# Patient Record
Sex: FEMALE | Race: WHITE | NOT HISPANIC OR LATINO | Employment: OTHER | URBAN - METROPOLITAN AREA
[De-identification: names, ages, dates, MRNs, and addresses within clinical notes are randomized per-mention and may not be internally consistent; named-entity substitution may affect disease eponyms.]

---

## 2017-05-15 DIAGNOSIS — Z78.0 MENOPAUSE: ICD-10-CM

## 2017-05-15 RX ORDER — PROGESTERONE 100 MG/1
CAPSULE ORAL
Qty: 30 CAPSULE | Refills: 0 | Status: SHIPPED | OUTPATIENT
Start: 2017-05-15 | End: 2017-07-20 | Stop reason: SDUPTHER

## 2017-05-17 ENCOUNTER — NURSE TRIAGE (OUTPATIENT)
Dept: ADMINISTRATIVE | Facility: CLINIC | Age: 57
End: 2017-05-17

## 2017-05-18 ENCOUNTER — TELEPHONE (OUTPATIENT)
Dept: OBSTETRICS AND GYNECOLOGY | Facility: CLINIC | Age: 57
End: 2017-05-18

## 2017-05-18 ENCOUNTER — OFFICE VISIT (OUTPATIENT)
Dept: OBSTETRICS AND GYNECOLOGY | Facility: CLINIC | Age: 57
End: 2017-05-18
Payer: COMMERCIAL

## 2017-05-18 VITALS
SYSTOLIC BLOOD PRESSURE: 150 MMHG | DIASTOLIC BLOOD PRESSURE: 80 MMHG | WEIGHT: 185.5 LBS | HEIGHT: 67 IN | BODY MASS INDEX: 29.11 KG/M2

## 2017-05-18 DIAGNOSIS — R30.0 DYSURIA: Primary | ICD-10-CM

## 2017-05-18 DIAGNOSIS — R03.0 ELEVATED BP WITHOUT DIAGNOSIS OF HYPERTENSION: ICD-10-CM

## 2017-05-18 DIAGNOSIS — I10 HTN (HYPERTENSION), BENIGN: ICD-10-CM

## 2017-05-18 DIAGNOSIS — N39.0 URINARY TRACT INFECTION WITHOUT HEMATURIA, SITE UNSPECIFIED: Primary | ICD-10-CM

## 2017-05-18 DIAGNOSIS — R82.90 ABNORMAL URINALYSIS: ICD-10-CM

## 2017-05-18 LAB
BILIRUB SERPL-MCNC: ABNORMAL MG/DL
BLOOD URINE, POC: 250
COLOR, POC UA: YELLOW
GLUCOSE UR QL STRIP: ABNORMAL
KETONES UR QL STRIP: ABNORMAL
LEUKOCYTE ESTERASE URINE, POC: ABNORMAL
NITRITE, POC UA: ABNORMAL
PH, POC UA: 5
PROTEIN, POC: ABNORMAL
SPECIFIC GRAVITY, POC UA: 1.02
UROBILINOGEN, POC UA: ABNORMAL

## 2017-05-18 PROCEDURE — 81002 URINALYSIS NONAUTO W/O SCOPE: CPT | Mod: S$GLB,,, | Performed by: NURSE PRACTITIONER

## 2017-05-18 PROCEDURE — 99214 OFFICE O/P EST MOD 30 MIN: CPT | Mod: 25,S$GLB,, | Performed by: NURSE PRACTITIONER

## 2017-05-18 PROCEDURE — 99999 PR PBB SHADOW E&M-EST. PATIENT-LVL III: CPT | Mod: PBBFAC,,, | Performed by: NURSE PRACTITIONER

## 2017-05-18 PROCEDURE — 3079F DIAST BP 80-89 MM HG: CPT | Mod: S$GLB,,, | Performed by: NURSE PRACTITIONER

## 2017-05-18 PROCEDURE — 1160F RVW MEDS BY RX/DR IN RCRD: CPT | Mod: S$GLB,,, | Performed by: NURSE PRACTITIONER

## 2017-05-18 PROCEDURE — 87086 URINE CULTURE/COLONY COUNT: CPT

## 2017-05-18 PROCEDURE — 3077F SYST BP >= 140 MM HG: CPT | Mod: S$GLB,,, | Performed by: NURSE PRACTITIONER

## 2017-05-18 RX ORDER — NITROFURANTOIN 25; 75 MG/1; MG/1
100 CAPSULE ORAL 2 TIMES DAILY
Qty: 14 CAPSULE | Refills: 0 | Status: SHIPPED | OUTPATIENT
Start: 2017-05-18 | End: 2017-05-25

## 2017-05-18 NOTE — TELEPHONE ENCOUNTER
Reason for Disposition   Urinating more frequently than usual (i.e., frequency)    Protocols used: ST URINARY SYMPTOMS-A-AH  pt has dysuria, hematuria, and urinary frequency. Pt was wanting an appt with GYN and possible prescription. Pt advised to call GYN in the morning. Offered to schedule appt with PCP but patient refused

## 2017-05-18 NOTE — TELEPHONE ENCOUNTER
Patient was seem by Dr Montesinos today - OB-GYN provider. Notified Mukesh hernandez to contact patient

## 2017-05-18 NOTE — PROGRESS NOTES
"Chief Complaint: Dysuria     HPI  Pt reports having burning with urination for the last 1-2  Days. The burning is more right after urination with bladder discomfort. Nothing makes it better/worse. She reports urinary frequency, urgency, w/o  flank pain at this time, + for hematuria yesterday. She does not believe it is from the vagina. She had PMB in fall of 2016 and had medication adjustments and hasn't had FU since that time. Reports the bleeding has resolved. She denies fever, chills, n/v/d. She denies vaginal lesions, soaps, detergents, or douching. No known h/o recurrent UTIs.     Elevated BP--she reports readings have been higher and has discussed with PCP. She reports having left sided discomfort on her arm when her BP is elevated, has been evaluated in ED and nothing acute at that time.    BP (!) 150/80  Ht 5' 7" (1.702 m)  Wt 84.1 kg (185 lb 8.3 oz)  LMP 07/26/2016  BMI 29.06 kg/m2    ROS   Systemic: No fever.  Gastrointestinal: No nausea and no suprapubic pain.  No diarrhea and no constipation.  Genitourinary: No urinary loss of control.  Positive for Dysuria.  No flank pain.  Skin: No rash.    Physical Exam   Vital Signs: ° Normal.  General Appearance: ° well developed.  ° Well nourished.  Neck: °Symmetrical °Trachea appearance mid-line  Eyes: °Extra-ocular movements normal   Respiratory: °No respiratory distress noted, °no use of accessory muscles  Back: ° No costovertebral angle tenderness.  Abdomen: Palpation: °  Positive direct suprapubic tenderness.  Genitalia:External: ° Vulva was normal.  ° Genitalia exhibited no lesion.  Vagina: ° Mucosa was normal.  ° No vaginal discharge was observed--Pelvic:Cervix: ° No cervical discharge.  ° Showed no lesion.  ° Not tender, no CMT  Uterus: ° Position was normal.   ° Not tender.  Psychiatric: Affect: ° Normal.  Neurological: ° No disorientation   Skin: °No skin rashes noted      Assessment/Plan   1. UTI--Macrobid as ordered. UA in office positive. Urine " Culture ordered at this visit. Prevention measures discussed. Drink plenty fluids. Discussed with pt what s/s to report back to the clinic.    2. Elevated Bp-continue home monitoring. Discussed with pt need for FU PCP apt to review symptoms and medication management. Precautions discussed and referred to Dr. Izaguirre    3. PMB--this is resolved after medication adjustments. However hasn't had fu. Recommend fu in 2 wks and also for repeat urine at that time since pt with Hematuria.

## 2017-05-18 NOTE — MR AVS SNAPSHOT
[facility]  [address], [address]  [facility] [address]  Phone: [phone]  Fax: [phone]                  Odilia Winslow   2017 1:40 PM   Office Visit    Description:  Female : 1960   Provider:  Ila Montesinos NP   Department:  [facility]           Reason for Visit     Urinary Frequency           Diagnoses this Visit        Comments    Dysuria    -  Primary     Abnormal urinalysis         Elevated BP without diagnosis of hypertension         HTN (hypertension), benign                To Do List           Future Appointments        Provider Department Dept Phone    2017 1:45 PM Odilia Perez MD [facility] [phone]      Goals (5 Years of Data)     None      Follow-Up and Disposition     Return in about 2 weeks (around 2017).    Follow-up and Disposition History      [facility]     [facility] On Call Nurse Care Line -  Assistance  Unless otherwise directed by your provider, please contact [facility], our nurse care line that is available for  assistance.     Registered nurses in the [facility] On Call Center provide: appointment scheduling, clinical advisement, health education, and other advisory services.  Call: [phone] (toll free)               Medications           Message regarding Medications     Verify the changes and/or additions to your medication regime listed below are the same as discussed with your clinician today.  If any of these changes or additions are incorrect, please notify your healthcare provider.             Verify that the below list of medications is an accurate representation of the medications you are currently taking.  If none reported, the list may be blank. If incorrect, please contact your healthcare provider. Carry this list with you in case of emergency.           Current Medications     amlodipine (NORVASC) 5 MG tablet Take 1 tablet (5 mg total) by mouth once daily.     "atorvastatin (LIPITOR) 20 MG tablet Take 1 tablet (20 mg total) by mouth once daily.    DEXILANT 60 mg capsule     escitalopram oxalate (LEXAPRO) 20 MG tablet Take 1 tablet (20 mg total) by mouth once daily.    progesterone (PROMETRIUM) 100 MG capsule TAKE ONE DAILY    triamcinolone acetonide 0.1% (KENALOG) 0.1 % cream APPLY TOPICALLY TWICE DAILY    valacyclovir (VALTREX) 1000 MG tablet TAKE (1/2) HALF BY MOUTH EVERY 12 HOURS    valsartan (DIOVAN) 160 MG tablet Take 1 tablet (160 mg total) by mouth every morning.           Clinical Reference Information           Your Vitals Were     BP Height Weight Last Period BMI    150/80 5' 7" (1.702 m) 84.1 kg (185 lb 8.3 oz) 07/26/2016 29.06 kg/m2      Blood Pressure          Most Recent Value    BP  (!)  150/80      Allergies as of 5/18/2017     No Known Allergies      Immunizations Administered on Date of Encounter - 5/18/2017     None      Orders Placed During Today's Visit      Normal Orders This Visit    POCT URINE DIPSTICK WITHOUT MICROSCOPE     Urine culture       Instructions    Care and prevention  These self-care steps can help prevent future infections:  · Drink plenty of fluids (at least 8-10 glasses of water) to prevent dehydration and flush out of the bladder. Do this unless you must restrict fluids for other health reasons, or your doctor told you not to.  · Drink cranberry juice every day. Because cranberry juice lowers the pH of the urinary tract, it discourages bacterial growth.   · Proper cleaning after going to the bathroom is important. Wipe from front to back after using the toilet to prevent the spread of bacteria.  · Urinate more often. Don't try to hold urine in for a long time.  · Wear loose-fitting clothes and cotton underwear. Avoid tight-fitting pans.  · Improve your diet and prevent constipation. Eat more fresh fruit and vegetables, and fiber, and less junk and fatty foods.  · Avoid sex until your symptoms are gone.  · Avoid caffeine, sodas, " alcohol, and spicy foods. These can irritate the bladder.  · Empty your bladder immediately before and after sexual intercourse to flush out the bladder.   · Take your entire prescription of antibiotics and follow the preventive measures, even if your symptoms stop.          Language Assistance Services     ATTENTION: Language assistance services are available, free of charge. Please call [phone].      ATENCIÓN: Si habla español, tiene a doyle disposición servicios gratuitos de asistencia lingüística. Llame al [phone].     KERWIN Ý: N?u b?n nói Ti?ng Vi?t, có các d?ch v? h? tr? ngôn ng? mi?n phí dành cho b?n. G?i s? [phone].         [facility] complies with applicable Federal civil rights laws and does not discriminate on the basis of race, color, national origin, age, disability, or sex.

## 2017-05-18 NOTE — TELEPHONE ENCOUNTER
Pt called, Ila was supposed to send an antibiotic to [facility] but it was never sent in. [facility] contacted Dr Colon by mistake requesting the medication. Please send script before pharmacy closes today.

## 2017-05-18 NOTE — TELEPHONE ENCOUNTER
----- Message from Mukesh Stevens sent at 5/18/2017  3:51 PM CDT -----  Contact: [facility]  x_ 1st Request   _ 2nd Request   _ 3rd Request     Who: JUDY RAMÍREZ [[id number]]    Why: Pharmacy called stated the patient was waiting for you to send in a prescription for antibiotic.  Patient is waiting.    What Number to Call Back: [phone]    When to Expect a call back: (Before the end of the day)   -- if call after 3:00 call back will be tomorrow.

## 2017-05-20 LAB — BACTERIA UR CULT: NO GROWTH

## 2017-05-23 ENCOUNTER — TELEPHONE (OUTPATIENT)
Dept: OBSTETRICS AND GYNECOLOGY | Facility: CLINIC | Age: 57
End: 2017-05-23

## 2017-05-23 NOTE — TELEPHONE ENCOUNTER
Spoke with pt and informed her that her urine culture came back negative. Pt states that the antibiotics are helping and she feels much better. Pt has not seen any blood in her urine and is not complaining  of discomfort. Pt is scheduled for a appointment with Dr. Perez on 6/13 and she will do her follow up U/A then. ELANA

## 2017-05-23 NOTE — TELEPHONE ENCOUNTER
Left message, urine culture is negative. How is patient feeling. Please let me know when patient calls so i can talk with her

## 2017-06-02 RX ORDER — VALSARTAN 160 MG/1
TABLET ORAL
Qty: 90 TABLET | Refills: 0 | Status: SHIPPED | OUTPATIENT
Start: 2017-06-02 | End: 2017-08-24 | Stop reason: SDUPTHER

## 2017-06-13 ENCOUNTER — OFFICE VISIT (OUTPATIENT)
Dept: OBSTETRICS AND GYNECOLOGY | Facility: CLINIC | Age: 57
End: 2017-06-13
Attending: OBSTETRICS & GYNECOLOGY
Payer: COMMERCIAL

## 2017-06-13 VITALS
BODY MASS INDEX: 29.02 KG/M2 | WEIGHT: 184.88 LBS | HEIGHT: 67 IN | SYSTOLIC BLOOD PRESSURE: 138 MMHG | DIASTOLIC BLOOD PRESSURE: 80 MMHG

## 2017-06-13 DIAGNOSIS — R93.89 THICKENED ENDOMETRIUM: ICD-10-CM

## 2017-06-13 DIAGNOSIS — R31.9 HEMATURIA: ICD-10-CM

## 2017-06-13 DIAGNOSIS — N95.0 POSTMENOPAUSAL BLEEDING: Primary | ICD-10-CM

## 2017-06-13 LAB
BILIRUB SERPL-MCNC: ABNORMAL MG/DL
BLOOD URINE, POC: ABNORMAL
COLOR, POC UA: YELLOW
GLUCOSE UR QL STRIP: NORMAL
KETONES UR QL STRIP: ABNORMAL
LEUKOCYTE ESTERASE URINE, POC: 1
NITRITE, POC UA: ABNORMAL
PH, POC UA: 5
PROTEIN, POC: ABNORMAL
SPECIFIC GRAVITY, POC UA: 1.01
UROBILINOGEN, POC UA: NORMAL

## 2017-06-13 PROCEDURE — 87186 SC STD MICRODIL/AGAR DIL: CPT

## 2017-06-13 PROCEDURE — 99999 PR PBB SHADOW E&M-EST. PATIENT-LVL III: CPT | Mod: PBBFAC,,, | Performed by: OBSTETRICS & GYNECOLOGY

## 2017-06-13 PROCEDURE — 87077 CULTURE AEROBIC IDENTIFY: CPT

## 2017-06-13 PROCEDURE — 87086 URINE CULTURE/COLONY COUNT: CPT

## 2017-06-13 PROCEDURE — 81002 URINALYSIS NONAUTO W/O SCOPE: CPT | Mod: S$GLB,,, | Performed by: OBSTETRICS & GYNECOLOGY

## 2017-06-13 PROCEDURE — 87088 URINE BACTERIA CULTURE: CPT

## 2017-06-13 PROCEDURE — 99213 OFFICE O/P EST LOW 20 MIN: CPT | Mod: 25,S$GLB,, | Performed by: OBSTETRICS & GYNECOLOGY

## 2017-06-13 RX ORDER — TRAZODONE HYDROCHLORIDE 50 MG/1
50 TABLET ORAL NIGHTLY
Qty: 30 TABLET | Refills: 2 | Status: ON HOLD | OUTPATIENT
Start: 2017-06-13 | End: 2017-06-20 | Stop reason: HOSPADM

## 2017-06-13 NOTE — PROGRESS NOTES
Subjective:       Patient ID: Odilia Winslow is a 57 y.o. female.    Chief Complaint:  Follow-up (2 wk follow up with pmb)      History of Present Illness  - patient presents to f/u postmenopausal bleeding. She reports that since stopping estrogen (and continuing progesterone) she has had no further bleeding. Is also here to give a urine sample for UTI diagnosed at last visit; denies dysuria. C/o trouble sleeping since stopping estrogen. Patient has tried otc remedies including melatonin and benadryl. Did not like how Ambien made her feel in the past. Patient had negative EMB and normal u/s in July 2016.    Past Medical History:   Diagnosis Date    Anxiety     GERD (gastroesophageal reflux disease)     Hyperlipidemia     Hypertension     Irritable bowel syndrome        Past Surgical History:   Procedure Laterality Date    DILATION AND CURETTAGE OF UTERUS  2007, 2013    ECTOPIC PREGNANCY SURGERY  1986    removal of portion of tube    KNEE SURGERY Right 2015    arthroscopy    TONSILLECTOMY           Current Outpatient Prescriptions:     amlodipine (NORVASC) 5 MG tablet, Take 1 tablet (5 mg total) by mouth once daily., Disp: 90 tablet, Rfl: 3    atorvastatin (LIPITOR) 20 MG tablet, Take 1 tablet (20 mg total) by mouth once daily., Disp: 90 tablet, Rfl: 3    DEXILANT 60 mg capsule, , Disp: , Rfl:     escitalopram oxalate (LEXAPRO) 20 MG tablet, Take 1 tablet (20 mg total) by mouth once daily., Disp: 90 tablet, Rfl: 3    progesterone (PROMETRIUM) 100 MG capsule, TAKE ONE DAILY, Disp: 30 capsule, Rfl: 0    trazodone (DESYREL) 50 MG tablet, Take 1 tablet (50 mg total) by mouth nightly., Disp: 30 tablet, Rfl: 2    valacyclovir (VALTREX) 1000 MG tablet, TAKE (1/2) HALF BY MOUTH EVERY 12 HOURS, Disp: 30 tablet, Rfl: 3    valsartan (DIOVAN) 160 MG tablet, TAKE ONE BY MOUTH EVERY MORNING, Disp: 90 tablet, Rfl: 0    Review of patient's allergies indicates:  No Known Allergies    GYN & OB History  Patient's  "last menstrual period was 2016.   Date of Last Pap: 2015    OB History    Para Term  AB Living   4 3 2  1 3   SAB TAB Ectopic Multiple Live Births     1  3      # Outcome Date GA Lbr Carlos/2nd Weight Sex Delivery Anes PTL Lv   4 Ectopic            3 Para      Vag-Spont  N RACHEL   2 Term      Vag-Spont  N RACHEL   1 Term      Vag-Spont  N RACHEL      Obstetric Comments   Menopause age 46       Social History     Social History    Marital status:      Spouse name: N/A    Number of children: N/A    Years of education: N/A     Occupational History    Not on file.     Social History Main Topics    Smoking status: Never Smoker    Smokeless tobacco: Never Used    Alcohol use Yes      Comment: occasional    Drug use: No    Sexual activity: Yes     Partners: Male     Birth control/ protection: Post-menopausal     Other Topics Concern    Not on file     Social History Narrative    No narrative on file       Family History   Problem Relation Age of Onset    Heart disease Mother     Hypertension Mother     Diabetes Father     Breast cancer Maternal Aunt     Colon cancer Neg Hx     Ovarian cancer Neg Hx        Review of Systems  Review of Systems   - see HPI    Objective:     Vitals:    17 1359   BP: 138/80   Weight: 83.8 kg (184 lb 13.7 oz)   Height: 5' 7" (1.702 m)       Physical Exam:   Constitutional: She appears well-developed and well-nourished. She is cooperative. No distress.       Cardiovascular: Normal rate, regular rhythm and normal heart sounds.     Pulmonary/Chest: Effort normal and breath sounds normal.        Abdominal: Soft. Normal appearance. There is no tenderness.     Genitourinary: Vagina normal and uterus normal. There is no rash, tenderness or lesion on the right labia. There is no rash, tenderness or lesion on the left labia. Cervix is normal. Right adnexum displays no mass, no tenderness and no fullness. Left adnexum displays no mass, no tenderness and no " fullness.               Neurological: She is alert.          Assessment/ Plan:     Orders Placed This Encounter    Urine culture    US Pelvis Comp with Transvag NON-OB (xpd    POCT URINE DIPSTICK WITHOUT MICROSCOPE    trazodone (DESYREL) 50 MG tablet       Odilia was seen today for follow-up.    Diagnoses and all orders for this visit:    Postmenopausal bleeding  -     US Pelvis Comp with Transvag NON-OB (xpd; Future  -     Urine culture    Hematuria  -     POCT URINE DIPSTICK WITHOUT MICROSCOPE  -     Urine culture    Thickened endometrium    Other orders  -     trazodone (DESYREL) 50 MG tablet; Take 1 tablet (50 mg total) by mouth nightly.    - urine shows trace blood; will send for culture.  - reviewed u/s: thickened EMS. D/w patient: since had EMB last year that was benign yet endometrium is now thickened, recommend hysteroscopy D&C for definitive diagnosis and likely treatment. Discussed risks/benefits. Patient agrees with scheduling procedure. Sent case request to office staff. Signed consents.    Return if symptoms worsen or fail to improve, for procedure when scheduled.

## 2017-06-15 ENCOUNTER — TELEPHONE (OUTPATIENT)
Dept: OBSTETRICS AND GYNECOLOGY | Facility: CLINIC | Age: 57
End: 2017-06-15

## 2017-06-16 ENCOUNTER — TELEPHONE (OUTPATIENT)
Dept: OBSTETRICS AND GYNECOLOGY | Facility: CLINIC | Age: 57
End: 2017-06-16

## 2017-06-16 DIAGNOSIS — R93.89 THICKENED ENDOMETRIUM: Primary | ICD-10-CM

## 2017-06-16 DIAGNOSIS — Z78.0 MENOPAUSE: ICD-10-CM

## 2017-06-16 LAB — BACTERIA UR CULT: NORMAL

## 2017-06-16 RX ORDER — NITROFURANTOIN 25; 75 MG/1; MG/1
100 CAPSULE ORAL 2 TIMES DAILY
Qty: 10 CAPSULE | Refills: 0 | Status: SHIPPED | OUTPATIENT
Start: 2017-06-16 | End: 2017-06-23

## 2017-06-16 NOTE — TELEPHONE ENCOUNTER
----- Message from Odilia Perez MD sent at 6/16/2017 11:34 AM CDT -----  Call patient. Has UTI. Needs to start treatment today since having procedure Tuesday. Send in Rx for macrobid 100 mg #10  i po bid no refills.

## 2017-06-17 DIAGNOSIS — R93.89 THICKENED ENDOMETRIUM: ICD-10-CM

## 2017-06-17 RX ORDER — IBUPROFEN 200 MG
800 TABLET ORAL EVERY 8 HOURS
Status: CANCELLED | OUTPATIENT
Start: 2017-06-18

## 2017-06-17 RX ORDER — ONDANSETRON 4 MG/1
8 TABLET, ORALLY DISINTEGRATING ORAL EVERY 8 HOURS PRN
Status: CANCELLED | OUTPATIENT
Start: 2017-06-17

## 2017-06-17 RX ORDER — KETOROLAC TROMETHAMINE 30 MG/ML
30 INJECTION, SOLUTION INTRAMUSCULAR; INTRAVENOUS EVERY 6 HOURS
Status: CANCELLED | OUTPATIENT
Start: 2017-06-17 | End: 2017-06-18

## 2017-06-17 NOTE — H&P
[facility] OB/[facility]N  Obstetrics & Gynecology  History & Physical    Patient Name: Odilia Winslow  MRN: [id number]  Admission Date: (Not on file)  Primary Care Provider: ALIRIO Clark MD    Subjective:     Chief Complaint/Reason for Admission: thickened endometrium    History of Present Illness: patient presented to the office to f/u postmenopausal bleeding. She reports that since stopping estrogen (and continuing progesterone) she has had no further bleeding. Is also being treated for a UTI with macrobid.  Patient had negative EMB and normal u/s in 2016. A decision for made for hysteroscopy D&C since endometrium is more thickened than it was in 2016; since had negative EMB in 2016, need definitive diagnosis with hysteroscopy D&C.     Current Outpatient Prescriptions on File Prior to Visit   Medication Sig    amlodipine (NORVASC) 5 MG tablet Take 1 tablet (5 mg total) by mouth once daily.    atorvastatin (LIPITOR) 20 MG tablet Take 1 tablet (20 mg total) by mouth once daily.    DEXILANT 60 mg capsule     escitalopram oxalate (LEXAPRO) 20 MG tablet Take 1 tablet (20 mg total) by mouth once daily.    nitrofurantoin, macrocrystal-monohydrate, (MACROBID) 100 MG capsule Take 1 capsule (100 mg total) by mouth 2 (two) times daily.    progesterone (PROMETRIUM) 100 MG capsule TAKE ONE DAILY    trazodone (DESYREL) 50 MG tablet Take 1 tablet (50 mg total) by mouth nightly.    valacyclovir (VALTREX) 1000 MG tablet TAKE (1/2) HALF BY MOUTH EVERY 12 HOURS    valsartan (DIOVAN) 160 MG tablet TAKE ONE BY MOUTH EVERY MORNING     No current facility-administered medications on file prior to visit.        Review of patient's allergies indicates:  No Known Allergies    Past Medical History:   Diagnosis Date    Anxiety     GERD (gastroesophageal reflux disease)     Hyperlipidemia     Hypertension     Irritable bowel syndrome      OB History    Para Term  AB Living   4 3 2  1 3   SAB TAB Ectopic  Multiple Live Births     1  3      # Outcome Date GA Lbr Carlos/2nd Weight Sex Delivery Anes PTL Lv   4 Ectopic            3 Para      Vag-Spont  N RACHEL   2 Term      Vag-Spont  N RACHEL   1 Term      Vag-Spont  N RACHEL      Obstetric Comments   Menopause age 46     Past Surgical History:   Procedure Laterality Date    DILATION AND CURETTAGE OF UTERUS  2007, 2013    ECTOPIC PREGNANCY SURGERY  1986    removal of portion of tube    KNEE SURGERY Right 2015    arthroscopy    TONSILLECTOMY       Family History     Problem Relation (Age of Onset)    Breast cancer Maternal Aunt    Diabetes Father    Heart disease Mother    Hypertension Mother        Social History Main Topics    Smoking status: Never Smoker    Smokeless tobacco: Never Used    Alcohol use Yes      Comment: occasional    Drug use: No    Sexual activity: Yes     Partners: Male     Birth control/ protection: Post-menopausal     Review of Systems    - General ROS: negative  Respiratory ROS: no cough, shortness of breath, or wheezing  Cardiovascular ROS: no chest pain or dyspnea on exertion  Gastrointestinal ROS: no abdominal pain, change in bowel habits, or black or bloody stools  Genito-Urinary ROS: no dysuria, trouble voiding, or hematuria  Neurological ROS: no TIA or stroke symptoms    Objective:      There is no height or weight on file to calculate BMI.  Patient's last menstrual period was 07/26/2016.    Physical Exam  Gen: AAO x 3, NAD  VS: see admit vitals  Heart: RRR  Lungs: CTA bilaterally  Abdomen: soft, NT/ND  Pelvic: deferred  Ext: no CCE    Laboratory:  None    Diagnostic Results:  US: Reviewed  Uterus:         Size: 7 x 3.7 x 4.2 cm         Appearance: Normal       Endometrial stripe: 10 mm    Right ovary: Not able to be visualized.    Left ovary: Not able to be visualized.    Assessment/Plan:     Thickened endometrium, UTI    - hysteroscopy D&C on 6/20/17  - risks/benefits discussed in detail, consents signed    Odilia Perez  MD  Obstetrics & Gynecology  [facility]

## 2017-06-18 RX ORDER — PROGESTERONE 100 MG/1
CAPSULE ORAL
Refills: 0 | OUTPATIENT
Start: 2017-06-18

## 2017-06-19 ENCOUNTER — HOSPITAL ENCOUNTER (OUTPATIENT)
Dept: PREADMISSION TESTING | Facility: OTHER | Age: 57
Discharge: HOME | End: 2017-06-19
Attending: OBSTETRICS & GYNECOLOGY
Payer: COMMERCIAL

## 2017-06-19 ENCOUNTER — ANESTHESIA EVENT (OUTPATIENT)
Dept: SURGERY | Facility: OTHER | Age: 57
End: 2017-06-19
Payer: COMMERCIAL

## 2017-06-19 VITALS
SYSTOLIC BLOOD PRESSURE: 141 MMHG | HEIGHT: 67 IN | DIASTOLIC BLOOD PRESSURE: 74 MMHG | WEIGHT: 180 LBS | TEMPERATURE: 99 F | BODY MASS INDEX: 28.25 KG/M2 | OXYGEN SATURATION: 98 % | HEART RATE: 84 BPM

## 2017-06-19 LAB
BASOPHILS # BLD AUTO: 0.03 K/UL
BASOPHILS NFR BLD: 0.5 %
DIFFERENTIAL METHOD: ABNORMAL
EOSINOPHIL # BLD AUTO: 0.2 K/UL
EOSINOPHIL NFR BLD: 3.7 %
ERYTHROCYTE [DISTWIDTH] IN BLOOD BY AUTOMATED COUNT: 13.2 %
HCT VFR BLD AUTO: 42.7 %
HGB BLD-MCNC: 14.1 G/DL
LYMPHOCYTES # BLD AUTO: 1.6 K/UL
LYMPHOCYTES NFR BLD: 24.5 %
MCH RBC QN AUTO: 31.1 PG
MCHC RBC AUTO-ENTMCNC: 33 %
MCV RBC AUTO: 94 FL
MONOCYTES # BLD AUTO: 0.6 K/UL
MONOCYTES NFR BLD: 8.9 %
NEUTROPHILS # BLD AUTO: 4.1 K/UL
NEUTROPHILS NFR BLD: 62.1 %
PLATELET # BLD AUTO: 288 K/UL
PMV BLD AUTO: 9.5 FL
RBC # BLD AUTO: 4.54 M/UL
WBC # BLD AUTO: 6.53 K/UL

## 2017-06-19 PROCEDURE — 36415 COLL VENOUS BLD VENIPUNCTURE: CPT

## 2017-06-19 PROCEDURE — 85025 COMPLETE CBC W/AUTO DIFF WBC: CPT

## 2017-06-19 RX ORDER — MIDAZOLAM HYDROCHLORIDE 5 MG/ML
5 INJECTION INTRAMUSCULAR; INTRAVENOUS
Status: DISCONTINUED | OUTPATIENT
Start: 2017-06-20 | End: 2017-06-20 | Stop reason: HOSPADM

## 2017-06-19 RX ORDER — SODIUM CHLORIDE, SODIUM LACTATE, POTASSIUM CHLORIDE, CALCIUM CHLORIDE 600; 310; 30; 20 MG/100ML; MG/100ML; MG/100ML; MG/100ML
INJECTION, SOLUTION INTRAVENOUS CONTINUOUS
Status: CANCELLED | OUTPATIENT
Start: 2017-06-19

## 2017-06-19 NOTE — ANESTHESIA PREPROCEDURE EVALUATION
06/19/2017  Odilia Winslow is a 57 y.o., female.    Anesthesia Evaluation    I have reviewed the Patient Summary Reports.    I have reviewed the Nursing Notes.   I have reviewed the Medications.     Review of Systems  Anesthesia Hx:  No problems with previous Anesthesia  Denies Family Hx of Anesthesia complications.   Denies Personal Hx of Anesthesia complications.   Social:  Non-Smoker    Hematology/Oncology:  Hematology Normal   Oncology Normal     Cardiovascular:   Hypertension, well controlled    Pulmonary:  Pulmonary Normal    Renal/:  Renal/ Normal     Hepatic/GI:   GERD, well controlled    Musculoskeletal:  Musculoskeletal Normal    Neurological:  Neurology Normal    Endocrine:  Endocrine Normal        Physical Exam  General:  Well nourished    Airway/Jaw/Neck:  Airway Findings: Mouth Opening: Normal Tongue: Normal  General Airway Assessment: Adult  Mallampati: I  TM Distance: Normal, at least 6 cm         Dental:  Dental Findings: In tact, molar caps             Anesthesia Plan  Type of Anesthesia, risks & benefits discussed:  Anesthesia Type:  general  Patient's Preference:   Intra-op Monitoring Plan: standard ASA monitors  Intra-op Monitoring Plan Comments:   Post Op Pain Control Plan:   Post Op Pain Control Plan Comments:   Induction:   IV  Beta Blocker:         Informed Consent: Patient understands risks and agrees with Anesthesia plan.  Questions answered. Anesthesia consent signed with patient.  ASA Score: 2     Day of Surgery Review of History & Physical:    H&P update referred to the surgeon.         Ready For Surgery From Anesthesia Perspective.

## 2017-06-19 NOTE — DISCHARGE INSTRUCTIONS
PRE-ADMIT TESTING -  [phone]    [address]  [facility]        OUTPATIENT SURGERY UNIT - [phone]    Your surgery has been scheduled at [facility]. We are pleased to have the opportunity to serve you. For Further Information please call [phone].    On the day of surgery please report to the Information Desk on the 1st floor.    · CONTACT YOUR PHYSICIAN'S OFFICE THE DAY PRIOR TO YOUR SURGERY TO OBTAIN YOUR ARRIVAL TIME.     · The evening before surgery do not eat anything after 9 p.m. ( this includes hard candy, chewing gum and mints).  You may only have GATORADE, POWERADE AND WATER  from 9 p.m. until you leave your home.   DO NOT DRINK ANY LIQUIDS ON THE WAY TO THE HOSPITAL.      SPECIAL MEDICATION INSTRUCTIONS: TAKE medications checked off by the Anesthesiologist on your Medication List.    Angiogram Patients: Take medications as instructed by your physician, including aspirin.     Surgery Patients:    If you take ASPIRIN - Your PHYSICIAN/SURGEON will need to inform you IF/OR when you need to stop taking aspirin prior to your surgery.     Do Not take any medications containing IBUPROFEN.  Do Not Wear any make-up or dark nail polish   (especially eye make-up) to surgery. If you come to surgery with makeup on you will be required to remove the makeup or nail polish.    Do not shave your surgical area at least 5 days prior to your surgery. The surgical prep will be performed at the hospital according to Infection Control regulations.    Leave all valuables at home.   Do Not wear any jewelry or watches, including any metal in body piercings.  Contact Lens must be removed before surgery. Either do not wear the contact lens or bring a case and solution for storage.  Please bring a container for eyeglasses or dentures as required.  Bring any paperwork your physician has provided, such as consent forms,  history and physicals, doctor's orders, etc.   Bring comfortable clothes  that are loose fitting to wear upon discharge. Take into consideration the type of surgery being performed.  Maintain your diet as advised per your physician the day prior to surgery.      Adequate rest the night before surgery is advised.   Park in the Parking lot behind the hospital or in the [facility] across the street from the parking lot. Parking is complimentary.  If you will be discharged the same day as your procedure, please arrange for a responsible adult to drive you home or to accompany you if traveling by taxi.   YOU WILL NOT BE PERMITTED TO DRIVE OR TO LEAVE THE HOSPITAL ALONE AFTER SURGERY.   It is strongly recommended that you arrange for someone to remain with you for the first 24 hrs following your surgery.       Thank you for your cooperation.  The Staff of [facility].        Bathing Instructions                                                                 Please shower the evening before and morning of your procedure with    ANTIBACTERIAL SOAP. ( DIAL, etc )  Concentrate on the surgical area   for at least 3 minutes and rinse completely. Dry off as usual.   Do not use any deodorant, powder, body lotions, perfume, after shave or    cologne.

## 2017-06-20 ENCOUNTER — ANESTHESIA (OUTPATIENT)
Dept: SURGERY | Facility: OTHER | Age: 57
End: 2017-06-20
Payer: COMMERCIAL

## 2017-06-20 ENCOUNTER — HOSPITAL ENCOUNTER (OUTPATIENT)
Facility: OTHER | Age: 57
Discharge: HOME | End: 2017-06-20
Attending: OBSTETRICS & GYNECOLOGY | Admitting: OBSTETRICS & GYNECOLOGY
Payer: COMMERCIAL

## 2017-06-20 ENCOUNTER — SURGERY (OUTPATIENT)
Age: 57
End: 2017-06-20

## 2017-06-20 VITALS
DIASTOLIC BLOOD PRESSURE: 76 MMHG | SYSTOLIC BLOOD PRESSURE: 147 MMHG | OXYGEN SATURATION: 98 % | TEMPERATURE: 98 F | WEIGHT: 180 LBS | HEART RATE: 74 BPM | HEIGHT: 67 IN | BODY MASS INDEX: 28.25 KG/M2 | RESPIRATION RATE: 16 BRPM

## 2017-06-20 DIAGNOSIS — R93.89 THICKENED ENDOMETRIUM: ICD-10-CM

## 2017-06-20 PROCEDURE — 71000039 HC RECOVERY, EACH ADD'L HOUR: Performed by: OBSTETRICS & GYNECOLOGY

## 2017-06-20 PROCEDURE — 36000706: Performed by: OBSTETRICS & GYNECOLOGY

## 2017-06-20 PROCEDURE — 37000008 HC ANESTHESIA 1ST 15 MINUTES: Performed by: OBSTETRICS & GYNECOLOGY

## 2017-06-20 PROCEDURE — 25000003 PHARM REV CODE 250: Performed by: NURSE ANESTHETIST, CERTIFIED REGISTERED

## 2017-06-20 PROCEDURE — 63600175 PHARM REV CODE 636 W HCPCS: Performed by: SPECIALIST

## 2017-06-20 PROCEDURE — 71000015 HC POSTOP RECOV 1ST HR: Performed by: OBSTETRICS & GYNECOLOGY

## 2017-06-20 PROCEDURE — 25000003 PHARM REV CODE 250: Performed by: ANESTHESIOLOGY

## 2017-06-20 PROCEDURE — 88305 TISSUE EXAM BY PATHOLOGIST: CPT | Mod: 26,,, | Performed by: PATHOLOGY

## 2017-06-20 PROCEDURE — 71000033 HC RECOVERY, INTIAL HOUR: Performed by: OBSTETRICS & GYNECOLOGY

## 2017-06-20 PROCEDURE — 37000009 HC ANESTHESIA EA ADD 15 MINS: Performed by: OBSTETRICS & GYNECOLOGY

## 2017-06-20 PROCEDURE — 36000707: Performed by: OBSTETRICS & GYNECOLOGY

## 2017-06-20 PROCEDURE — 25000003 PHARM REV CODE 250: Performed by: SPECIALIST

## 2017-06-20 PROCEDURE — 63600175 PHARM REV CODE 636 W HCPCS: Performed by: NURSE ANESTHETIST, CERTIFIED REGISTERED

## 2017-06-20 PROCEDURE — 88305 TISSUE EXAM BY PATHOLOGIST: CPT | Performed by: PATHOLOGY

## 2017-06-20 RX ORDER — SODIUM CHLORIDE, SODIUM LACTATE, POTASSIUM CHLORIDE, CALCIUM CHLORIDE 600; 310; 30; 20 MG/100ML; MG/100ML; MG/100ML; MG/100ML
INJECTION, SOLUTION INTRAVENOUS CONTINUOUS
Status: DISCONTINUED | OUTPATIENT
Start: 2017-06-20 | End: 2017-06-20 | Stop reason: HOSPADM

## 2017-06-20 RX ORDER — DIPHENHYDRAMINE HYDROCHLORIDE 50 MG/ML
25 INJECTION INTRAMUSCULAR; INTRAVENOUS EVERY 6 HOURS PRN
Status: DISCONTINUED | OUTPATIENT
Start: 2017-06-20 | End: 2017-06-20 | Stop reason: HOSPADM

## 2017-06-20 RX ORDER — ONDANSETRON 2 MG/ML
4 INJECTION INTRAMUSCULAR; INTRAVENOUS ONCE AS NEEDED
Status: DISCONTINUED | OUTPATIENT
Start: 2017-06-20 | End: 2017-06-20 | Stop reason: HOSPADM

## 2017-06-20 RX ORDER — FENTANYL CITRATE 50 UG/ML
INJECTION, SOLUTION INTRAMUSCULAR; INTRAVENOUS
Status: DISCONTINUED | OUTPATIENT
Start: 2017-06-20 | End: 2017-06-20

## 2017-06-20 RX ORDER — SODIUM CHLORIDE 0.9 % (FLUSH) 0.9 %
3 SYRINGE (ML) INJECTION EVERY 8 HOURS
Status: DISCONTINUED | OUTPATIENT
Start: 2017-06-20 | End: 2017-06-20 | Stop reason: HOSPADM

## 2017-06-20 RX ORDER — ACETAMINOPHEN 10 MG/ML
INJECTION, SOLUTION INTRAVENOUS
Status: DISCONTINUED | OUTPATIENT
Start: 2017-06-20 | End: 2017-06-20

## 2017-06-20 RX ORDER — MEPERIDINE HYDROCHLORIDE 50 MG/ML
12.5 INJECTION INTRAMUSCULAR; INTRAVENOUS; SUBCUTANEOUS ONCE AS NEEDED
Status: DISCONTINUED | OUTPATIENT
Start: 2017-06-20 | End: 2017-06-20 | Stop reason: HOSPADM

## 2017-06-20 RX ORDER — HYDROMORPHONE HYDROCHLORIDE 2 MG/ML
0.4 INJECTION, SOLUTION INTRAMUSCULAR; INTRAVENOUS; SUBCUTANEOUS EVERY 5 MIN PRN
Status: DISCONTINUED | OUTPATIENT
Start: 2017-06-20 | End: 2017-06-20 | Stop reason: HOSPADM

## 2017-06-20 RX ORDER — ONDANSETRON HYDROCHLORIDE 2 MG/ML
INJECTION, SOLUTION INTRAMUSCULAR; INTRAVENOUS
Status: DISCONTINUED | OUTPATIENT
Start: 2017-06-20 | End: 2017-06-20

## 2017-06-20 RX ORDER — PROPOFOL 10 MG/ML
VIAL (ML) INTRAVENOUS
Status: DISCONTINUED | OUTPATIENT
Start: 2017-06-20 | End: 2017-06-20

## 2017-06-20 RX ORDER — SODIUM CHLORIDE 0.9 % (FLUSH) 0.9 %
3 SYRINGE (ML) INJECTION
Status: DISCONTINUED | OUTPATIENT
Start: 2017-06-20 | End: 2017-06-20 | Stop reason: HOSPADM

## 2017-06-20 RX ORDER — LIDOCAINE HCL/PF 100 MG/5ML
SYRINGE (ML) INTRAVENOUS
Status: DISCONTINUED | OUTPATIENT
Start: 2017-06-20 | End: 2017-06-20

## 2017-06-20 RX ORDER — DEXAMETHASONE SODIUM PHOSPHATE 4 MG/ML
INJECTION, SOLUTION INTRA-ARTICULAR; INTRALESIONAL; INTRAMUSCULAR; INTRAVENOUS; SOFT TISSUE
Status: DISCONTINUED | OUTPATIENT
Start: 2017-06-20 | End: 2017-06-20

## 2017-06-20 RX ORDER — OXYCODONE HYDROCHLORIDE 5 MG/1
5 TABLET ORAL
Status: DISCONTINUED | OUTPATIENT
Start: 2017-06-20 | End: 2017-06-20 | Stop reason: HOSPADM

## 2017-06-20 RX ORDER — GLYCOPYRROLATE 0.2 MG/ML
INJECTION INTRAMUSCULAR; INTRAVENOUS
Status: DISCONTINUED | OUTPATIENT
Start: 2017-06-20 | End: 2017-06-20

## 2017-06-20 RX ORDER — ZOLPIDEM TARTRATE 5 MG/1
5 TABLET ORAL NIGHTLY PRN
Qty: 30 TABLET | Refills: 0 | Status: SHIPPED | OUTPATIENT
Start: 2017-06-20 | End: 2017-10-30

## 2017-06-20 RX ORDER — FENTANYL CITRATE 50 UG/ML
25 INJECTION, SOLUTION INTRAMUSCULAR; INTRAVENOUS EVERY 5 MIN PRN
Status: DISCONTINUED | OUTPATIENT
Start: 2017-06-20 | End: 2017-06-20 | Stop reason: HOSPADM

## 2017-06-20 RX ORDER — OXYCODONE AND ACETAMINOPHEN 5; 325 MG/1; MG/1
1 TABLET ORAL EVERY 4 HOURS PRN
Qty: 10 TABLET | Refills: 0 | Status: SHIPPED | OUTPATIENT
Start: 2017-06-20 | End: 2017-07-14 | Stop reason: ALTCHOICE

## 2017-06-20 RX ORDER — MIDAZOLAM HYDROCHLORIDE 1 MG/ML
INJECTION INTRAMUSCULAR; INTRAVENOUS
Status: DISCONTINUED | OUTPATIENT
Start: 2017-06-20 | End: 2017-06-20

## 2017-06-20 RX ADMIN — CARBOXYMETHYLCELLULOSE SODIUM 2 DROP: 2.5 SOLUTION/ DROPS OPHTHALMIC at 02:06

## 2017-06-20 RX ADMIN — FENTANYL CITRATE 100 MCG: 50 INJECTION, SOLUTION INTRAMUSCULAR; INTRAVENOUS at 02:06

## 2017-06-20 RX ADMIN — PROPOFOL 200 MG: 10 INJECTION, EMULSION INTRAVENOUS at 02:06

## 2017-06-20 RX ADMIN — PROPOFOL 50 MG: 10 INJECTION, EMULSION INTRAVENOUS at 02:06

## 2017-06-20 RX ADMIN — GLYCOPYRROLATE 0.2 MG: 0.2 INJECTION, SOLUTION INTRAMUSCULAR; INTRAVENOUS at 02:06

## 2017-06-20 RX ADMIN — SODIUM CHLORIDE, SODIUM LACTATE, POTASSIUM CHLORIDE, AND CALCIUM CHLORIDE: 600; 310; 30; 20 INJECTION, SOLUTION INTRAVENOUS at 12:06

## 2017-06-20 RX ADMIN — Medication 30 MG: at 02:06

## 2017-06-20 RX ADMIN — LIDOCAINE HYDROCHLORIDE 75 MG: 20 INJECTION, SOLUTION INTRAVENOUS at 02:06

## 2017-06-20 RX ADMIN — OXYCODONE HYDROCHLORIDE 5 MG: 5 TABLET ORAL at 03:06

## 2017-06-20 RX ADMIN — MIDAZOLAM HYDROCHLORIDE 2 MG: 1 INJECTION, SOLUTION INTRAMUSCULAR; INTRAVENOUS at 12:06

## 2017-06-20 RX ADMIN — ACETAMINOPHEN 1000 MG: 10 INJECTION, SOLUTION INTRAVENOUS at 02:06

## 2017-06-20 RX ADMIN — ONDANSETRON 4 MG: 2 INJECTION, SOLUTION INTRAMUSCULAR; INTRAVENOUS at 02:06

## 2017-06-20 RX ADMIN — DEXAMETHASONE SODIUM PHOSPHATE 8 MG: 4 INJECTION, SOLUTION INTRAMUSCULAR; INTRAVENOUS at 02:06

## 2017-06-20 NOTE — PLAN OF CARE
Odilia Winslow has met all discharge criteria from Phase II. Vital Signs are stable, ambulating  without difficulty. Discharge instructions given, patient verbalized understanding. Discharged from facility via wheelchair in stable condition.

## 2017-06-20 NOTE — H&P (VIEW-ONLY)
[facility]  Obstetrics & Gynecology  History & Physical    Patient Name: Odilia Winslow  MRN: [id number]  Admission Date: (Not on file)  Primary Care Provider: ALIRIO Clark MD    Subjective:     Chief Complaint/Reason for Admission: thickened endometrium    History of Present Illness: patient presented to the office to f/u postmenopausal bleeding. She reports that since stopping estrogen (and continuing progesterone) she has had no further bleeding. Is also being treated for a UTI with macrobid.  Patient had negative EMB and normal u/s in 2016. A decision for made for hysteroscopy D&C since endometrium is more thickened than it was in 2016; since had negative EMB in 2016, need definitive diagnosis with hysteroscopy D&C.     Current Outpatient Prescriptions on File Prior to Visit   Medication Sig    amlodipine (NORVASC) 5 MG tablet Take 1 tablet (5 mg total) by mouth once daily.    atorvastatin (LIPITOR) 20 MG tablet Take 1 tablet (20 mg total) by mouth once daily.    DEXILANT 60 mg capsule     escitalopram oxalate (LEXAPRO) 20 MG tablet Take 1 tablet (20 mg total) by mouth once daily.    nitrofurantoin, macrocrystal-monohydrate, (MACROBID) 100 MG capsule Take 1 capsule (100 mg total) by mouth 2 (two) times daily.    progesterone (PROMETRIUM) 100 MG capsule TAKE ONE DAILY    trazodone (DESYREL) 50 MG tablet Take 1 tablet (50 mg total) by mouth nightly.    valacyclovir (VALTREX) 1000 MG tablet TAKE (1/2) HALF BY MOUTH EVERY 12 HOURS    valsartan (DIOVAN) 160 MG tablet TAKE ONE BY MOUTH EVERY MORNING     No current facility-administered medications on file prior to visit.        Review of patient's allergies indicates:  No Known Allergies    Past Medical History:   Diagnosis Date    Anxiety     GERD (gastroesophageal reflux disease)     Hyperlipidemia     Hypertension     Irritable bowel syndrome      OB History    Para Term  AB Living   4 3 2  1 3   SAB TAB Ectopic  Multiple Live Births     1  3      # Outcome Date GA Lbr Carlos/2nd Weight Sex Delivery Anes PTL Lv   4 Ectopic            3 Para      Vag-Spont  N RACHEL   2 Term      Vag-Spont  N RACHEL   1 Term      Vag-Spont  N RACHEL      Obstetric Comments   Menopause age 46     Past Surgical History:   Procedure Laterality Date    DILATION AND CURETTAGE OF UTERUS  2007, 2013    ECTOPIC PREGNANCY SURGERY  1986    removal of portion of tube    KNEE SURGERY Right 2015    arthroscopy    TONSILLECTOMY       Family History     Problem Relation (Age of Onset)    Breast cancer Maternal Aunt    Diabetes Father    Heart disease Mother    Hypertension Mother        Social History Main Topics    Smoking status: Never Smoker    Smokeless tobacco: Never Used    Alcohol use Yes      Comment: occasional    Drug use: No    Sexual activity: Yes     Partners: Male     Birth control/ protection: Post-menopausal     Review of Systems    - General ROS: negative  Respiratory ROS: no cough, shortness of breath, or wheezing  Cardiovascular ROS: no chest pain or dyspnea on exertion  Gastrointestinal ROS: no abdominal pain, change in bowel habits, or black or bloody stools  Genito-Urinary ROS: no dysuria, trouble voiding, or hematuria  Neurological ROS: no TIA or stroke symptoms    Objective:      There is no height or weight on file to calculate BMI.  Patient's last menstrual period was 07/26/2016.    Physical Exam  Gen: AAO x 3, NAD  VS: see admit vitals  Heart: RRR  Lungs: CTA bilaterally  Abdomen: soft, NT/ND  Pelvic: deferred  Ext: no CCE    Laboratory:  None    Diagnostic Results:  US: Reviewed  Uterus:         Size: 7 x 3.7 x 4.2 cm         Appearance: Normal       Endometrial stripe: 10 mm    Right ovary: Not able to be visualized.    Left ovary: Not able to be visualized.    Assessment/Plan:     Thickened endometrium, UTI    - hysteroscopy D&C on 6/20/17  - risks/benefits discussed in detail, consents signed    Odilia Perez  MD  Obstetrics & Gynecology  PROV [facility]

## 2017-06-20 NOTE — DISCHARGE INSTRUCTIONS
Discharge Instructions: After Your Surgery  Youve just had surgery. During surgery you were given medicine called anesthesia to keep you relaxed and free of pain. After surgery you may have some pain or nausea. This is common. Here are some tips for feeling better and getting well after surgery.  Going home  Your doctor or nurse will show you how to take care of yourself when you go home. He or she will also answer your questions. Have an adult family member or friend drive you home. For the first 24 hours after your surgery:  · Do not drive or use heavy equipment.  · Do not make important decisions or sign legal papers.  · Do not drink alcohol.  · Have someone stay with you, if needed. He or she can watch for problems and help keep you safe.  Be sure to go to all follow-up visits with your doctor. And rest after your surgery for as long as your doctor tells you to.  Coping with pain  If you have pain after surgery, pain medicine will help you feel better. Take it as told, before pain becomes severe. Also, ask your doctor or pharmacist about other ways to control pain. This might be with heat, ice, or relaxation. And follow any other instructions your surgeon or nurse gives you.  Tips for taking pain medicine  To get the best relief possible, remember these points:  · Pain medicines can upset your stomach. Taking them with a little food may help.  · Most pain relievers taken by mouth need at least 20 to 30 minutes to start to work.  · Taking medicine on a schedule can help you remember to take it. Try to time your medicine so that you can take it before starting an activity. This might be before you get dressed, go for a walk, or sit down for dinner.  · Constipation is a common side effect of pain medicines. Call your doctor before taking any medicines such as laxatives or stool softeners to help ease constipation. Also ask if you should skip any foods. Drinking lots of fluids and eating foods such as fruits and  vegetables that are high in fiber can also help. Remember, do not take laxatives unless your surgeon has prescribed them.  · Drinking alcohol and taking pain medicine can cause dizziness and slow your breathing. It can even be deadly. Do not drink alcohol while taking pain medicine.  · Pain medicine can make you react more slowly to things. Do not drive or run machinery while taking pain medicine.  Your health care provider may tell you to take acetaminophen to help ease your pain. Ask him or her how much you are supposed to take each day. Acetaminophen or other pain relievers may interact with your prescription medicines or other over-the-counter (OTC) drugs. Some prescription medicines have acetaminophen and other ingredients. Using both prescription and OTC acetaminophen for pain can cause you to overdose. Read the labels on your OTC medicines with care. This will help you to clearly know the list of ingredients, how much to take, and any warnings. It may also help you not take too much acetaminophen. If you have questions or do not understand the information, ask your pharmacist or health care provider to explain it to you before you take the OTC medicine.  Managing nausea  Some people have an upset stomach after surgery. This is often because of anesthesia, pain, or pain medicine, or the stress of surgery. These tips will help you handle nausea and eat healthy foods as you get better. If you were on a special food plan before surgery, ask your doctor if you should follow it while you get better. These tips may help:  · Do not push yourself to eat. Your body will tell you when to eat and how much.  · Start off with clear liquids and soup. They are easier to digest.  · Next try semi-solid foods, such as mashed potatoes, applesauce, and gelatin, as you feel ready.  · Slowly move to solid foods. Dont eat fatty, rich, or spicy foods at first.  · Do not force yourself to have 3 large meals a day. Instead eat smaller  amounts more often.  · Take pain medicines with a small amount of solid food, such as crackers or toast, to avoid nausea.     Call your surgeon if  · You still have pain an hour after taking medicine. The medicine may not be strong enough.  · You feel too sleepy, dizzy, or groggy. The medicine may be too strong.  · You have side effects like nausea, vomiting, or skin changes, such as rash, itching, or hives.       If you have obstructive sleep apnea  You were given anesthesia medicine during surgery to keep you comfortable and free of pain. After surgery, you may have more apnea spells because of this medicine and other medicines you were given. The spells may last longer than usual.   At home:  · Keep using the continuous positive airway pressure (CPAP) device when you sleep. Unless your health care provider tells you not to, use it when you sleep, day or night. CPAP is a common device used to treat obstructive sleep apnea.  · Talk with your provider before taking any pain medicine, muscle relaxants, or sedatives. Your provider will tell you about the possible dangers of taking these medicines.  Date Last Reviewed: 10/16/2014  © 4572-3683 [employer]. [address] [place] [address]. All rights reserved. This information is not intended as a substitute for professional medical care. Always follow your healthcare professional's instructions.      Hysteroscopy    Hysteroscopy is a procedure that is done to see inside your uterus. It can help find the cause of problems in the uterus. This helps your health care provider decide on the best treatment. In some cases, it can be used to perform treatment. Hysteroscopy may be done in your health care provider's office or in the hospital.  Why might I need hysteroscopy?  Hysteroscopy may be done based on the results of other tests. It can help find the cause of problems. These can include:  · Unusually heavy or long menstrual periods  · Bleeding  between periods  · Postmenopausal bleeding  · Trouble becoming pregnant (infertility) or carrying a pregnancy to term  · To locate an intrauterine device (IUD)  · To perform sterilization  What are the risks and complications of hysteroscopy?  Problems with the procedure are rare. But all procedures have risks. Risks of hysteroscopy include:  · Infection  · Bleeding  · Tearing of the uterine wall  · Damage to internal organs  · Scarring of the uterus  · Fluid overload  · Problems with anesthesia (the medication that prevents pain during the procedure)  How do I get ready for hysteroscopy?  · Tell your health care provider if you have any health problems. These include diabetes, heart disease, or bleeding problems.  · Tell your health care provider about all the medicines you take. This includes any over-the-counter medications, herbs, or supplements.  · You may be told not to use vaginal creams or medication. And you may be told not to have sex or douche.  · You may be told not to eat or drink the night before the procedure.  · You may be tested for pregnancy and infection.  · You may be asked to sign a consent form.  · You may be given a pain reliever to take an hour before the procedure. This helps relieve cramping that may occur.  What happens during a hysteroscopy?  · Youll lie on an exam table with your feet in stirrups.  · You may be given general anesthesia or medicines to help you relax or sleep. In some cases, an IV line will be put into a vein in your arm or hand. This line is then used to give fluids and medicines.  · A tool called a speculum is inserted into the vagina to hold it open. A tool called a dilator may be used to widen the cervix.  · Numbing medicine may be applied to the cervix.  · The hysteroscope (a long, thin lighted tube) is inserted through the vagina and into the uterus. It is used to see inside the uterus. Images of the uterus are viewed on a monitor.  · A gas or fluid may be injected  into the uterus to expand it.  · Other tools may be put through the hysteroscope. These are used to take tissue samples, remove growths, or place implants for the purpose of sterilization.  What happens after hysteroscopy?  · You may have cramps and bleeding for 24 hours after the procedure. This is normal. Use pads instead of tampons.  · Do not douche or use tampons until your health care provider says its OK.  · Do not use any vaginal medicines until you are told its OK.  · Ask your health care provider when its OK to have sex again.  When should I call my health care provider?  Call your health care provider if you have:  · Heavy bleeding (more than 1 pad an hour for 2 or more hours)  · A fever over 100.4°F (38.0°C)  · Increasing abdominal pain or tenderness  · Foul-smelling discharge   Follow-up care  Schedule a follow-up visit with your health care provider. Based on the results of your test, you may need more treatment. Be sure to follow instructions and keep your appointments.  Date Last Reviewed: 5/12/2015 © 2000-2016 [employer]. [address] [place] [address]. All rights reserved. This information is not intended as a substitute for professional medical care. Always follow your healthcare professional's instructions.

## 2017-06-20 NOTE — OP NOTE
[facility]  General Surgery  Operative Note    SUMMARY     Date of Procedure: 6/20/2017     Procedure: Procedure(s) (LRB):  [id number] (N/A)       Surgeon(s) and Role:     * Odilia Perez MD - Primary    Assisting Surgeon: None    Pre-Operative Diagnosis: Thickened endometrium [R93.8]    Post-Operative Diagnosis: Post-Op Diagnosis Codes:     * Thickened endometrium [R93.8]    Anesthesia: General    Technical Procedures Used: hysteroscope    Description of the Findings of the Procedure: atrophic endometrial cavity    Significant Surgical Tasks Conducted by the Assistant(s), if Applicable: n/a    Complications: No    Estimated Blood Loss (EBL): 10 ml           Implants: * No implants in log *    Specimens:   Specimen (12h ago through future)    Start     Ordered    06/20/17 1427  Specimen to Pathology - Surgery  Once     Comments:  1. Endometrial scrappings      06/20/17 1430                  Condition: Good    Disposition: PACU - hemodynamically stable.    Attestation: A qualified resident physician was not available.     Procedure in detail:    After informed consent was obtained, patient was taken to the operating room and placed under general anesthesia. She was placed in the dorsal lithotomy position and the vagina was prepped and draped in the usual sterile fashion. A straight catheter was used to drain the bladder. A weighted speculum was placed in the vagina and a single tooth tenaculum was used to grasp the cervix. The cervix was easily and progressively dilated to a # 8 Nelida dilator. The hysteroscope was placed. The endometrial cavity was noted to be atrophic. Bilateral tubal ostia were noted. The hysteroscope was removed. A vigorous sharp curettage was performed until a gritty texture was noted in all four quadrants. The sharp curette was removed. The tenaculum was removed. Excellent hemostasis was achieved on the anterior lip of the cervix with a 2-0  chromic suture. The weighted speculum was removed. The patient was placed in the supine position and awakened. All counts were correct x 2 and she was taken to the recovery room in stable condition.

## 2017-06-20 NOTE — PLAN OF CARE
Patient prefers to have  Dylan Winslow present for discharge teaching. Please contact them @[phone]  .

## 2017-06-20 NOTE — BRIEF OP NOTE
[facility]  Brief Operative Note     SUMMARY     Surgery Date: 6/20/2017     Surgeon(s) and Role:     * Odilia Perez MD - Primary    Assisting Surgeon: None    Pre-op Diagnosis:  Thickened endometrium [R93.8]    Post-op Diagnosis:  Post-Op Diagnosis Codes:     * Thickened endometrium [R93.8]    Procedure(s) (LRB):  [id number] (N/A)    Anesthesia: General    Description of the findings of the procedure: see dictation    Findings/Key Components: atrophic endometrial cavity    Estimated Blood Loss: 10 ml         Specimens:   Specimen (12h ago through future)    Start     Ordered    06/20/17 1427  Specimen to Pathology - Surgery  Once     Comments:  1. Endometrial scrappings      06/20/17 1430          Discharge Note    SUMMARY     Admit Date: 6/20/2017    Discharge Date and Time:  06/20/2017 3:11 PM    Hospital Course (synopsis of major diagnoses, care, treatment, and services provided during the course of the hospital stay): underwent above procedure without complications, uncomplicated postop course     Final Diagnosis: Post-Op Diagnosis Codes:     * Thickened endometrium [R93.8]    Disposition: Home or Self Care    Follow Up/Patient Instructions:     Medications:  Reconciled Home Medications:   Current Discharge Medication List      START taking these medications    Details   oxycodone-acetaminophen (PERCOCET) 5-325 mg per tablet Take 1 tablet by mouth every 4 (four) hours as needed for Pain.  Qty: 10 tablet, Refills: 0      zolpidem (AMBIEN) 5 MG Tab Take 1 tablet (5 mg total) by mouth nightly as needed.  Qty: 30 tablet, Refills: 0         CONTINUE these medications which have NOT CHANGED    Details   amlodipine (NORVASC) 5 MG tablet Take 1 tablet (5 mg total) by mouth once daily.  Qty: 90 tablet, Refills: 3      atorvastatin (LIPITOR) 20 MG tablet Take 1 tablet (20 mg total) by mouth once daily.  Qty: 90 tablet, Refills: 3      DEXILANT 60 mg capsule        escitalopram oxalate (LEXAPRO) 20 MG tablet Take 1 tablet (20 mg total) by mouth once daily.  Qty: 90 tablet, Refills: 3      nitrofurantoin, macrocrystal-monohydrate, (MACROBID) 100 MG capsule Take 1 capsule (100 mg total) by mouth 2 (two) times daily.  Qty: 10 capsule, Refills: 0      progesterone (PROMETRIUM) 100 MG capsule TAKE ONE DAILY  Qty: 30 capsule, Refills: 0    Associated Diagnoses: Menopause      valsartan (DIOVAN) 160 MG tablet TAKE ONE BY MOUTH EVERY MORNING  Qty: 90 tablet, Refills: 0      valacyclovir (VALTREX) 1000 MG tablet TAKE (1/2) HALF BY MOUTH EVERY 12 HOURS  Qty: 30 tablet, Refills: 3         STOP taking these medications       trazodone (DESYREL) 50 MG tablet Comments:   Reason for Stopping:               Discharge Procedure Orders  Diet general       Follow-up Information     Odilia Perez MD.    Specialties:  Gynecology, Obstetrics, Obstetrics and Gynecology  Why:  I will call patient to discuss follow up  Contact information:  [address]  [address] [place] [id number] [phone]

## 2017-06-20 NOTE — INTERVAL H&P NOTE
The patient has been examined and the H&P has been reviewed:    I concur with the findings and no changes have occurred since H&P was written.    Anesthesia/Surgery risks, benefits and alternative options discussed and understood by patient/family.          Active Hospital Problems    Diagnosis  POA    Thickened endometrium [R93.8]  Yes      Resolved Hospital Problems    Diagnosis Date Resolved POA   No resolved problems to display.

## 2017-06-20 NOTE — ANESTHESIA POSTPROCEDURE EVALUATION
"Anesthesia Post Evaluation    Patient: Odilia Winslow    Procedure(s) Performed: Procedure(s) (LRB):  [id number] (N/A)    Final Anesthesia Type: general  Patient location during evaluation: PACU  Patient participation: Yes- Able to Participate  Level of consciousness: awake and alert  Post-procedure vital signs: reviewed and stable  Pain management: adequate  Airway patency: patent  PONV status at discharge: No PONV  Anesthetic complications: no      Cardiovascular status: blood pressure returned to baseline  Respiratory status: unassisted  Hydration status: euvolemic  Follow-up not needed.        Visit Vitals  BP (!) 144/75 (BP Location: Right arm, Patient Position: Sitting, BP Method: Automatic)   Pulse 80   Temp 37.1 °C (98.7 °F)   Resp 16   Ht 5' 7" (1.702 m)   Wt 81.6 kg (180 lb)   LMP 07/26/2016   SpO2 96%   Breastfeeding? No   BMI 28.19 kg/m²       Pain/Leda Score: Pain Assessment Performed: Yes (6/20/2017 10:02 AM)  Presence of Pain: denies (6/20/2017 10:02 AM)      "

## 2017-06-20 NOTE — TRANSFER OF CARE
"Anesthesia Transfer of Care Note    Patient: Odilia Winslow    Procedure(s) Performed: Procedure(s) (LRB):  [id number] (N/A)    Patient location: PACU    Anesthesia Type: general    Transport from OR: Transported from OR on room air with adequate spontaneous ventilation    Post pain: adequate analgesia    Post assessment: no apparent anesthetic complications    Post vital signs: stable    Level of consciousness: awake    Nausea/Vomiting: no nausea/vomiting    Complications: none    Transfer of care protocol was followed      Last vitals:   Visit Vitals  BP (!) 144/75 (BP Location: Right arm, Patient Position: Sitting, BP Method: Automatic)   Pulse 80   Temp 37.1 °C (98.7 °F)   Resp 16   Ht 5' 7" (1.702 m)   Wt 81.6 kg (180 lb)   LMP 07/26/2016   SpO2 96%   Breastfeeding? No   BMI 28.19 kg/m²     "

## 2017-07-14 ENCOUNTER — OFFICE VISIT (OUTPATIENT)
Dept: INTERNAL MEDICINE | Facility: CLINIC | Age: 57
End: 2017-07-14
Attending: INTERNAL MEDICINE
Payer: COMMERCIAL

## 2017-07-14 VITALS
HEART RATE: 87 BPM | HEIGHT: 67 IN | SYSTOLIC BLOOD PRESSURE: 134 MMHG | BODY MASS INDEX: 28.55 KG/M2 | DIASTOLIC BLOOD PRESSURE: 76 MMHG | OXYGEN SATURATION: 95 % | WEIGHT: 181.88 LBS

## 2017-07-14 DIAGNOSIS — E78.5 HYPERLIPIDEMIA, UNSPECIFIED HYPERLIPIDEMIA TYPE: ICD-10-CM

## 2017-07-14 DIAGNOSIS — Z00.00 ANNUAL PHYSICAL EXAM: Primary | ICD-10-CM

## 2017-07-14 DIAGNOSIS — Z11.59 NEED FOR HEPATITIS C SCREENING TEST: ICD-10-CM

## 2017-07-14 DIAGNOSIS — Z63.6 CAREGIVER STRESS: ICD-10-CM

## 2017-07-14 DIAGNOSIS — I10 HTN (HYPERTENSION), BENIGN: ICD-10-CM

## 2017-07-14 DIAGNOSIS — F41.1 GAD (GENERALIZED ANXIETY DISORDER): ICD-10-CM

## 2017-07-14 DIAGNOSIS — Z12.39 SCREENING FOR BREAST CANCER: ICD-10-CM

## 2017-07-14 PROCEDURE — 99396 PREV VISIT EST AGE 40-64: CPT | Mod: S$GLB,,, | Performed by: INTERNAL MEDICINE

## 2017-07-14 PROCEDURE — 99999 PR PBB SHADOW E&M-EST. PATIENT-LVL IV: CPT | Mod: PBBFAC,,, | Performed by: INTERNAL MEDICINE

## 2017-07-14 SDOH — SOCIAL DETERMINANTS OF HEALTH (SDOH): DEPENDENT RELATIVE NEEDING CARE AT HOME: Z63.6

## 2017-07-14 NOTE — PROGRESS NOTES
Subjective:       Patient ID: Odilia Winslow is a 57 y.o. female.    Chief Complaint: Establish Care    HPI     Here to [facility] and for annual   Taking exapro for anxiety and well controlled - reports stressful at home over past week - has one adult son with bipolar - he recently stopped his medications. Both sons are not getting along currently.   htn - bp controlled  hld- on statin    Andre kay at  - gi - following pt for IBS and utd on cscope  Gyn - [facility]    Is exercising regularly    Review of Systems    Objective:      Physical Exam   Constitutional: She is oriented to person, place, and time. She appears well-developed and well-nourished.   HENT:   Head: Normocephalic and atraumatic.   Right Ear: External ear normal.   Left Ear: External ear normal.   Nose: Nose normal.   Mouth/Throat: Oropharynx is clear and moist. No oropharyngeal exudate.   No carotid bruits   Eyes: Conjunctivae and EOM are normal.   Neck: Neck supple. No thyromegaly present.   Cardiovascular: Normal rate, regular rhythm, normal heart sounds and intact distal pulses.    Pulmonary/Chest: Effort normal and breath sounds normal.   Abdominal: Soft. Bowel sounds are normal.   Musculoskeletal: She exhibits no edema or tenderness.   Lymphadenopathy:     She has no cervical adenopathy.   Neurological: She is alert and oriented to person, place, and time. Coordination normal.   Skin: Skin is warm and dry.   Psychiatric: She has a normal mood and affect. Her behavior is normal. Judgment and thought content normal.       Assessment:     Annual physical exam  -     Hepatitis C antibody; Future; Expected date: 07/14/2017  -     Lipid panel; Future; Expected date: 07/14/2017  -     TSH; Future; Expected date: 07/14/2017  -     Comprehensive metabolic panel; Future; Expected date: 07/14/2017  Recommend daily sunscreen, cardiovascular exercise min 30 min 5 days per week. Seatbelts routinely.    Caregiver stress: cont ssri - refer for  counseling and CM for resources for caregivers/support groups for pt and her  to attend   -     Ambulatory Referral to Psychology  -     Ambulatory referral to Outpatient Case Management    HTN (hypertension), benign: controlled, cont med    Hyperlipidemia, unspecified hyperlipidemia type: check lab and cont statin    CLAY (generalized anxiety disorder): stable, cont ssri  -     Ambulatory Referral to Psychology    Screening for breast cancer  -     Mammo Digital Screening Bilateral With CAD; Future; Expected date: 07/14/2017    Need for hepatitis C screening test  -     Hepatitis C antibody; Future; Expected date: 07/14/2017

## 2017-07-18 ENCOUNTER — OUTPATIENT CASE MANAGEMENT (OUTPATIENT)
Dept: ADMINISTRATIVE | Facility: OTHER | Age: 57
End: 2017-07-18

## 2017-07-18 NOTE — PROGRESS NOTES
Please note the following patients information has been forwarded to [facility] for Case Management or .     Please see the media section in patient's chart for additional details.     Please contact Outpatient Complex care Management at ext [phone] with any questions.     Thank you,     Patsy Sneed, SSC

## 2017-07-19 ENCOUNTER — HOSPITAL ENCOUNTER (OUTPATIENT)
Dept: RADIOLOGY | Facility: HOSPITAL | Age: 57
Discharge: HOME | End: 2017-07-19
Attending: INTERNAL MEDICINE
Payer: COMMERCIAL

## 2017-07-19 VITALS — BODY MASS INDEX: 28.41 KG/M2 | WEIGHT: 181 LBS | HEIGHT: 67 IN

## 2017-07-19 DIAGNOSIS — Z12.39 SCREENING FOR BREAST CANCER: ICD-10-CM

## 2017-07-19 PROCEDURE — 77063 BREAST TOMOSYNTHESIS BI: CPT | Mod: 26,,, | Performed by: RADIOLOGY

## 2017-07-19 PROCEDURE — 77067 SCR MAMMO BI INCL CAD: CPT | Mod: 26,,, | Performed by: RADIOLOGY

## 2017-07-19 PROCEDURE — 77067 SCR MAMMO BI INCL CAD: CPT | Mod: TC

## 2017-07-20 DIAGNOSIS — Z78.0 MENOPAUSE: ICD-10-CM

## 2017-07-20 RX ORDER — ATORVASTATIN CALCIUM 20 MG/1
20 TABLET, FILM COATED ORAL DAILY
Qty: 90 TABLET | Refills: 3 | Status: SHIPPED | OUTPATIENT
Start: 2017-07-20 | End: 2018-09-11 | Stop reason: SDUPTHER

## 2017-07-20 RX ORDER — PROGESTERONE 100 MG/1
100 CAPSULE ORAL DAILY
Qty: 30 CAPSULE | Refills: 11 | Status: SHIPPED | OUTPATIENT
Start: 2017-07-20 | End: 2017-11-27 | Stop reason: SDUPTHER

## 2017-07-20 NOTE — TELEPHONE ENCOUNTER
----- Message from Rebeca Lyons sent at 7/20/2017  8:04 AM CDT -----  Contact: Patient herself  X  1st Request  _  2nd Request  _  3rd Request    Please refill the medication(s) listed below. Please call the patient when the prescription(s) is ready for  at the phone number [phone] .    Medication #1  ATORVASTATIN  20 mg    Preferred Pharmacy: [facility]  Stacie Whitfield [address]# [phone]  /  Fax# [phone]

## 2017-07-21 ENCOUNTER — TELEPHONE (OUTPATIENT)
Dept: INTERNAL MEDICINE | Facility: CLINIC | Age: 57
End: 2017-07-21

## 2017-07-21 DIAGNOSIS — R74.01 TRANSAMINITIS: ICD-10-CM

## 2017-07-21 DIAGNOSIS — R73.01 IFG (IMPAIRED FASTING GLUCOSE): Primary | ICD-10-CM

## 2017-07-21 DIAGNOSIS — E83.52 HYPERCALCEMIA: ICD-10-CM

## 2017-07-21 DIAGNOSIS — R74.8 ELEVATED ALKALINE PHOSPHATASE LEVEL: ICD-10-CM

## 2017-07-22 NOTE — TELEPHONE ENCOUNTER
Message sent to pt via my chart with lab results and updates to plan.   Please schedule labs in 3-4 weeks.

## 2017-08-03 RX ORDER — ESCITALOPRAM OXALATE 20 MG/1
20 TABLET ORAL DAILY
Qty: 90 TABLET | Refills: 3 | Status: SHIPPED | OUTPATIENT
Start: 2017-08-03 | End: 2017-08-14 | Stop reason: SDUPTHER

## 2017-08-03 RX ORDER — AMLODIPINE BESYLATE 5 MG/1
5 TABLET ORAL DAILY
Qty: 90 TABLET | Refills: 3 | Status: SHIPPED | OUTPATIENT
Start: 2017-08-03 | End: 2017-08-14 | Stop reason: SDUPTHER

## 2017-08-03 NOTE — TELEPHONE ENCOUNTER
----- Message from Mallory Dillard sent at 8/3/2017 12:07 PM CDT -----  Contact: Self  X   1st Request  _  2nd Request  _  3rd Request    Please refill the medication(s) listed below. Please call the patient when the prescription(s) is ready for  at the phone number [phone] .    Medication #1 amlodipine (NORVASC) 5 MG tablet(new prescription)    Medication #2 escitalopram oxalate (LEXAPRO) 20 MG tablet(new prescription)      Preferred Pharmacy: [facility] at [phone] fax [phone]

## 2017-08-09 ENCOUNTER — LAB VISIT (OUTPATIENT)
Dept: LAB | Facility: HOSPITAL | Age: 57
End: 2017-08-09
Attending: INTERNAL MEDICINE
Payer: COMMERCIAL

## 2017-08-09 DIAGNOSIS — R74.01 TRANSAMINITIS: ICD-10-CM

## 2017-08-09 DIAGNOSIS — E83.52 HYPERCALCEMIA: ICD-10-CM

## 2017-08-09 DIAGNOSIS — R74.8 ELEVATED ALKALINE PHOSPHATASE LEVEL: ICD-10-CM

## 2017-08-09 DIAGNOSIS — R73.01 IFG (IMPAIRED FASTING GLUCOSE): ICD-10-CM

## 2017-08-09 LAB
ALBUMIN SERPL BCP-MCNC: 4.1 G/DL
ALP SERPL-CCNC: 131 U/L
ALT SERPL W/O P-5'-P-CCNC: 87 U/L
ANION GAP SERPL CALC-SCNC: 8 MMOL/L
AST SERPL-CCNC: 63 U/L
BILIRUB SERPL-MCNC: 0.5 MG/DL
BUN SERPL-MCNC: 14 MG/DL
CALCIUM SERPL-MCNC: 11.2 MG/DL
CHLORIDE SERPL-SCNC: 105 MMOL/L
CO2 SERPL-SCNC: 27 MMOL/L
CREAT SERPL-MCNC: 0.8 MG/DL
EST. GFR  (AFRICAN AMERICAN): >60 ML/MIN/1.73 M^2
EST. GFR  (NON AFRICAN AMERICAN): >60 ML/MIN/1.73 M^2
ESTIMATED AVG GLUCOSE: 126 MG/DL
GLUCOSE SERPL-MCNC: 128 MG/DL
HBA1C MFR BLD HPLC: 6 %
POTASSIUM SERPL-SCNC: 4.5 MMOL/L
PROT SERPL-MCNC: 7.6 G/DL
SODIUM SERPL-SCNC: 140 MMOL/L

## 2017-08-09 PROCEDURE — 83036 HEMOGLOBIN GLYCOSYLATED A1C: CPT

## 2017-08-09 PROCEDURE — 80053 COMPREHEN METABOLIC PANEL: CPT

## 2017-08-09 PROCEDURE — 84075 ASSAY ALKALINE PHOSPHATASE: CPT

## 2017-08-09 PROCEDURE — 36415 COLL VENOUS BLD VENIPUNCTURE: CPT | Mod: PO

## 2017-08-14 RX ORDER — AMLODIPINE BESYLATE 5 MG/1
5 TABLET ORAL DAILY
Qty: 90 TABLET | Refills: 3 | Status: SHIPPED | OUTPATIENT
Start: 2017-08-14 | End: 2018-12-04 | Stop reason: SDUPTHER

## 2017-08-14 RX ORDER — ESCITALOPRAM OXALATE 20 MG/1
20 TABLET ORAL DAILY
Qty: 90 TABLET | Refills: 3 | Status: SHIPPED | OUTPATIENT
Start: 2017-08-14 | End: 2018-11-16 | Stop reason: SDUPTHER

## 2017-08-14 NOTE — TELEPHONE ENCOUNTER
Spoke with pt who advises that she did switch to Dr. Izaguirre and that she is taking care of her refills.    Thus, advised that we will forward to Dr. Izaguirre.    Verbalized understanding.    Thanks.

## 2017-08-15 LAB
ALP BONE CFR SERPL: 49 U/L (ref 5–58)
ALP BONE SERPL-CCNC: 41 % (ref 16–56)
ALP INTEST CFR SERPL: 0 U/L
ALP INTEST SERPL-CCNC: 0 %
ALP LIVER CFR SERPL: 71 U/L (ref 5–93)
ALP LIVER SERPL-CCNC: 59 % (ref 44–84)
ALP SERPL-CCNC: 120 U/L (ref 33–130)

## 2017-08-16 ENCOUNTER — TELEPHONE (OUTPATIENT)
Dept: INTERNAL MEDICINE | Facility: CLINIC | Age: 57
End: 2017-08-16

## 2017-08-16 DIAGNOSIS — E83.52 HYPERCALCEMIA: Primary | ICD-10-CM

## 2017-08-16 NOTE — TELEPHONE ENCOUNTER
Called pt and left a vm stating that I was calling regarding her lab results.  Left office number for pt to call back for her lab results.

## 2017-08-16 NOTE — TELEPHONE ENCOUNTER
Please notify pt that labs returned.   A1c which is a measure of average sugar over 3 month period is in prediabetes range. rec reg exercise and wt loss to improve this and prevent progression to diabetes. If lab worsens or does not improve when repeated with annual labs then will rec medication called metformin at that time    Calcium is still elevated - please inquire if she is taking any otc calcium supplements? If so then rec she stop them and repeat labs in 1 week.   And check iPTH, vit d and ionized Ca++    Please let me know if taking Ca supplements and schedule labs - thanks!

## 2017-08-17 ENCOUNTER — TELEPHONE (OUTPATIENT)
Dept: OBSTETRICS AND GYNECOLOGY | Facility: CLINIC | Age: 57
End: 2017-08-17

## 2017-08-17 RX ORDER — NITROFURANTOIN 25; 75 MG/1; MG/1
100 CAPSULE ORAL 2 TIMES DAILY
Qty: 14 CAPSULE | Refills: 0 | Status: SHIPPED | OUTPATIENT
Start: 2017-08-17 | End: 2017-08-24

## 2017-08-17 NOTE — TELEPHONE ENCOUNTER
Pt thinks she has a UTI.  C/o frequency and burning with urination.  Offered her an appt she said her son has depression and not having a good day, she would rather not leave him right now.  Advised if not better after 3 days she should make and appt to be seen.      Macrobid pended

## 2017-08-17 NOTE — TELEPHONE ENCOUNTER
Chris pt - pt thinks she has a bladder infection. She said she is constantly urinating and is uncomfortable and would like to know if  can call something in for her.

## 2017-08-24 RX ORDER — VALSARTAN 160 MG/1
160 TABLET ORAL EVERY MORNING
Qty: 90 TABLET | Refills: 2 | Status: SHIPPED | OUTPATIENT
Start: 2017-08-24 | End: 2018-06-20 | Stop reason: SDUPTHER

## 2017-08-24 NOTE — TELEPHONE ENCOUNTER
----- Message from Taylor Voss sent at 8/24/2017 11:01 AM CDT -----  Contact: Patient  _  1st Request  _  2nd Request  _  3rd Request    Please refill the medication(s) listed below. Please call the patient when the prescription(s) is ready for  at the phone number [phone]) .    Medication #1valsartan (DIOVAN) 160 MG tablet 90 tablet     Medication #2      Preferred Pharmacy:

## 2017-08-31 ENCOUNTER — TELEPHONE (OUTPATIENT)
Dept: INTERNAL MEDICINE | Facility: CLINIC | Age: 57
End: 2017-08-31

## 2017-08-31 NOTE — TELEPHONE ENCOUNTER
----- Message from Luann Cramer sent at 8/31/2017 10:15 AM CDT -----  Contact: pt  _x  1st Request  _  2nd Request  _  3rd Request      Who:pt    Why:  Returning call back    What Number to Call Back: [phone]    When to Expect a call back: (Before the end of the day)   -- if call after 3:00 call back will be tomorrow.

## 2017-08-31 NOTE — TELEPHONE ENCOUNTER
Pt has been informed of pcp's message from 8/16/2017. Pt's repeat labs have been scheduled as requested. Pt states that she was advised by pcp to stop Lipitor rx while completing previous labs on 8/9/2017. Pt states that she would like to know if it is okay to start taking her Lipitor rx again. Pt currently does not take any over the counter calcium at this time but states that she does take Tums.Pt has been informed that Tums contains high amounts of calcium.

## 2017-09-05 NOTE — TELEPHONE ENCOUNTER
Called pt and stated  rec, pt states that she verbally understands and has no further questions or concerns

## 2017-09-07 ENCOUNTER — LAB VISIT (OUTPATIENT)
Dept: LAB | Facility: HOSPITAL | Age: 57
End: 2017-09-07
Attending: INTERNAL MEDICINE
Payer: COMMERCIAL

## 2017-09-07 DIAGNOSIS — E83.52 HYPERCALCEMIA: ICD-10-CM

## 2017-09-07 LAB
25(OH)D3+25(OH)D2 SERPL-MCNC: 13 NG/ML
ANION GAP SERPL CALC-SCNC: 13 MMOL/L
BUN SERPL-MCNC: 12 MG/DL
CA-I BLDV-SCNC: 1.43 MMOL/L
CALCIUM SERPL-MCNC: 11.1 MG/DL
CHLORIDE SERPL-SCNC: 103 MMOL/L
CO2 SERPL-SCNC: 20 MMOL/L
CREAT SERPL-MCNC: 0.7 MG/DL
EST. GFR  (AFRICAN AMERICAN): >60 ML/MIN/1.73 M^2
EST. GFR  (NON AFRICAN AMERICAN): >60 ML/MIN/1.73 M^2
GLUCOSE SERPL-MCNC: 122 MG/DL
POTASSIUM SERPL-SCNC: 4 MMOL/L
PTH-INTACT SERPL-MCNC: 112 PG/ML
SODIUM SERPL-SCNC: 136 MMOL/L

## 2017-09-07 PROCEDURE — 83970 ASSAY OF PARATHORMONE: CPT

## 2017-09-07 PROCEDURE — 36415 COLL VENOUS BLD VENIPUNCTURE: CPT | Mod: PO

## 2017-09-07 PROCEDURE — 82330 ASSAY OF CALCIUM: CPT

## 2017-09-07 PROCEDURE — 80048 BASIC METABOLIC PNL TOTAL CA: CPT

## 2017-09-07 PROCEDURE — 82306 VITAMIN D 25 HYDROXY: CPT

## 2017-09-12 ENCOUNTER — TELEPHONE (OUTPATIENT)
Dept: INTERNAL MEDICINE | Facility: CLINIC | Age: 57
End: 2017-09-12

## 2017-09-12 DIAGNOSIS — E55.9 VITAMIN D DEFICIENCY: Primary | ICD-10-CM

## 2017-09-12 DIAGNOSIS — E83.52 HYPERCALCEMIA: ICD-10-CM

## 2017-09-12 DIAGNOSIS — E21.3 HYPERPARATHYROIDISM: ICD-10-CM

## 2017-09-12 RX ORDER — ERGOCALCIFEROL 1.25 MG/1
50000 CAPSULE ORAL
Qty: 12 CAPSULE | Refills: 0 | Status: SHIPPED | OUTPATIENT
Start: 2017-09-12 | End: 2017-10-30

## 2017-09-12 NOTE — TELEPHONE ENCOUNTER
Pt called requesting results of labs from 9/7/2017. Pt states she viewed the results online but not sure what it means. Please advise/authorize?

## 2017-09-12 NOTE — TELEPHONE ENCOUNTER
----- Message from Rebeca Lyons sent at 9/12/2017  8:13 AM CDT -----  Contact: Patient Herself / ph# [phone]  X 1st Request  _  2nd Request  _  3rd Request    Who: Odilia Winslow (mrn# [id number])    Why: Patient called requesting further explanation of her test results. Please give a call back at your earliest convenience.       THANKS!    What Number to Call Back: [phone]    When to Expect a call back: (With in 24 hours)

## 2017-09-12 NOTE — TELEPHONE ENCOUNTER
Called pt and reviewed labs with her - will order 24h urine calcium and bone density  Please arrange 24h urine test as home collect to be dropped off at lab  Please schedule vit d level in 3 months.   Please schedule pt with endocrine for hypercalcemia - first available and please add her to waiting list in case earlier appt become available.     All questions were answered and pt verbalized understanding of plan. rtc and er prompts reviewed

## 2017-09-13 ENCOUNTER — TELEPHONE (OUTPATIENT)
Dept: INTERNAL MEDICINE | Facility: CLINIC | Age: 57
End: 2017-09-13

## 2017-09-13 NOTE — TELEPHONE ENCOUNTER
Left vm for patient to return call to get her schedule  10:25 AM  Patient was scheduled for lab and endocrinology follow up and stated that she will  her 24 hour urine collection kit.

## 2017-09-13 NOTE — TELEPHONE ENCOUNTER
"----- Message from Rebeca Lyons sent at 9/13/2017 10:11 AM CDT -----  Contact: Patient herself / Cell# [phone]  X 1st Request  _  2nd Request  _  3rd Request    Who: Odilia Winslow (mrn# [id number])    Why: Patient called and said, "she's returning your call and would like you to please call again."     Please do so at your earliest convenience.        THANKS!    What Number to Call Back: [phone]    When to Expect a call back: (With in 24 hours)      10:26 AM  Patient was scheduled for f/u labs and endocrinologist. Will  urine collection kit tomorrow.                  "

## 2017-09-18 ENCOUNTER — OFFICE VISIT (OUTPATIENT)
Dept: ENDOCRINOLOGY | Facility: CLINIC | Age: 57
End: 2017-09-18
Payer: COMMERCIAL

## 2017-09-18 VITALS
BODY MASS INDEX: 28.13 KG/M2 | SYSTOLIC BLOOD PRESSURE: 163 MMHG | DIASTOLIC BLOOD PRESSURE: 81 MMHG | HEIGHT: 67 IN | WEIGHT: 179.25 LBS | HEART RATE: 77 BPM

## 2017-09-18 DIAGNOSIS — E21.3 HYPERPARATHYROIDISM: ICD-10-CM

## 2017-09-18 PROCEDURE — 99204 OFFICE O/P NEW MOD 45 MIN: CPT | Mod: S$GLB,,, | Performed by: INTERNAL MEDICINE

## 2017-09-18 PROCEDURE — 3077F SYST BP >= 140 MM HG: CPT | Mod: S$GLB,,, | Performed by: INTERNAL MEDICINE

## 2017-09-18 PROCEDURE — 99999 PR PBB SHADOW E&M-EST. PATIENT-LVL III: CPT | Mod: PBBFAC,,, | Performed by: INTERNAL MEDICINE

## 2017-09-18 PROCEDURE — 3008F BODY MASS INDEX DOCD: CPT | Mod: S$GLB,,, | Performed by: INTERNAL MEDICINE

## 2017-09-18 PROCEDURE — 3079F DIAST BP 80-89 MM HG: CPT | Mod: S$GLB,,, | Performed by: INTERNAL MEDICINE

## 2017-09-18 NOTE — LETTER
September 18, 2017      Meredith Izaguirre MD  [address]  [facility] [address]           [facility]  [address]  [facility] [address]  Phone: [phone]  Fax: [phone]          Patient: Odilia Winslow   MR Number: [id number]   YOB: 1960   Date of Visit: 9/18/2017       Dear Dr. Meredith Izaguirre:    Thank you for referring Odilia Winslow to me for evaluation. Attached you will find relevant portions of my assessment and plan of care.    If you have questions, please do not hesitate to call me. I look forward to following Odilai Winslow along with you.    Sincerely,    Lisseth Art, NP    Enclosure  CC:  No Recipients    If you would like to receive this communication electronically, please contact [email or web address] or [phone] to request more information on [facility] Link access.    For providers and/or their staff who would like to refer a patient to [facility], please contact us through our one-stop-shop provider referral line, [facility], at [phone].    If you feel you have received this communication in error or would no longer like to receive these types of communications, please e-mail [email or web address]

## 2017-09-18 NOTE — PROGRESS NOTES
Chief Complaint: Consult      HPI:  Odilia Winslow is 57 y.o. female here today for evaluation of Hypercalcemia   She was first noticed to have hypercalcemia in 07/2017   PTH was first checked 09/2017     She denies HCTZ or lithium use   She is not taking calcium supplements     She denies constipation   Denies kidney stones or kidney disease   Denies weakness   Denies nausea     She has never fractured     Review of Systems   Constitutional: Positive for fatigue. Negative for unexpected weight change.   HENT: Positive for trouble swallowing.    Eyes: Negative for visual disturbance.   Respiratory: Negative for shortness of breath.    Cardiovascular: Negative for chest pain.   Gastrointestinal: Negative for abdominal pain and constipation.   Musculoskeletal: Negative for arthralgias and myalgias.   Skin: Negative for wound.   Allergic/Immunologic: Negative for immunocompromised state.   Neurological: Negative for dizziness and headaches.   Hematological: Does not bruise/bleed easily.   Psychiatric/Behavioral: Negative for confusion and sleep disturbance. The patient is nervous/anxious.       Labs:  Lab Results   Component Value Date     09/07/2017    K 4.0 09/07/2017     09/07/2017    CO2 20 (L) 09/07/2017     (H) 09/07/2017    BUN 12 09/07/2017    CREATININE 0.7 09/07/2017    CALCIUM 11.1 (H) 09/07/2017    PROT 7.6 08/09/2017    ALBUMIN 4.1 08/09/2017    BILITOT 0.5 08/09/2017    ALKPHOS 131 08/09/2017    AST 63 (H) 08/09/2017    ALT 87 (H) 08/09/2017    ANIONGAP 13 09/07/2017    ESTGFRAFRICA >60.0 09/07/2017    EGFRNONAA >60.0 09/07/2017     Lab Results   Component Value Date    OUUSOXED17NR 13 (L) 09/07/2017     Results for ODILIA WINSLOW (MRN [id number]) as of 9/18/2017 12:10   Ref. Range 9/7/2017 12:37   Calcium, Ion Latest Ref Range: 1.06 - 1.42 mmol/L 1.43 (H)       Last BMD:   No results found for this or any previous visit.    Physical Exam   Constitutional: She is oriented to  person, place, and time. She appears well-developed. No distress.   HENT:   Head: Atraumatic.   Right Ear: External ear normal.   Left Ear: External ear normal.   Nose: Nose normal.   Hearing normal  Dentition good    Neck: Neck supple. No tracheal deviation present. No thyromegaly present.   Cardiovascular: Normal rate.    No murmur heard.  Pulmonary/Chest: Effort normal and breath sounds normal.   Abdominal: Soft. There is no tenderness. No hernia.   Musculoskeletal: She exhibits no edema.   Gait normal  No clubbing or cyanosis noted   Lymphadenopathy:     She has no cervical adenopathy.   Neurological: She is alert and oriented to person, place, and time.   Skin: Skin is warm and dry. No rash noted.   No nodules       Psychiatric: She has a normal mood and affect. Judgment normal.   Nursing note and vitals reviewed.      Assessment and Plan:  1. Hyperparathyroidism  -- suspect primary hyperparathyroidism in the setting of hypercalcemia   -- advised on the importance of adequate hydration   -- avoid prolonged periods of immobilization   -- reviewed indications for surgery   -- obtain labs, urine study   -- obtain imaging studies for localization   -- arrange BMD   -     Calcium, Timed Urine; Future  -     Creatinine, urine, timed; Future  -     Renal function panel; Future; Expected date: 09/18/2017  -     DXA Bone Density Spine And Hip; Future; Expected date: 09/18/2017  -     US Soft Tissue Head Neck Thyroid; Future; Expected date: 09/18/2017  -     NM Parathyroid Scan with SPECT Routine; Future; Expected date: 09/18/2017      Case discussed in consultation with Dr. Wilks   Recommendations were discussed with the patient in detail   The patient verbalized understanding and agrees with the treatment plan as outlined above     RTC in 4 weeks for results

## 2017-09-21 ENCOUNTER — LAB VISIT (OUTPATIENT)
Dept: LAB | Facility: HOSPITAL | Age: 57
End: 2017-09-21
Attending: INTERNAL MEDICINE
Payer: COMMERCIAL

## 2017-09-21 DIAGNOSIS — E21.3 HYPERPARATHYROIDISM: ICD-10-CM

## 2017-09-21 LAB
ALBUMIN SERPL BCP-MCNC: 3.9 G/DL
ANION GAP SERPL CALC-SCNC: 10 MMOL/L
BUN SERPL-MCNC: 13 MG/DL
CALCIUM SERPL-MCNC: 10.7 MG/DL
CHLORIDE SERPL-SCNC: 106 MMOL/L
CO2 SERPL-SCNC: 23 MMOL/L
CREAT SERPL-MCNC: 0.7 MG/DL
EST. GFR  (AFRICAN AMERICAN): >60 ML/MIN/1.73 M^2
EST. GFR  (NON AFRICAN AMERICAN): >60 ML/MIN/1.73 M^2
GLUCOSE SERPL-MCNC: 99 MG/DL
PHOSPHATE SERPL-MCNC: 2.5 MG/DL
POTASSIUM SERPL-SCNC: 4.3 MMOL/L
SODIUM SERPL-SCNC: 139 MMOL/L

## 2017-09-21 PROCEDURE — 80069 RENAL FUNCTION PANEL: CPT

## 2017-09-21 PROCEDURE — 36415 COLL VENOUS BLD VENIPUNCTURE: CPT | Mod: PO

## 2017-09-27 ENCOUNTER — TELEPHONE (OUTPATIENT)
Dept: RADIOLOGY | Facility: HOSPITAL | Age: 57
End: 2017-09-27

## 2017-09-28 ENCOUNTER — HOSPITAL ENCOUNTER (OUTPATIENT)
Dept: ENDOCRINOLOGY | Facility: CLINIC | Age: 57
Discharge: HOME | End: 2017-09-28
Attending: INTERNAL MEDICINE
Payer: COMMERCIAL

## 2017-09-28 ENCOUNTER — HOSPITAL ENCOUNTER (OUTPATIENT)
Dept: RADIOLOGY | Facility: HOSPITAL | Age: 57
Discharge: HOME | End: 2017-09-28
Attending: INTERNAL MEDICINE
Payer: COMMERCIAL

## 2017-09-28 ENCOUNTER — HOSPITAL ENCOUNTER (OUTPATIENT)
Dept: RADIOLOGY | Facility: CLINIC | Age: 57
Discharge: HOME | End: 2017-09-28
Attending: INTERNAL MEDICINE
Payer: COMMERCIAL

## 2017-09-28 DIAGNOSIS — E21.3 HYPERPARATHYROIDISM: ICD-10-CM

## 2017-09-28 PROCEDURE — 77080 DXA BONE DENSITY AXIAL: CPT | Mod: 26,,, | Performed by: INTERNAL MEDICINE

## 2017-09-28 PROCEDURE — 77080 DXA BONE DENSITY AXIAL: CPT | Mod: TC

## 2017-09-28 PROCEDURE — 78071 PARATHYRD PLANAR W/WO SUBTRJ: CPT | Mod: TC

## 2017-09-28 PROCEDURE — 78071 PARATHYRD PLANAR W/WO SUBTRJ: CPT | Mod: 26,,, | Performed by: RADIOLOGY

## 2017-09-28 PROCEDURE — 76536 US EXAM OF HEAD AND NECK: CPT | Mod: S$GLB,,, | Performed by: INTERNAL MEDICINE

## 2017-10-17 ENCOUNTER — OFFICE VISIT (OUTPATIENT)
Dept: ENDOCRINOLOGY | Facility: CLINIC | Age: 57
End: 2017-10-17
Payer: COMMERCIAL

## 2017-10-17 VITALS
WEIGHT: 180.56 LBS | BODY MASS INDEX: 28.34 KG/M2 | DIASTOLIC BLOOD PRESSURE: 83 MMHG | HEART RATE: 87 BPM | RESPIRATION RATE: 16 BRPM | HEIGHT: 67 IN | SYSTOLIC BLOOD PRESSURE: 151 MMHG

## 2017-10-17 DIAGNOSIS — E21.0 PRIMARY HYPERPARATHYROIDISM: Primary | ICD-10-CM

## 2017-10-17 PROCEDURE — 99214 OFFICE O/P EST MOD 30 MIN: CPT | Mod: S$GLB,,, | Performed by: INTERNAL MEDICINE

## 2017-10-17 PROCEDURE — 99999 PR PBB SHADOW E&M-EST. PATIENT-LVL IV: CPT | Mod: PBBFAC,,, | Performed by: INTERNAL MEDICINE

## 2017-10-17 NOTE — PATIENT INSTRUCTIONS
Stop Ergo     Start over the counter vitamin D3 at 1,000 IU's once daily     Once you have a surgery date from Dr. Loza, please call me so when can administer Ergo 4-5 days before surgery

## 2017-10-17 NOTE — PROGRESS NOTES
Chief Complaint: No chief complaint on file.      HPI:  Odilia Winslow is 57 y.o. female presenting to clinic today to discuss test results   She was last seen in 09/2017 for initial evaluation of Hypercalcemia     She was first noticed to have hypercalcemia in 07/2017   PTH was first checked 09/2017 and it was elevated at 112.0 pg/mL     Initial work up included 24 hour urine studies for calcium/creatinine, BMD, NM parathyroid scan and thyroid ultrasound     A parathyroid adenoma was identified on NM parathyroid scan performed on 09/28/2017  Urine studies were also consistent with Primary Hyperparathyroidism     The patient denies use of HCTZ or lithium   She is not taking calcium supplements     She denies constipation   Denies kidney stones or kidney disease   Denies weakness   Denies nausea     She has never fractured     Review of Systems   Constitutional: Positive for fatigue. Negative for unexpected weight change.   HENT: Positive for trouble swallowing.    Eyes: Negative for visual disturbance.   Respiratory: Negative for shortness of breath.    Cardiovascular: Negative for chest pain.   Gastrointestinal: Negative for abdominal pain and constipation.   Musculoskeletal: Negative for arthralgias and myalgias.   Neurological: Negative for dizziness and headaches.   Psychiatric/Behavioral: Negative for confusion and sleep disturbance.      Labs:  Results for ODILIA WINSLOW (MRN [id number]) as of 10/17/2017 14:16   Ref. Range 9/7/2017 12:37   PTH Latest Ref Range: 9.0 - 77.0 pg/mL 112.0 (H)     Results for ODILIA WINSLOW (MRN [id number]) as of 10/17/2017 14:16   Ref. Range 9/21/2017 10:34   Calcium, Urine Latest Ref Range: 0.0 - 15.0 mg/dL 8.3   Calcium, 24H Urine Latest Ref Range: 4 - 12 mg/Hr 9   CA Urine (mg/Spec) Latest Units: mg/Spec 224   Creatinine, Ur (mg/spec) Latest Units: mg/Spec 756.0     Lab Results   Component Value Date     09/21/2017    K 4.3 09/21/2017     09/21/2017    CO2  23 09/21/2017    GLU 99 09/21/2017    BUN 13 09/21/2017    CREATININE 0.7 09/21/2017    CALCIUM 10.7 (H) 09/21/2017    PROT 7.6 08/09/2017    ALBUMIN 3.9 09/21/2017    BILITOT 0.5 08/09/2017    ALKPHOS 131 08/09/2017    AST 63 (H) 08/09/2017    ALT 87 (H) 08/09/2017    ANIONGAP 10 09/21/2017    ESTGFRAFRICA >60.0 09/21/2017    EGFRNONAA >60.0 09/21/2017     Lab Results   Component Value Date    PRHFDOLA70RC 13 (L) 09/07/2017     Last BMD: from 10/5/2017:   Impression    Low bone mass/osteopenia of the lumbar spine and femoral neck. FRAX calculations do not suggest treatment.     NM parathyroid scan: from 09/28/2017  Impression    Parathyroid adenoma at or just below the lower pole of the left lobe of the thyroid gland.       Physical Exam   Constitutional: She is oriented to person, place, and time. She appears well-developed. No distress.   HENT:   Head: Normocephalic and atraumatic.   Neck: Neck supple. No tracheal deviation present. No thyromegaly present.   Cardiovascular: Normal rate.    No murmur heard.  Pulmonary/Chest: Effort normal and breath sounds normal.   Abdominal: Soft. There is no tenderness. No hernia.   Musculoskeletal: She exhibits no edema.   Lymphadenopathy:     She has no cervical adenopathy.   Neurological: She is alert and oriented to person, place, and time.   Skin: Skin is warm and dry. No rash noted.          Psychiatric: She has a normal mood and affect. Judgment normal.   Nursing note and vitals reviewed.      Assessment and Plan:  1. Primary Hyperparathyroidism - left parathyroid adenoma   -- refer to endocrine surgery - Dr. Loza for surgical consultation   -- indications for surgery include serum calcium 1.0 mg/dL above upper limit of normal. Corrected serum calcium = 11.9 mg/dL from 09/21/2017   -- advised on the importance of adequate hydration   -- avoid prolonged periods of immobilization   -- stop Ergo for now   -- start OTC - Vitamin D3 at 1,000 IU's once daily   -- once she  receives surgery date, she is instructed to notify our office so that we can resume Ergo 4-5 days prior to surgery       Case discussed in consultation with Dr. Gonsalves   Recommendations were discussed with the patient in detail  The patient verbalized understanding and agrees with the plan outlined as above.     RTC after surgery for follow up and repeat labs     She is instructed to notify the office with any concerns or questions

## 2017-10-23 ENCOUNTER — TELEPHONE (OUTPATIENT)
Dept: SURGERY | Facility: CLINIC | Age: 57
End: 2017-10-23

## 2017-10-23 NOTE — TELEPHONE ENCOUNTER
----- Message from Juancarlos Corey sent at 10/23/2017  8:20 AM CDT -----  Shirlene,    Pt said Dr. Helton nurse was suppose to have you call her to set up appt. I scheduled appt but pt wants to you to call her and make sure 11/9/17 is okay. Please call pt at [phone]

## 2017-10-30 ENCOUNTER — TELEPHONE (OUTPATIENT)
Dept: OBSTETRICS AND GYNECOLOGY | Facility: CLINIC | Age: 57
End: 2017-10-30

## 2017-10-30 ENCOUNTER — OFFICE VISIT (OUTPATIENT)
Dept: OBSTETRICS AND GYNECOLOGY | Facility: CLINIC | Age: 57
End: 2017-10-30
Payer: COMMERCIAL

## 2017-10-30 VITALS
SYSTOLIC BLOOD PRESSURE: 140 MMHG | BODY MASS INDEX: 28.08 KG/M2 | WEIGHT: 178.88 LBS | HEIGHT: 67 IN | DIASTOLIC BLOOD PRESSURE: 82 MMHG

## 2017-10-30 DIAGNOSIS — N89.8 VAGINAL DISCHARGE: ICD-10-CM

## 2017-10-30 DIAGNOSIS — R31.9 HEMATURIA, UNSPECIFIED TYPE: ICD-10-CM

## 2017-10-30 DIAGNOSIS — Z78.0 MENOPAUSE: ICD-10-CM

## 2017-10-30 DIAGNOSIS — R82.90 ABNORMAL FINDING IN URINE: ICD-10-CM

## 2017-10-30 DIAGNOSIS — R30.0 DYSURIA: Primary | ICD-10-CM

## 2017-10-30 LAB
BILIRUB SERPL-MCNC: ABNORMAL MG/DL
BLOOD URINE, POC: 250
CANDIDA RRNA VAG QL PROBE: NEGATIVE
COLOR, POC UA: YELLOW
G VAGINALIS RRNA GENITAL QL PROBE: NEGATIVE
GLUCOSE UR QL STRIP: NORMAL
KETONES UR QL STRIP: ABNORMAL
LEUKOCYTE ESTERASE URINE, POC: ABNORMAL
NITRITE, POC UA: ABNORMAL
PH, POC UA: 5
PROTEIN, POC: ABNORMAL
SPECIFIC GRAVITY, POC UA: 1.01
T VAGINALIS RRNA GENITAL QL PROBE: NEGATIVE
UROBILINOGEN, POC UA: NORMAL

## 2017-10-30 PROCEDURE — 99213 OFFICE O/P EST LOW 20 MIN: CPT | Mod: 25,S$GLB,, | Performed by: NURSE PRACTITIONER

## 2017-10-30 PROCEDURE — 81002 URINALYSIS NONAUTO W/O SCOPE: CPT | Mod: S$GLB,,, | Performed by: NURSE PRACTITIONER

## 2017-10-30 PROCEDURE — 87480 CANDIDA DNA DIR PROBE: CPT

## 2017-10-30 PROCEDURE — 99999 PR PBB SHADOW E&M-EST. PATIENT-LVL III: CPT | Mod: PBBFAC,,, | Performed by: NURSE PRACTITIONER

## 2017-10-30 PROCEDURE — 87660 TRICHOMONAS VAGIN DIR PROBE: CPT

## 2017-10-30 PROCEDURE — 87086 URINE CULTURE/COLONY COUNT: CPT

## 2017-10-30 RX ORDER — PROGESTERONE 100 MG/1
CAPSULE ORAL
Refills: 0 | OUTPATIENT
Start: 2017-10-30

## 2017-10-30 RX ORDER — NITROFURANTOIN 25; 75 MG/1; MG/1
100 CAPSULE ORAL 2 TIMES DAILY
Qty: 14 CAPSULE | Refills: 0 | Status: SHIPPED | OUTPATIENT
Start: 2017-10-30 | End: 2017-11-06

## 2017-10-30 NOTE — TELEPHONE ENCOUNTER
Chris pt, thinks she has bladder infection. Pt having painful urination with little urine output.

## 2017-10-30 NOTE — PROGRESS NOTES
Chief Complaint   Patient presents with    Dysuria       (Dr. Perez patient)    Odilia Winslow is a 57 y.o. female  who complains of dysuria, frequency, hematuria and urgency x4 days.  Patient's last menstrual period was 2016..  She does complain of vaginal discharge and subtle malodor.  She is sexually active.  She uses no method.  She denies vaginal lesions, changes in sex partners, soaps, detergents, or douching. No known h/o recurrent UTIs.      Associated Symptoms:  Denies fever, chills, flank pain, n/v.    Past Medical History:   Diagnosis Date    Anxiety     Esophageal stricture     GERD (gastroesophageal reflux disease)     Hyperlipidemia     Hypertension     Insomnia     Irritable bowel syndrome     Postmenopausal bleeding      Past Surgical History:   Procedure Laterality Date    DILATION AND CURETTAGE OF UTERUS  ,     ECTOPIC PREGNANCY SURGERY  1986    removal of portion of tube    gerd      KNEE SURGERY Right 2015    arthroscopy    TONSILLECTOMY       Social History   Substance Use Topics    Smoking status: Never Smoker    Smokeless tobacco: Never Used    Alcohol use Yes      Comment: occasional     Family History   Problem Relation Age of Onset    Heart disease Mother      cad s/p stent    Hypertension Mother     Cancer Mother      MDS    Diabetes Father     Cancer Father      lung, + tobacco    Breast cancer Maternal Aunt     No Known Problems Sister     Cancer Brother      lung, + tobacco    Thyroid disease Daughter     Bipolar disorder Son     No Known Problems Sister     No Known Problems Brother     No Known Problems Son     Colon cancer Neg Hx     Ovarian cancer Neg Hx      OB History    Para Term  AB Living   6 5 4   1 3   SAB TAB Ectopic Multiple Live Births       1   3      # Outcome Date GA Lbr Carlos/2nd Weight Sex Delivery Anes PTL Lv   6 Term            5 Term            4 Ectopic            3 Para      Vag-Spont  N RACHEL    2 Term      Vag-Spont  N RACHEL   1 Term      Vag-Spont  N RACHEL      Obstetric Comments   Menopause age 46         Vitals:    10/30/17 1543   BP: (!) 140/82     Body mass index is 28.02 kg/m².    ROS:  GENERAL:  No fever, chills, fatigability or weight loss.  VULVAR:  No pain, no lesions and no itching.  VAGINAL:  No relaxation, no itching, no discharge, no abnormal bleeding; no lesions.         ABDOMEN:  No abdominal pain. No nausea/vomiting. No diarrhea/constipation.  denies flank pain.         BREAST:  Denies pain. No lumps. No discharge.  URINARY:  No incontinence, no nocturia, reports frequency; reports dysuria; reports hematuria.  CARDIOVASCULAR:  No chest pain. No shortness of breath. No leg cramps.  NEUROLOGICAL:  No headaches. No vision changes.    PHYSICAL EXAM:  GEN:  Appears well, in no apparent distress.  Well developed; well nourished.   soft, non-tender; bowel sounds normal  EXTERNAL GENITALIA: normal appearing vulva with no masses, tenderness or lesions  VAGINA: small amount milky white discharge   CERVIX: no lesions or cervical motion tenderness  UTERUS: small, nontender  ADNEXA: no masses palpable and nontender    Urine dipstick shows:   positive for RBC's, positive for protein and positive for leukocytes.        ASSESSMENT/PLAN:  Dysuria  -     Urine culture  -     POCT urine dipstick without microscope  -     nitrofurantoin, macrocrystal-monohydrate, (MACROBID) 100 MG capsule; Take 1 capsule (100 mg total) by mouth 2 (two) times daily.  Dispense: 14 capsule; Refill: 0  -     methen-m.blue-s.phos-phsal-hyo 118-10-40.8-36 mg Cap; Take 1 capsule by mouth every 6 (six) hours as needed.  Dispense: 10 capsule; Refill: 3  -     Vaginosis Screen by DNA Probe    Abnormal finding in urine  -     nitrofurantoin, macrocrystal-monohydrate, (MACROBID) 100 MG capsule; Take 1 capsule (100 mg total) by mouth 2 (two) times daily.  Dispense: 14 capsule; Refill: 0    Hematuria, unspecified type  -     nitrofurantoin,  macrocrystal-monohydrate, (MACROBID) 100 MG capsule; Take 1 capsule (100 mg total) by mouth 2 (two) times daily.  Dispense: 14 capsule; Refill: 0    Vaginal discharge        Return if symptoms worsen or fail to improve.    EDUCATION:    Lifestyle changes to treat and prevent UTIs  The lifestyle changes below will help get rid of your UTI. They may also help prevent future UTIs.  · Drink plenty of fluids. This includes water, juice, or other caffeine-free drinks. Fluids help flush bacteria out of your body.  · Empty your bladder. Always empty your bladder when you feel the urge to urinate. And always urinate before going to sleep. Urine that stays in your bladder can lead to infection. Try to urinate before and after sex as well.  · Practice good personal hygiene. Wipe yourself from front to back after using the toilet. This helps keep bacteria from getting into the urethra.  · Use condoms during sex. These help prevent UTIs caused by sexually transmitted bacteria. Also, avoid using spermicides during sex. These can increase the risk of UTIs. Choose other forms of birth control instead. For women who tend to get UTIs after sex, a low-dose of a preventive antibiotic may be used. Be sure to discuss this option with your health care provider.     ·     Follow up with your health care provider as directed. He or she may test to               make sure the infection has cleared. If necessary, additional treatment may be           started.

## 2017-10-31 ENCOUNTER — TELEPHONE (OUTPATIENT)
Dept: OBSTETRICS AND GYNECOLOGY | Facility: CLINIC | Age: 57
End: 2017-10-31

## 2017-10-31 LAB — BACTERIA UR CULT: NO GROWTH

## 2017-10-31 NOTE — TELEPHONE ENCOUNTER
----- Message from Vane Finn NP sent at 10/31/2017  2:29 PM CDT -----  Please call pt.....    Your vaginal swab from the office came back negative.  This was a test for a yeast infection or a bacterial infection.  Your swab was normal.  Your urine culture is still in-process.  Please call the office if you have any other questions.        Sincerely,   ZO Guzman

## 2017-10-31 NOTE — PROGRESS NOTES
Please call pt.....    Your vaginal swab from the office came back negative.  This was a test for a yeast infection or a bacterial infection.  Your swab was normal.  Your urine culture is still in-process.  Please call the office if you have any other questions.        Sincerely,   ZO Guzman

## 2017-11-01 ENCOUNTER — TELEPHONE (OUTPATIENT)
Dept: OBSTETRICS AND GYNECOLOGY | Facility: CLINIC | Age: 57
End: 2017-11-01

## 2017-11-01 NOTE — TELEPHONE ENCOUNTER
----- Message from Vane Finn NP sent at 11/1/2017 12:15 PM CDT -----  Call pt and tell her urine culture negative.  (If taking antibiotics, she can stop them)

## 2017-11-09 ENCOUNTER — OFFICE VISIT (OUTPATIENT)
Dept: SURGERY | Facility: CLINIC | Age: 57
End: 2017-11-09
Payer: COMMERCIAL

## 2017-11-09 VITALS
WEIGHT: 177.94 LBS | TEMPERATURE: 98 F | HEIGHT: 67 IN | SYSTOLIC BLOOD PRESSURE: 171 MMHG | HEART RATE: 73 BPM | BODY MASS INDEX: 27.93 KG/M2 | DIASTOLIC BLOOD PRESSURE: 77 MMHG

## 2017-11-09 DIAGNOSIS — E21.0 PRIMARY HYPERPARATHYROIDISM: ICD-10-CM

## 2017-11-09 DIAGNOSIS — Z01.818 PREOP TESTING: Primary | ICD-10-CM

## 2017-11-09 PROCEDURE — 99245 OFF/OP CONSLTJ NEW/EST HI 55: CPT | Mod: S$GLB,,, | Performed by: SURGERY

## 2017-11-09 PROCEDURE — 99999 PR PBB SHADOW E&M-EST. PATIENT-LVL III: CPT | Mod: PBBFAC,,, | Performed by: SURGERY

## 2017-11-09 NOTE — LETTER
November 12, 2017      Lisseth Art, NP  [address]  [place] [address]           [facility] General [facility]  [address]  [place] [address]  Phone: [phone]          Patient: Odilia Winslow   MR Number: [id number]   YOB: 1960   Date of Visit: 11/9/2017       Dear Lisseth Art:    Thank you for referring Odilia Winslow to me for evaluation. Attached you will find relevant portions of my assessment and plan of care.    If you have questions, please do not hesitate to call me. I look forward to following Odilia Winslow along with you.    Sincerely,    Rangel Loza MD    Enclosure  CC:  No Recipients    If you would like to receive this communication electronically, please contact [email or web address] or [phone] to request more information on [employer] access.    For providers and/or their staff who would like to refer a patient to [facility], please contact us through our one-stop-shop provider referral line, [facility], at [phone].    If you feel you have received this communication in error or would no longer like to receive these types of communications, please e-mail [email or web address]

## 2017-11-09 NOTE — PROGRESS NOTES
H&P  Endocrine/Breast Surgery    CC:   Chief Complaint   Patient presents with    Consult     HPI: Odilia Winslow is 57 y.o. female presenting to clinic today for evaluation of parathyroid adenoma and hyperparathyroidism.   Initial work up for hypercalcemia began in 07/2017. PTH elevated at 112.0 pg/mL in 09/2017, Corrected serum calcium = 11.9 mg/dL from 09/21/2017, vitamin D 13 . Work up included 24 hour urine studies for calcium/creatinine which was consistent with primary hyperparathyroidism, BMD shows osteopenia, thyroid ultrasound which was negative, NM parathyroid scan on 09/28/17 which showed paratyhroid adenoma of lower lobe      The patient denies use of HCTZ or lithium   She is not taking calcium supplements. Started on vitamin D by Endocrinologist   Does endorse some fatigue and anxiety, arthralgia   She denies constipation   Denies kidney stones or kidney disease. Does endorse recurrent UTI. Some hematuria in the last week. No UTI on UA.  Denies weakness   Denies nausea/vomiting.  No previous fractures.     SH: No smoking history. Social alcohol use.    FH: Lung cancer in father and brother (smokers).     PMH:   Past Medical History:   Diagnosis Date    Anxiety     Esophageal stricture     GERD (gastroesophageal reflux disease)     Hyperlipidemia     Hypertension     Insomnia     Irritable bowel syndrome     Postmenopausal bleeding    Carotid Stenosis     PSH:   Past Surgical History:   Procedure Laterality Date    DILATION AND CURETTAGE OF UTERUS  2007, 2013    ECTOPIC PREGNANCY SURGERY  1986    removal of portion of tube    gerd      KNEE SURGERY Right 2015    arthroscopy    TONSILLECTOMY         Allergies: Review of patient's allergies indicates:  No Known Allergies  Meds:   Patient's Medications   New Prescriptions    No medications on file   Previous Medications    AMLODIPINE (NORVASC) 5 MG TABLET    Take 1 tablet (5 mg total) by mouth once daily.    ATORVASTATIN (LIPITOR) 20 MG  TABLET    Take 1 tablet (20 mg total) by mouth once daily.    DEXILANT 60 MG CAPSULE        ESCITALOPRAM OXALATE (LEXAPRO) 20 MG TABLET    Take 1 tablet (20 mg total) by mouth once daily.    METHEN-M.BLUE-S.PHOS-PHSAL--10-40.8-36 MG CAP    Take 1 capsule by mouth every 6 (six) hours as needed.    PROGESTERONE (PROMETRIUM) 100 MG CAPSULE    Take 1 capsule (100 mg total) by mouth once daily.    VALACYCLOVIR (VALTREX) 1000 MG TABLET    TAKE (1/2) HALF BY MOUTH EVERY 12 HOURS    VALSARTAN (DIOVAN) 160 MG TABLET    Take 1 tablet (160 mg total) by mouth every morning.   Modified Medications    No medications on file   Discontinued Medications    No medications on file       Family History   Problem Relation Age of Onset    Heart disease Mother      cad s/p stent    Hypertension Mother     Cancer Mother      MDS    Diabetes Father     Cancer Father      lung, + tobacco    Breast cancer Maternal Aunt     No Known Problems Sister     Cancer Brother      lung, + tobacco    Thyroid disease Daughter     Bipolar disorder Son     No Known Problems Sister     No Known Problems Brother     No Known Problems Son     Colon cancer Neg Hx     Ovarian cancer Neg Hx        Review of Systems - For pertinent positives please see HPI   Constitutional: Negative for fever and chills.   HENT: Negative for congestion and ear pain.   Respiratory: Negative for cough and shortness of breath.   Cardiovascular: Negative for chest pain and palpitations.   Gastrointestinal: Negative for nausea, vomiting, abdominal pain, diarrhea, constipation and abdominal distention.   Genitourinary: +hematuria, dysuria, bladder spasms   Musculoskeletal: +Arthalgias   Skin: Negative for rash and wound.   Neurological: Negative for weakness and numbness.   Psychiatric/Behavioral: +anxiety     OBJECTIVE:     Vital Signs (Most Recent)  Temp: 98.4 °F (36.9 °C) (11/09/17 1335)  Pulse: 73 (11/09/17 1335)  BP: (!) 171/77 (11/09/17 1335)      Physical  Exam:  General: well developed, well nourished, no distress  Lungs:  clear to auscultation bilaterally and normal respiratory effort  Heart: regular rate and rhythm, S1, S2 normal, no murmur, rub or gallop  Abdomen: soft, non-tender non-distented; bowel sounds normal; no masses,  no organomegaly  Neuro: A&O x3, no focal deficits   Ext: wwp, no edema   Head: NC / AT. Fullness in neck. No thyroid nodules appreciated.   EOMI / no scleral icterus   Neck: supple, no tracheal deviation    Laboratory:  Creatinine, urine 31.5     Vitamin D 13  Calcium 11.2   Calcium ionized, 1.43  Calcium, urine 8.3   Creatinine, urine 28      DXA Bone Scan: 9/28/17  BONE MINERAL DENSITY RESULTS:  Lumbar Spine: Lumbar bone mineral density L1-L4 is 0.845g/cm2, which is a t-score of -1.8. The z-score is -0.5.  Total Hip: The total hip bone mineral density is 0.815g/cm2.  The t-score is -1.0, and the z-score is -0.1.  Femoral neck BMD is 0.632g/cm2 and the t-score is -2.0.  Lt Forearm: The Lt Forearm bone mineral density is 0.488g/cm2. The t-score is -1.7, and the z-score is -0.5.  COMPARISONS:  Date Location BMD T-score  08/30/11 L-spine 0.928 -1.1  Total Hip 0.921 -0.2    9/28/17: US soft tissue neck:  1) Thyroid gland is nor enlarged.   2) No nodules are seen in thyroid gland.  3) Parathyroid gland not appreciate in this study  RECOMMENDATIONS:   Consider sestamibi scan for localization of parathyroid adenoma.  9/28/17: NM Parathyroid Scan with SPECT   Findings: Patient was administered 21.0 mCi of technetium 99m labeled Myoview intravenously.  After just below the lower pole the left lobe of the thyroid gland there is delayed washout indicating a parathyroid adenoma.   Impression    Parathyroid adenoma at or just below the lower pole of the left lobe of the thyroid gland.         ASSESSMENT/PLAN:     58 yo female with left lower pole parathyroid adenoma   - will plan for left lower parathyroidectomy with possible 4 gland  exploration  - Appropriate patient education regarding the sukhwinder-operative period as well as intraperative details were discussed. Risks, including but not limited to, bleeding, infection, pain and anesthetic complication were discussed. Patient was given the opportunity to ask questions and to have those questions answered to their satisfaction. Patient verbalized understanding to both procedure and associated risks. Consent was obtained.    Zack Heck, PGY-3  General Surgery  [phone]    I have personally taken the history and examined this patient and agree with the resident's note as stated above.  Pt with PHPT  Sestamibi scan reviewed and suggests left lower PTH adenoma just inferior to the left inferior thyroid pole.  Ca ++ level elevated at 11.3, iPTH 112, urinary calcium levels rule out FHH.  Consented and scheduled for a left inferior lower parathyroidectomy of suspected left lower adenoma by sestamibi scan.  Surgery scheduled for 11-22-17.

## 2017-11-12 PROBLEM — Z01.818 PREOP TESTING: Status: ACTIVE | Noted: 2017-11-12

## 2017-11-13 ENCOUNTER — HOSPITAL ENCOUNTER (OUTPATIENT)
Dept: CARDIOLOGY | Facility: CLINIC | Age: 57
Discharge: HOME | End: 2017-11-13
Payer: COMMERCIAL

## 2017-11-13 DIAGNOSIS — E21.3 HYPERPARATHYROIDISM: Primary | ICD-10-CM

## 2017-11-13 DIAGNOSIS — Z01.818 PREOP TESTING: ICD-10-CM

## 2017-11-13 PROCEDURE — 93000 ELECTROCARDIOGRAM COMPLETE: CPT | Mod: S$GLB,,, | Performed by: INTERNAL MEDICINE

## 2017-11-20 ENCOUNTER — TELEPHONE (OUTPATIENT)
Dept: SURGERY | Facility: CLINIC | Age: 57
End: 2017-11-20

## 2017-11-20 NOTE — TELEPHONE ENCOUNTER
----- Message from Parag Post sent at 11/20/2017 11:29 AM CST -----  Patient states that (s)he needs to speak with nurse in ref to changing her sx date to 12/13//please call back at [phone]//thank you

## 2017-11-27 DIAGNOSIS — Z78.0 MENOPAUSE: ICD-10-CM

## 2017-11-27 RX ORDER — PROGESTERONE 100 MG/1
100 CAPSULE ORAL DAILY
Qty: 30 CAPSULE | Refills: 0 | Status: SHIPPED | OUTPATIENT
Start: 2017-11-27 | End: 2018-09-10 | Stop reason: SDUPTHER

## 2017-12-12 ENCOUNTER — TELEPHONE (OUTPATIENT)
Dept: SURGERY | Facility: CLINIC | Age: 57
End: 2017-12-12

## 2017-12-12 RX ORDER — CHOLECALCIFEROL (VITAMIN D3) 25 MCG
2000 TABLET ORAL DAILY
COMMUNITY
End: 2019-01-21

## 2017-12-12 NOTE — PRE-PROCEDURE INSTRUCTIONS
Preop instructions: NPO after midnight or 8 hours prior to procedure time, shower instructions, directions, leave all valuables at home, medication instructions for PM prior & am of procedure explained. Patient stated an understanding.  Patient denies any side effects or issues with anesthesia or sedation.  Patient does not know arrival time. Explained that this information comes from the surgeons office and if they have not heard from them by 3pm to call office. Patient stated an understanding.

## 2017-12-13 ENCOUNTER — SURGERY (OUTPATIENT)
Age: 57
End: 2017-12-13

## 2017-12-13 ENCOUNTER — ANESTHESIA (OUTPATIENT)
Dept: SURGERY | Facility: HOSPITAL | Age: 57
End: 2017-12-13
Payer: COMMERCIAL

## 2017-12-13 ENCOUNTER — HOSPITAL ENCOUNTER (OUTPATIENT)
Facility: HOSPITAL | Age: 57
Discharge: HOME | End: 2017-12-13
Attending: SURGERY | Admitting: SURGERY
Payer: COMMERCIAL

## 2017-12-13 ENCOUNTER — ANESTHESIA EVENT (OUTPATIENT)
Dept: SURGERY | Facility: HOSPITAL | Age: 57
End: 2017-12-13
Payer: COMMERCIAL

## 2017-12-13 VITALS
RESPIRATION RATE: 16 BRPM | DIASTOLIC BLOOD PRESSURE: 69 MMHG | HEART RATE: 87 BPM | SYSTOLIC BLOOD PRESSURE: 130 MMHG | WEIGHT: 178 LBS | TEMPERATURE: 99 F | BODY MASS INDEX: 27.94 KG/M2 | HEIGHT: 67 IN | OXYGEN SATURATION: 97 %

## 2017-12-13 DIAGNOSIS — E21.3 HYPERPARATHYROIDISM: ICD-10-CM

## 2017-12-13 LAB
PTH-INTACT SERPL-MCNC: 110 PG/ML
PTH-INTACT SERPL-MCNC: 12 PG/ML
PTH-INTACT SERPL-MCNC: 62 PG/ML
PTH-INTACT SERPL-MCNC: 69 PG/ML

## 2017-12-13 PROCEDURE — 88305 TISSUE EXAM BY PATHOLOGIST: CPT | Performed by: PATHOLOGY

## 2017-12-13 PROCEDURE — 88305 TISSUE EXAM BY PATHOLOGIST: CPT | Mod: 26,,, | Performed by: PATHOLOGY

## 2017-12-13 PROCEDURE — 88331 PATH CONSLTJ SURG 1 BLK 1SPC: CPT | Mod: 26,,, | Performed by: PATHOLOGY

## 2017-12-13 PROCEDURE — 71000033 HC RECOVERY, INTIAL HOUR: Performed by: SURGERY

## 2017-12-13 PROCEDURE — D9220A PRA ANESTHESIA: Mod: ANES,,, | Performed by: ANESTHESIOLOGY

## 2017-12-13 PROCEDURE — D9220A PRA ANESTHESIA: Mod: CRNA,,, | Performed by: NURSE ANESTHETIST, CERTIFIED REGISTERED

## 2017-12-13 PROCEDURE — 25000003 PHARM REV CODE 250: Performed by: STUDENT IN AN ORGANIZED HEALTH CARE EDUCATION/TRAINING PROGRAM

## 2017-12-13 PROCEDURE — S0020 INJECTION, BUPIVICAINE HYDRO: HCPCS | Performed by: STUDENT IN AN ORGANIZED HEALTH CARE EDUCATION/TRAINING PROGRAM

## 2017-12-13 PROCEDURE — 60500 EXPLORE PARATHYROID GLANDS: CPT | Mod: ,,, | Performed by: SURGERY

## 2017-12-13 PROCEDURE — 63600175 PHARM REV CODE 636 W HCPCS: Performed by: STUDENT IN AN ORGANIZED HEALTH CARE EDUCATION/TRAINING PROGRAM

## 2017-12-13 PROCEDURE — 83970 ASSAY OF PARATHORMONE: CPT

## 2017-12-13 PROCEDURE — 37000009 HC ANESTHESIA EA ADD 15 MINS: Performed by: SURGERY

## 2017-12-13 PROCEDURE — 36000706: Performed by: SURGERY

## 2017-12-13 PROCEDURE — 71000015 HC POSTOP RECOV 1ST HR: Performed by: SURGERY

## 2017-12-13 PROCEDURE — 25000003 PHARM REV CODE 250: Performed by: NURSE ANESTHETIST, CERTIFIED REGISTERED

## 2017-12-13 PROCEDURE — 37000008 HC ANESTHESIA 1ST 15 MINUTES: Performed by: SURGERY

## 2017-12-13 PROCEDURE — 63600175 PHARM REV CODE 636 W HCPCS: Performed by: NURSE ANESTHETIST, CERTIFIED REGISTERED

## 2017-12-13 PROCEDURE — 27201423 OPTIME MED/SURG SUP & DEVICES STERILE SUPPLY: Performed by: SURGERY

## 2017-12-13 PROCEDURE — 36000707: Performed by: SURGERY

## 2017-12-13 PROCEDURE — 71000039 HC RECOVERY, EACH ADD'L HOUR: Performed by: SURGERY

## 2017-12-13 PROCEDURE — 88331 PATH CONSLTJ SURG 1 BLK 1SPC: CPT | Performed by: PATHOLOGY

## 2017-12-13 PROCEDURE — 94761 N-INVAS EAR/PLS OXIMETRY MLT: CPT

## 2017-12-13 RX ORDER — LIDOCAINE HCL/PF 100 MG/5ML
SYRINGE (ML) INTRAVENOUS
Status: DISCONTINUED | OUTPATIENT
Start: 2017-12-13 | End: 2017-12-13

## 2017-12-13 RX ORDER — SODIUM CHLORIDE 0.9 % (FLUSH) 0.9 %
3 SYRINGE (ML) INJECTION
Status: DISCONTINUED | OUTPATIENT
Start: 2017-12-13 | End: 2017-12-13 | Stop reason: HOSPADM

## 2017-12-13 RX ORDER — FENTANYL CITRATE 50 UG/ML
25 INJECTION, SOLUTION INTRAMUSCULAR; INTRAVENOUS EVERY 5 MIN PRN
Status: DISCONTINUED | OUTPATIENT
Start: 2017-12-13 | End: 2017-12-13 | Stop reason: HOSPADM

## 2017-12-13 RX ORDER — OXYCODONE AND ACETAMINOPHEN 10; 325 MG/1; MG/1
TABLET ORAL
Status: DISCONTINUED
Start: 2017-12-13 | End: 2017-12-13 | Stop reason: HOSPADM

## 2017-12-13 RX ORDER — OXYCODONE AND ACETAMINOPHEN 10; 325 MG/1; MG/1
1 TABLET ORAL EVERY 4 HOURS PRN
Status: DISCONTINUED | OUTPATIENT
Start: 2017-12-13 | End: 2017-12-13 | Stop reason: HOSPADM

## 2017-12-13 RX ORDER — CALCIUM CARBONATE 500(1250)
2 TABLET ORAL 2 TIMES DAILY
Refills: 0 | COMMUNITY
Start: 2017-12-13 | End: 2018-07-25 | Stop reason: ALTCHOICE

## 2017-12-13 RX ORDER — DOCUSATE SODIUM 100 MG/1
100 CAPSULE, LIQUID FILLED ORAL 2 TIMES DAILY
Refills: 0 | COMMUNITY
Start: 2017-12-13 | End: 2019-07-16

## 2017-12-13 RX ORDER — SODIUM CHLORIDE 9 MG/ML
INJECTION, SOLUTION INTRAVENOUS CONTINUOUS
Status: DISCONTINUED | OUTPATIENT
Start: 2017-12-13 | End: 2017-12-13 | Stop reason: HOSPADM

## 2017-12-13 RX ORDER — DEXAMETHASONE SODIUM PHOSPHATE 4 MG/ML
INJECTION, SOLUTION INTRA-ARTICULAR; INTRALESIONAL; INTRAMUSCULAR; INTRAVENOUS; SOFT TISSUE
Status: DISCONTINUED | OUTPATIENT
Start: 2017-12-13 | End: 2017-12-13

## 2017-12-13 RX ORDER — FENTANYL CITRATE 50 UG/ML
INJECTION, SOLUTION INTRAMUSCULAR; INTRAVENOUS
Status: DISCONTINUED | OUTPATIENT
Start: 2017-12-13 | End: 2017-12-13

## 2017-12-13 RX ORDER — GLYCOPYRROLATE 0.2 MG/ML
INJECTION INTRAMUSCULAR; INTRAVENOUS
Status: DISCONTINUED | OUTPATIENT
Start: 2017-12-13 | End: 2017-12-13

## 2017-12-13 RX ORDER — NEOSTIGMINE METHYLSULFATE 1 MG/ML
INJECTION, SOLUTION INTRAVENOUS
Status: DISCONTINUED | OUTPATIENT
Start: 2017-12-13 | End: 2017-12-13

## 2017-12-13 RX ORDER — ONDANSETRON 2 MG/ML
INJECTION INTRAMUSCULAR; INTRAVENOUS
Status: DISCONTINUED | OUTPATIENT
Start: 2017-12-13 | End: 2017-12-13

## 2017-12-13 RX ORDER — ACETAMINOPHEN 10 MG/ML
INJECTION, SOLUTION INTRAVENOUS
Status: DISCONTINUED | OUTPATIENT
Start: 2017-12-13 | End: 2017-12-13

## 2017-12-13 RX ORDER — PROPOFOL 10 MG/ML
VIAL (ML) INTRAVENOUS
Status: DISCONTINUED | OUTPATIENT
Start: 2017-12-13 | End: 2017-12-13

## 2017-12-13 RX ORDER — ROCURONIUM BROMIDE 10 MG/ML
INJECTION, SOLUTION INTRAVENOUS
Status: DISCONTINUED | OUTPATIENT
Start: 2017-12-13 | End: 2017-12-13

## 2017-12-13 RX ORDER — OXYCODONE AND ACETAMINOPHEN 5; 325 MG/1; MG/1
1-2 TABLET ORAL EVERY 4 HOURS PRN
Qty: 31 TABLET | Refills: 0 | Status: SHIPPED | OUTPATIENT
Start: 2017-12-13 | End: 2018-07-25

## 2017-12-13 RX ORDER — LIDOCAINE HYDROCHLORIDE 10 MG/ML
1 INJECTION, SOLUTION EPIDURAL; INFILTRATION; INTRACAUDAL; PERINEURAL ONCE
Status: COMPLETED | OUTPATIENT
Start: 2017-12-13 | End: 2017-12-13

## 2017-12-13 RX ORDER — BUPIVACAINE HYDROCHLORIDE 5 MG/ML
INJECTION, SOLUTION EPIDURAL; INTRACAUDAL
Status: DISCONTINUED | OUTPATIENT
Start: 2017-12-13 | End: 2017-12-13 | Stop reason: HOSPADM

## 2017-12-13 RX ORDER — MIDAZOLAM HYDROCHLORIDE 1 MG/ML
INJECTION, SOLUTION INTRAMUSCULAR; INTRAVENOUS
Status: DISCONTINUED | OUTPATIENT
Start: 2017-12-13 | End: 2017-12-13

## 2017-12-13 RX ADMIN — FENTANYL CITRATE 50 MCG: 50 INJECTION, SOLUTION INTRAMUSCULAR; INTRAVENOUS at 11:12

## 2017-12-13 RX ADMIN — BUPIVACAINE HYDROCHLORIDE 13 ML: 5 INJECTION, SOLUTION EPIDURAL; INTRACAUDAL; PERINEURAL at 09:12

## 2017-12-13 RX ADMIN — PROPOFOL 150 MG: 10 INJECTION, EMULSION INTRAVENOUS at 08:12

## 2017-12-13 RX ADMIN — OXYCODONE HYDROCHLORIDE AND ACETAMINOPHEN 1 TABLET: 10; 325 TABLET ORAL at 11:12

## 2017-12-13 RX ADMIN — SODIUM CHLORIDE, SODIUM GLUCONATE, SODIUM ACETATE, POTASSIUM CHLORIDE, MAGNESIUM CHLORIDE, SODIUM PHOSPHATE, DIBASIC, AND POTASSIUM PHOSPHATE: .53; .5; .37; .037; .03; .012; .00082 INJECTION, SOLUTION INTRAVENOUS at 09:12

## 2017-12-13 RX ADMIN — Medication 2 G: at 08:12

## 2017-12-13 RX ADMIN — LIDOCAINE HYDROCHLORIDE 100 MG: 20 INJECTION, SOLUTION INTRAVENOUS at 08:12

## 2017-12-13 RX ADMIN — ACETAMINOPHEN 1000 MG: 10 INJECTION, SOLUTION INTRAVENOUS at 08:12

## 2017-12-13 RX ADMIN — NEOSTIGMINE METHYLSULFATE 3 MG: 1 INJECTION INTRAVENOUS at 11:12

## 2017-12-13 RX ADMIN — SODIUM CHLORIDE: 0.9 INJECTION, SOLUTION INTRAVENOUS at 07:12

## 2017-12-13 RX ADMIN — ONDANSETRON 4 MG: 2 INJECTION INTRAMUSCULAR; INTRAVENOUS at 08:12

## 2017-12-13 RX ADMIN — LIDOCAINE HYDROCHLORIDE 10 MG: 10 INJECTION, SOLUTION EPIDURAL; INFILTRATION; INTRACAUDAL; PERINEURAL at 07:12

## 2017-12-13 RX ADMIN — PHENYLEPHRINE HYDROCHLORIDE 50 MCG: 10 INJECTION INTRAVENOUS at 09:12

## 2017-12-13 RX ADMIN — DEXAMETHASONE SODIUM PHOSPHATE 4 MG: 4 INJECTION, SOLUTION INTRAMUSCULAR; INTRAVENOUS at 09:12

## 2017-12-13 RX ADMIN — ROCURONIUM BROMIDE 30 MG: 10 INJECTION, SOLUTION INTRAVENOUS at 08:12

## 2017-12-13 RX ADMIN — FENTANYL CITRATE 50 MCG: 50 INJECTION, SOLUTION INTRAMUSCULAR; INTRAVENOUS at 08:12

## 2017-12-13 RX ADMIN — MIDAZOLAM HYDROCHLORIDE 2 MG: 1 INJECTION, SOLUTION INTRAMUSCULAR; INTRAVENOUS at 08:12

## 2017-12-13 RX ADMIN — GLYCOPYRROLATE 0.4 MG: 0.2 INJECTION, SOLUTION INTRAMUSCULAR; INTRAVENOUS at 11:12

## 2017-12-13 NOTE — PROGRESS NOTES
CXR read at bedside per Dr. Fitch. OK to be moved to phase II for discharge. Will move to phase II once released.

## 2017-12-13 NOTE — TRANSFER OF CARE
"Anesthesia Transfer of Care Note    Patient: Odilia Winslow    Procedure(s) Performed: Procedure(s) (LRB):  PARATHYROIDECTOMY  LEFT LOWER  WITH INTRA-OP PTH LEVEL  (Left)    Patient location: PACU    Anesthesia Type: general    Transport from OR: Transported from OR on 6-10 L/min O2 by face mask with adequate spontaneous ventilation    Post pain: adequate analgesia    Post assessment: no apparent anesthetic complications and tolerated procedure well    Post vital signs: stable    Level of consciousness: awake, alert and oriented    Nausea/Vomiting: no nausea/vomiting    Complications: none    Transfer of care protocol was followed      Last vitals:   Visit Vitals  /61 (BP Location: Right arm, Patient Position: Lying)   Pulse 104   Temp 37.7 °C (99.8 °F) (Axillary)   Resp 17   Ht 5' 7" (1.702 m)   Wt 80.7 kg (178 lb)   LMP 07/26/2016   SpO2 96%   Breastfeeding? No   BMI 27.88 kg/m²     "

## 2017-12-13 NOTE — H&P
CC:       Chief Complaint   Patient presents with    Consult      HPI: Odilia Winslow is 57 y.o. female presenting to clinic today for evaluation of parathyroid adenoma and hyperparathyroidism.   Initial work up for hypercalcemia began in 07/2017. PTH elevated at 112.0 pg/mL in 09/2017, Corrected serum calcium = 11.9 mg/dL from 09/21/2017, vitamin D 13 . Work up included 24 hour urine studies for calcium/creatinine which was consistent with primary hyperparathyroidism, BMD shows osteopenia, thyroid ultrasound which was negative, NM parathyroid scan on 09/28/17 which showed paratyhroid adenoma of lower lobe      The patient denies use of HCTZ or lithium   She is not taking calcium supplements. Started on vitamin D by Endocrinologist   Does endorse some fatigue and anxiety, arthralgia   She denies constipation   Denies kidney stones or kidney disease. Does endorse recurrent UTI. Some hematuria in the last week. No UTI on UA.  Denies weakness   Denies nausea/vomiting.  No previous fractures.      SH: No smoking history. Social alcohol use.     FH: Lung cancer in father and brother (smokers).    PMH:        Past Medical History:   Diagnosis Date    Anxiety      Esophageal stricture      GERD (gastroesophageal reflux disease)      Hyperlipidemia      Hypertension      Insomnia      Irritable bowel syndrome      Postmenopausal bleeding     Carotid Stenosis      PSH:         Past Surgical History:   Procedure Laterality Date    DILATION AND CURETTAGE OF UTERUS   2007, 2013    ECTOPIC PREGNANCY SURGERY   1986     removal of portion of tube    gerd        KNEE SURGERY Right 2015     arthroscopy    TONSILLECTOMY             Allergies: Review of patient's allergies indicates:  No Known Allergies  Meds:        Patient's Medications   New Prescriptions     No medications on file   Previous Medications     AMLODIPINE (NORVASC) 5 MG TABLET    Take 1 tablet (5 mg total) by mouth once daily.     ATORVASTATIN  (LIPITOR) 20 MG TABLET    Take 1 tablet (20 mg total) by mouth once daily.     DEXILANT 60 MG CAPSULE         ESCITALOPRAM OXALATE (LEXAPRO) 20 MG TABLET    Take 1 tablet (20 mg total) by mouth once daily.     METHEN-M.BLUE-S.PHOS-PHSAL--10-40.8-36 MG CAP    Take 1 capsule by mouth every 6 (six) hours as needed.     PROGESTERONE (PROMETRIUM) 100 MG CAPSULE    Take 1 capsule (100 mg total) by mouth once daily.     VALACYCLOVIR (VALTREX) 1000 MG TABLET    TAKE (1/2) HALF BY MOUTH EVERY 12 HOURS     VALSARTAN (DIOVAN) 160 MG TABLET    Take 1 tablet (160 mg total) by mouth every morning.   Modified Medications     No medications on file   Discontinued Medications     No medications on file                Family History   Problem Relation Age of Onset    Heart disease Mother         cad s/p stent    Hypertension Mother      Cancer Mother         MDS    Diabetes Father      Cancer Father         lung, + tobacco    Breast cancer Maternal Aunt      No Known Problems Sister      Cancer Brother         lung, + tobacco    Thyroid disease Daughter      Bipolar disorder Son      No Known Problems Sister      No Known Problems Brother      No Known Problems Son      Colon cancer Neg Hx      Ovarian cancer Neg Hx           Review of Systems - For pertinent positives please see HPI   Constitutional: Negative for fever and chills.   HENT: Negative for congestion and ear pain.   Respiratory: Negative for cough and shortness of breath.   Cardiovascular: Negative for chest pain and palpitations.   Gastrointestinal: Negative for nausea, vomiting, abdominal pain, diarrhea, constipation and abdominal distention.   Genitourinary: +hematuria, dysuria, bladder spasms   Musculoskeletal: +Arthalgias   Skin: Negative for rash and wound.   Neurological: Negative for weakness and numbness.   Psychiatric/Behavioral: +anxiety      OBJECTIVE:      Vital Signs (Most Recent)  Temp: 98.4 °F (36.9 °C) (11/09/17 1335)  Pulse: 73  (11/09/17 1335)  BP: (!) 171/77 (11/09/17 1335)        Physical Exam:  General: well developed, well nourished, no distress  Lungs:  clear to auscultation bilaterally and normal respiratory effort  Heart: regular rate and rhythm, S1, S2 normal, no murmur, rub or gallop  Abdomen: soft, non-tender non-distented; bowel sounds normal; no masses,  no organomegaly  Neuro: A&O x3, no focal deficits   Ext: wwp, no edema   Head: NC / AT. Fullness in neck. No thyroid nodules appreciated.   EOMI / no scleral icterus   Neck: supple, no tracheal deviation     Laboratory:  Creatinine, urine 31.5     Vitamin D 13  Calcium 11.2   Calcium ionized, 1.43  Calcium, urine 8.3   Creatinine, urine 28        DXA Bone Scan: 9/28/17  BONE MINERAL DENSITY RESULTS:  Lumbar Spine: Lumbar bone mineral density L1-L4 is 0.845g/cm2, which is a t-score of -1.8. The z-score is -0.5.  Total Hip: The total hip bone mineral density is 0.815g/cm2.  The t-score is -1.0, and the z-score is -0.1.  Femoral neck BMD is 0.632g/cm2 and the t-score is -2.0.  Lt Forearm: The Lt Forearm bone mineral density is 0.488g/cm2. The t-score is -1.7, and the z-score is -0.5.  COMPARISONS:  Date Location BMD T-score  08/30/11 L-spine 0.928 -1.1  Total Hip 0.921 -0.2     9/28/17: US soft tissue neck:  1) Thyroid gland is nor enlarged.   2) No nodules are seen in thyroid gland.  3) Parathyroid gland not appreciate in this study  RECOMMENDATIONS:   Consider sestamibi scan for localization of parathyroid adenoma.  9/28/17: NM Parathyroid Scan with SPECT   Findings: Patient was administered 21.0 mCi of technetium 99m labeled Myoview intravenously.  After just below the lower pole the left lobe of the thyroid gland there is delayed washout indicating a parathyroid adenoma.   Impression    Parathyroid adenoma at or just below the lower pole of the left lobe of the thyroid gland.            ASSESSMENT/PLAN:      56 yo female with left lower pole parathyroid adenoma   - will  plan for left lower parathyroidectomy with possible 4 gland exploration  - Appropriate patient education regarding the sukhwinder-operative period as well as intraperative details were discussed. Risks, including but not limited to, bleeding, infection, pain and anesthetic complication were discussed. Patient was given the opportunity to ask questions and to have those questions answered to their satisfaction. Patient verbalized understanding to both procedure and associated risks. Consent was obtained.   Zack Heck, PGY-3  General Surgery  [phone]     I have personally taken the history and examined this patient and agree with the resident's note as stated above.  Pt with PHPT  Sestamibi scan reviewed and suggests left lower PTH adenoma just inferior to the left inferior thyroid pole.  Ca ++ level elevated at 11.3, iPTH 112, urinary calcium levels rule out FHH.  Consented and scheduled for a left inferior lower parathyroidectomy of suspected left lower adenoma by sestamibi scan.  Surgery scheduled for 11-22-17.    No acute interval changes.

## 2017-12-13 NOTE — ANESTHESIA PREPROCEDURE EVALUATION
12/13/2017  Odilia Winslow is a 57 y.o., female.    Pre-op Assessment    I have reviewed the Patient Summary Reports.     I have reviewed the Nursing Notes.   I have reviewed the Medications.     Review of Systems  Anesthesia Hx:  No problems with previous Anesthesia  History of prior surgery of interest to airway management or planning: Denies Family Hx of Anesthesia complications.   Denies Personal Hx of Anesthesia complications.   Social:  Non-Smoker    Hematology/Oncology:  Hematology Normal   Oncology Normal     Cardiovascular:   Exercise tolerance: good Hypertension, well controlled hyperlipidemia ECG has been reviewed.    Pulmonary:  Pulmonary Normal    Renal/:  Renal/ Normal     Hepatic/GI:   GERD, well controlled    Musculoskeletal:  Musculoskeletal Normal    Neurological:  Neurology Normal    Endocrine:  Endocrine Normal        Physical Exam  General:  Well nourished    Airway/Jaw/Neck:  Airway Findings: Mouth Opening: Normal Tongue: Normal  General Airway Assessment: Adult  Mallampati: I  TM Distance: Normal, at least 6 cm         Dental:  Dental Findings: In tact, molar caps   Chest/Lungs:  Chest/Lungs Clear    Heart/Vascular:  Heart Findings: Normal Heart murmur: negative Vascular Findings: Normal    Abdomen:  Abdomen Findings: Normal    Musculoskeletal:  Musculoskeletal Findings: Normal   Skin:  Skin Findings: Normal         Anesthesia Plan  Type of Anesthesia, risks & benefits discussed:  Anesthesia Type:  general  Patient's Preference:   Intra-op Monitoring Plan: standard ASA monitors  Intra-op Monitoring Plan Comments:   Post Op Pain Control Plan: per primary service following discharge from PACU and IV/PO Opioids PRN  Post Op Pain Control Plan Comments:   Induction:   IV  Beta Blocker:  Patient is not currently on a Beta-Blocker (No further documentation required).       Informed  Consent: Patient understands risks and agrees with Anesthesia plan.  Questions answered. Anesthesia consent signed with patient.  ASA Score: 2     Day of Surgery Review of History & Physical:    H&P update referred to the surgeon.         Ready For Surgery From Anesthesia Perspective.

## 2017-12-13 NOTE — INTERVAL H&P NOTE
The patient has been examined and the H&P has been reviewed:    No acute interval changes.    Anesthesia/Surgery risks, benefits and alternative options discussed and understood by patient/family.          Active Hospital Problems    Diagnosis  POA    Hyperparathyroidism [E21.3]  Yes      Resolved Hospital Problems    Diagnosis Date Resolved POA   No resolved problems to display.

## 2017-12-13 NOTE — PROGRESS NOTES
Pt stable.  Pt states she has a small amount of discomfort at a 2. Pain is tolerable.  Pt is tolerating po liquids.  Discharge instructions given to pt and spouse.  Prescription given to spouse.  Pt ready for discharge.

## 2017-12-13 NOTE — ANESTHESIA POSTPROCEDURE EVALUATION
"Anesthesia Post Evaluation    Patient: Odilia Winslow    Procedure(s) Performed: Procedure(s) (LRB):  PARATHYROIDECTOMY  LEFT LOWER  WITH INTRA-OP PTH LEVEL  (Left)    Final Anesthesia Type: general  Patient location during evaluation: PACU  Patient participation: Yes- Able to Participate  Level of consciousness: awake and alert and oriented  Post-procedure vital signs: reviewed and stable  Pain management: adequate  Airway patency: patent  PONV status at discharge: No PONV  Anesthetic complications: no      Cardiovascular status: blood pressure returned to baseline  Respiratory status: unassisted and room air  Hydration status: euvolemic  Follow-up not needed.        Visit Vitals  /60   Pulse 81   Temp 37.7 °C (99.8 °F) (Axillary)   Resp 12   Ht 5' 7" (1.702 m)   Wt 80.7 kg (178 lb)   LMP 07/26/2016   SpO2 (!) 90%   Breastfeeding? No   BMI 27.88 kg/m²       Pain/Leda Score: Pain Assessment Performed: Yes (12/13/2017 11:25 AM)  Presence of Pain: denies (12/13/2017 11:25 AM)  Pain Rating Prior to Med Admin: 4 (12/13/2017 11:23 AM)  Leda Score: 8 (12/13/2017 11:25 AM)      "

## 2017-12-13 NOTE — DISCHARGE INSTRUCTIONS
Incision Care  Remember: Follow-up visits allow your healthcare provider to make sure your incision is healing well. Be sure to keep your appointments.     Stitches (sutures), surgical staples, special strips of surgical tape, or surgical skin glue may be used to close incisions. They also help stop bleeding and speed healing. To help your incision heal, follow the tips on this handout.  Home care  Tips for home care include the following:  · Always wash your hands before touching your incision.  · Keep your incision clean and dry.  · Avoid doing things that could cause dirt or sweat to get on your incision.  · Dont pick at scabs. They help protect the wound.  · Keep your incision out of water.  · Take a sponge bath to avoid getting your incision wet, unless your healthcare provider tells you otherwise.  · Ask your provider when can you take a shower or bathe.  · Ask your provider about the best way to keep your incision dry when bathing or showering.  · Pat stitches dry if they get wet. Dont rub.  · Leave the bandage (dressing) in place until you are told to remove it or change it. Change it only as directed, using clean hands.  · After the first 12 hours, change your dressing every 24 hours, or as directed by your healthcare provider.  · Change your dressing if it gets wet or soiled.  Care for specific closures  Follow these guidelines unless your healthcare provider tells you otherwise:  · Stitches or staples. Once you no longer need to keep these dry, clean the wound daily. First remove the bandage using clean hands. Then wash the area gently with soap and warm water. Use a wet cotton swab to loosen and remove any blood or crust that forms. After cleaning, put a thin layer of antibiotic ointment on. Then put on a new bandage.  · Skin glue. Dont put liquid, ointment, or cream on your wound while the glue is in place. Avoid activities that cause heavy sweating. Protect the wound from sunlight. Do not scratch,  rub, or pick at the glue. Do not put tape directly over the glue. The glue should peel off within 5 to 10 days.  · Surgical tape. Keep the area dry. If it gets wet, blot the area dry with a clean towel. Surgical tape usually falls off within 7 to 10 days. If it has not fallen off after 10 days, contact your healthcare provider before taking it off yourself. If you are told to remove the tape, put mineral oil or petroleum jelly on a cotton ball. Gently rub the tape until it is removed.  Changing your dressing  Leave the dressing (bandage) in place until you are told to remove it or change it. Follow the instructions below unless told otherwise by your healthcare provider:  · Always wash your hands before changing your dressing.  · After the first 48 hours, the incision wound usually will have closed. At this point, leave the incision uncovered and open to the air. If the incision has not closed keep it covered.  · Cover your incision only if your clothing is rubbing it or causing irritation.  · Change your dressing if it gets wet or soiled.  Follow-up care  Follow up with your healthcare provider to ask how long sutures or staples should be left in place. Be sure to return for stitch or staple removal as directed. If dissolving stitches were used in your mouth, these will not need to be removed. They should fall out or dissolve on their own.  If tape closures were used, remove them yourself when your provider recommends if they have not fallen off on their own. If skin glue was used, the glue will wear off by itself.  When to seek medical care  Call your healthcare provider if you have any of the following:  · More pain, redness, swelling, bleeding, or foul-smelling discharge around the incision area  · Fever of 100.4°F (38ºC) or higher, or as directed by your healthcare provider  · Shaking chills  · Vomiting or nausea that doesn't go away  · Numbness, coldness, or tingling around the incision area, or changes in  skin color  · Opening of the sutures or wound  · Stitches or staples come apart or fall out or surgical tape falls off before 7 days or as directed by your healthcare provider   Date Last Reviewed: 12/1/2016  © 7803-5004 [employer]. [address] [place] [address], [place] [address]. All rights reserved. This information is not intended as a substitute for professional medical care. Always follow your healthcare professional's instructions.

## 2017-12-13 NOTE — PLAN OF CARE
Pt stable, denies pain, and ready for discharge.  Discharge instructions and prescription given to pt and spouse.

## 2017-12-14 NOTE — OP NOTE
DATE OF PROCEDURE:  12/13/2017    PRIMARY SURGEON:  Rangel Loza M.D.     ASSISTANT:  Frank Fitch M.D. (RES)    PREOPERATIVE DIAGNOSIS:  Primary hyperparathyroidism.    POSTOPERATIVE DIAGNOSIS:  Primary hyperparathyroidism with primary   hyperparathyroidism due to a single gland left lower inferior parathyroid gland   adenoma.    PROCEDURE:  Parathyroidectomy of the left lower inferior parathyroid gland   adenoma with intraoperative intact parathyroid hormone levels x4.    PROCEDURE IN DETAIL:  The patient underwent informed consent.  The history and   physical examination was reviewed and marked.  The sestamibi scan images were   reviewed with the patient and her family.  The left side was marked.  A   transverse cervical collar incision was marked within a natural skin fold within   her neck.  She was brought to the Operating Room.  She underwent a general   endotracheal anesthesia.  She was in the supine position.  Both arms were tucked   at her side.  The head and neck were extended with a transverse roll behind her   shoulder blades.  A transverse cervical collar incision was made in the left   neck extending this just slightly to the right of midline to allow adequate   exposure.  The skin incision was made sharply.  The subcutaneous tissue and   platysmal layers were divided transversely with cautery.  Subplatysmal flaps   were raised inferiorly down to the sternal notch of the thyroid cartilage.  A   self-retaining retractor was placed.  The strap muscles were    longitudinally in the midline along the median raphe.  The left-sided   sternohyoid and sternothyroid strap muscles were reflected laterally to the left   to expose the inferior pole of the left thyroid gland.  The gland was rotated   anteromedially and superiorly to dissect the thyrothymic tract inferior to the   left thyroid pole.  We found a reddish brown marble-sized parathyroid-appearing   adenoma, which was spherical, firm in  consistency, but not invasive into any   structures.  It was reddish brown in color and had a polar vessel with it.  It   was circumferentially teased out and dissected.  The blood supply was divided   with the Harmonic scalpel and the gland was removed.  It measured approximately   1.2 x 1 x 1 cm.  It was submitted to Pathology for frozen section.  At this   point, we freya a time 0 baseline level from the left internal jugular vein   followed by a 5-minute post-excision level from the left internal jugular vein,   meanwhile while we were waiting for frozen section and the 20-minute tram to   pass.  We explored along the recurrent laryngeal nerve and identified a very   normal-appearing left upper superior parathyroid gland within some of the fat   near the superior pole vessels located just superior lateral and posterior to   the course of the recurrent laryngeal nerve within some fatty tissue adjacent to   the thyroid capsule.  At this point, we awaited the levels, we then freya a   20-minute level.  The initial baseline level came back at 69 and a subsequent   5-minute level came back at 62.  There was some surprise with the third level   coming back at 110.  All of these were drawn from the internal jugular vein.  We   therefore freya another level, which was a clotted specimen, so we then freya a   subsequent 70-minute level, which returned back at a level of 12 indicating   appropriate and adequate drop in the intraoperative intact parathyroid hormone   levels and with intraoperative findings of an adenoma-appearing gland inferiorly   and a normal gland appearing superiorly.  The contralateral right side was not   explored.  Hemostasis was achieved.  The wound was irrigated.  There was no   evidence of bleeding.  Estimated blood loss had been minimal.  Surgicel was   placed along the left tracheoesophageal groove.  No drains were placed.  The   strap muscles were reapproximated in the midline with interrupted  Vicryl   sutures.  The platysmal layer was closed with a running 3-0 Vicryl suture and   the skin closed with a running 4-0 Monocryl subcuticular skin closure.    Mastisol, Steri-Strips, sterile Telfa gauze, and Tegaderm dressing were applied.    Again, estimated blood loss was minimal.  There were no complications.  No   drains were placed.  The specimen was the left lower inferior parathyroid gland   adenoma, which was submitted to Pathology.  She was turned over to Anesthesia   for extubation and transported to the recovery area in a satisfactory condition.      FREDDY/PATRICIA  dd: 12/13/2017 14:24:30 (CST)  td: 12/13/2017 19:55:10 (CST)  Doc ID   #[id number]  Job ID #[id number]    CC:

## 2017-12-28 ENCOUNTER — LAB VISIT (OUTPATIENT)
Dept: LAB | Facility: HOSPITAL | Age: 57
End: 2017-12-28
Payer: COMMERCIAL

## 2017-12-28 ENCOUNTER — OFFICE VISIT (OUTPATIENT)
Dept: SURGERY | Facility: CLINIC | Age: 57
End: 2017-12-28
Payer: COMMERCIAL

## 2017-12-28 VITALS
DIASTOLIC BLOOD PRESSURE: 66 MMHG | HEIGHT: 67 IN | TEMPERATURE: 98 F | BODY MASS INDEX: 28.41 KG/M2 | SYSTOLIC BLOOD PRESSURE: 138 MMHG | WEIGHT: 181 LBS | HEART RATE: 82 BPM

## 2017-12-28 DIAGNOSIS — E21.3 HYPERPARATHYROIDISM: ICD-10-CM

## 2017-12-28 DIAGNOSIS — Z98.890 STATUS POST PARATHYROIDECTOMY: Primary | ICD-10-CM

## 2017-12-28 DIAGNOSIS — E21.0 PRIMARY HYPERPARATHYROIDISM: ICD-10-CM

## 2017-12-28 DIAGNOSIS — Z90.89 STATUS POST PARATHYROIDECTOMY: Primary | ICD-10-CM

## 2017-12-28 LAB
CALCIUM SERPL-MCNC: 9.7 MG/DL
PTH-INTACT SERPL-MCNC: 46 PG/ML

## 2017-12-28 PROCEDURE — 36415 COLL VENOUS BLD VENIPUNCTURE: CPT

## 2017-12-28 PROCEDURE — 83970 ASSAY OF PARATHORMONE: CPT

## 2017-12-28 PROCEDURE — 82310 ASSAY OF CALCIUM: CPT

## 2017-12-28 PROCEDURE — 99999 PR PBB SHADOW E&M-EST. PATIENT-LVL III: CPT | Mod: PBBFAC,,, | Performed by: SURGERY

## 2017-12-28 PROCEDURE — 99024 POSTOP FOLLOW-UP VISIT: CPT | Mod: S$GLB,,, | Performed by: SURGERY

## 2017-12-28 RX ORDER — PROMETHAZINE HYDROCHLORIDE 12.5 MG/1
TABLET ORAL
COMMUNITY
Start: 2017-12-22 | End: 2018-07-25

## 2017-12-28 RX ORDER — HYDROCODONE BITARTRATE AND ACETAMINOPHEN 5; 325 MG/1; MG/1
TABLET ORAL
COMMUNITY
Start: 2017-12-20 | End: 2018-07-25 | Stop reason: ALTCHOICE

## 2017-12-28 RX ORDER — OXYCODONE AND ACETAMINOPHEN 10; 325 MG/1; MG/1
TABLET ORAL
COMMUNITY
Start: 2017-12-22 | End: 2018-07-25

## 2017-12-28 RX ORDER — ERGOCALCIFEROL 1.25 MG/1
CAPSULE ORAL
COMMUNITY
Start: 2017-12-26 | End: 2018-02-09 | Stop reason: SDUPTHER

## 2017-12-28 NOTE — LETTER
[facility] - General Surgery  [address]  [facility] [address]  Phone: [phone] December 30, 2017      Meredith Izaguirre MD  [address]  [facility] [address]    Patient: Odilia Winslow   MR Number: [id number]   YOB: 1960   Date of Visit: 12/28/2017     Dear Dr. Izaguirre:    Thank you for referring Odilia Winslow to me for evaluation. Below are the relevant portions of my assessment and plan of care.    The patient is a 57-year-old  female well known to me, who presents for her initial postoperative encounter following parathyroidectomy of a left lower inferior parathyroid gland adenoma on 12/13/2017.  Her intraoperative intact parathyroid hormone levels dropped accordingly.  Final pathology revealed a 513 mg adenoma and the intraoperative intact parathyroid hormone levels had dropped down to a level of 12.  We freya a repeat intraoperative intact parathyroid hormone level today, which was 46 with a calcium of 9.7, which were both within the normal range.  A copy of the pathology report was discussed with the patient and provided to her. She has been counseled to discontinue her supplemental postoperative calcium carbonate and she will follow up with me p.r.n.      Of note, she had a traumatic injury with fracture to the left wrist perioperatively.  She states she fell a few days before surgery but did not realize her wrist was fractured until after surgery.  Certainly, with surgical correction of her primary hyperparathyroidism, this should help the bone healing of the fracture.  She offers no subjective complaints.  She denies any hoarseness.  She denies any signs or symptoms of hypercalcemia or hypocalcemia or Hungry Bone Syndrome. Supplemental calcium carbonate has been discontinued and she will follow up with me on a p.r.n. basis.    If you have questions, please do not hesitate to call me. I look forward to following Odilia along with  you.    Sincerely,      Rangel Loza MD   Medical Director, [facility]  Section of General and Oncologic Surgery  [facility]    RLC/hcr    CC  Lisseth Art NP

## 2017-12-30 NOTE — PROGRESS NOTES
The patient is a 57-year-old  female well known to me, who presents for   her initial postoperative encounter following parathyroidectomy of a left lower   inferior parathyroid gland adenoma on 12/13/2017.  Her intraoperative intact   parathyroid hormone levels dropped accordingly.  Final pathology revealed a 513   mg adenoma and the intraoperative intact parathyroid hormone levels had dropped   down to a level of 12.  We freya a repeat intraoperative intact parathyroid   hormone level today, which was 46 with a calcium of 9.7, which were both within   the normal range.  A copy of the pathology report was discussed with the patient   and provided to her.  She has been counseled to discontinue her supplemental   postoperative calcium carbonate and she will follow up with me p.r.n.      Of note, she had a traumatic injury with fracture to the left wrist   perioperatively.  She states she fell a few days before surgery but did not   realize her wrist was fractured until after surgery.  Certainly, with surgical   correction of her primary hyperparathyroidism, this should help the bone healing   of the fracture.  She offers no subjective complaints.  She denies any   hoarseness.  She denies any signs or symptoms of hypercalcemia or hypocalcemia   or Hungry Bone Syndrome.  Supplemental calcium carbonate has been discontinued   and she will follow up with me on a p.r.n. basis.      FREDDY/PATRICIA  dd: 12/30/2017 11:12:07 (CST)  td: 12/30/2017 20:06:03 (CST)  Doc ID   #[id number]  Job ID #[id number]    CC:     Job # [id number].

## 2018-01-04 ENCOUNTER — LAB VISIT (OUTPATIENT)
Dept: LAB | Facility: HOSPITAL | Age: 58
End: 2018-01-04
Attending: INTERNAL MEDICINE
Payer: COMMERCIAL

## 2018-01-04 DIAGNOSIS — E55.9 VITAMIN D DEFICIENCY: ICD-10-CM

## 2018-01-04 LAB — 25(OH)D3+25(OH)D2 SERPL-MCNC: 13 NG/ML

## 2018-01-04 PROCEDURE — 82306 VITAMIN D 25 HYDROXY: CPT

## 2018-01-04 PROCEDURE — 36415 COLL VENOUS BLD VENIPUNCTURE: CPT | Mod: PO

## 2018-01-05 ENCOUNTER — TELEPHONE (OUTPATIENT)
Dept: INTERNAL MEDICINE | Facility: CLINIC | Age: 58
End: 2018-01-05

## 2018-01-05 DIAGNOSIS — E55.9 VITAMIN D DEFICIENCY: Primary | ICD-10-CM

## 2018-01-05 RX ORDER — ERGOCALCIFEROL 1.25 MG/1
50000 CAPSULE ORAL
Qty: 12 CAPSULE | Refills: 1 | Status: SHIPPED | OUTPATIENT
Start: 2018-01-05 | End: 2018-07-25 | Stop reason: SDUPTHER

## 2018-01-05 NOTE — TELEPHONE ENCOUNTER
Please notify pt that her vit d is still low - rec cont weekly vit d x 6 months and start daily vitamin D 2000u as soon as finishes Rx to maintain her level - let's repeat vit d at that time

## 2018-01-05 NOTE — TELEPHONE ENCOUNTER
Called pt and left vm stating to return office call regarding vit d result.   Left office number for pt to return office call

## 2018-01-09 ENCOUNTER — TELEPHONE (OUTPATIENT)
Dept: ENDOCRINOLOGY | Facility: CLINIC | Age: 58
End: 2018-01-09

## 2018-01-09 DIAGNOSIS — Z90.89 S/P PARATHYROIDECTOMY: Primary | ICD-10-CM

## 2018-01-09 DIAGNOSIS — Z98.890 S/P PARATHYROIDECTOMY: Primary | ICD-10-CM

## 2018-02-02 ENCOUNTER — LAB VISIT (OUTPATIENT)
Dept: LAB | Facility: HOSPITAL | Age: 58
End: 2018-02-02
Attending: INTERNAL MEDICINE
Payer: COMMERCIAL

## 2018-02-02 DIAGNOSIS — Z98.890 S/P PARATHYROIDECTOMY: ICD-10-CM

## 2018-02-02 DIAGNOSIS — Z90.89 S/P PARATHYROIDECTOMY: ICD-10-CM

## 2018-02-02 LAB
25(OH)D3+25(OH)D2 SERPL-MCNC: 13 NG/ML
ALBUMIN SERPL BCP-MCNC: 3.9 G/DL
ANION GAP SERPL CALC-SCNC: 11 MMOL/L
BUN SERPL-MCNC: 13 MG/DL
CA-I BLDV-SCNC: 1.24 MMOL/L
CALCIUM SERPL-MCNC: 9.8 MG/DL
CHLORIDE SERPL-SCNC: 104 MMOL/L
CO2 SERPL-SCNC: 26 MMOL/L
CREAT SERPL-MCNC: 0.7 MG/DL
EST. GFR  (AFRICAN AMERICAN): >60 ML/MIN/1.73 M^2
EST. GFR  (NON AFRICAN AMERICAN): >60 ML/MIN/1.73 M^2
GLUCOSE SERPL-MCNC: 99 MG/DL
PHOSPHATE SERPL-MCNC: 2.4 MG/DL
POTASSIUM SERPL-SCNC: 4.1 MMOL/L
PTH-INTACT SERPL-MCNC: 44 PG/ML
SODIUM SERPL-SCNC: 141 MMOL/L

## 2018-02-02 PROCEDURE — 82306 VITAMIN D 25 HYDROXY: CPT

## 2018-02-02 PROCEDURE — 80069 RENAL FUNCTION PANEL: CPT

## 2018-02-02 PROCEDURE — 36415 COLL VENOUS BLD VENIPUNCTURE: CPT | Mod: PO

## 2018-02-02 PROCEDURE — 82330 ASSAY OF CALCIUM: CPT

## 2018-02-02 PROCEDURE — 83970 ASSAY OF PARATHORMONE: CPT

## 2018-02-09 ENCOUNTER — OFFICE VISIT (OUTPATIENT)
Dept: ENDOCRINOLOGY | Facility: CLINIC | Age: 58
End: 2018-02-09
Payer: COMMERCIAL

## 2018-02-09 VITALS
DIASTOLIC BLOOD PRESSURE: 83 MMHG | WEIGHT: 182.56 LBS | BODY MASS INDEX: 28.65 KG/M2 | SYSTOLIC BLOOD PRESSURE: 124 MMHG | HEIGHT: 67 IN | HEART RATE: 74 BPM

## 2018-02-09 DIAGNOSIS — E55.9 VITAMIN D DEFICIENCY: ICD-10-CM

## 2018-02-09 DIAGNOSIS — S62.102S LEFT WRIST FRACTURE, SEQUELA: ICD-10-CM

## 2018-02-09 DIAGNOSIS — M85.89 OSTEOPENIA OF MULTIPLE SITES: ICD-10-CM

## 2018-02-09 DIAGNOSIS — E21.0 PRIMARY HYPERPARATHYROIDISM: Primary | ICD-10-CM

## 2018-02-09 PROBLEM — E21.3 HYPERPARATHYROIDISM: Status: RESOLVED | Noted: 2017-12-13 | Resolved: 2018-02-09

## 2018-02-09 PROCEDURE — 99999 PR PBB SHADOW E&M-EST. PATIENT-LVL III: CPT | Mod: PBBFAC,,, | Performed by: INTERNAL MEDICINE

## 2018-02-09 PROCEDURE — 99214 OFFICE O/P EST MOD 30 MIN: CPT | Mod: S$GLB,,, | Performed by: INTERNAL MEDICINE

## 2018-02-09 PROCEDURE — 3008F BODY MASS INDEX DOCD: CPT | Mod: S$GLB,,, | Performed by: INTERNAL MEDICINE

## 2018-02-09 NOTE — PROGRESS NOTES
Chief Complaint: Hyperparathyroidism      HPI:  Odilia Winslow is 57 y.o. female S/P left lower inferior parathyroidectomy on 12/13/2017 for treatment of Primary Hyperparathyroidism. Surgery performed by Dr. Loza     The patient's intraoperative hormone levels dropped accordingly. Final pathology revealed:   SPECIMEN  1) Left inferior parathyroid.  FINAL PATHOLOGIC DIAGNOSIS  Parathyroid, left inferior, parathyroidectomy:  - Hypercellular parathyroid tissue, 513 mg.    Of note, prior to the surgery the patient visited [facility] in early 12/2017. She slipped and fell on wet concrete and sustained a left wrist fracture     BMD from 09/2017 noted Osteopenia but FRAX caculations did not suggest treatment     She has vitamin D deficiency and is currently taking Ergo 50 K once weekly   The patient endorses eating a calcium rich diet     She is no longer taking Tums or additional calcium supplementation     She denies recent falls since 12/2017     She denies formal exercise but states she is very active     The patient denies family history of Osteoporosis or stooped posture   She has never fractured prior to 12/2017     Review of Systems   Constitutional: Positive for fatigue. Negative for unexpected weight change.   HENT: Negative for sore throat, trouble swallowing and voice change.    Eyes: Negative for visual disturbance.   Respiratory: Negative for shortness of breath.    Cardiovascular: Negative for chest pain.   Gastrointestinal: Negative for abdominal pain and constipation.   Musculoskeletal: Negative for arthralgias and myalgias.        Left wrist pain    Neurological: Negative for dizziness and headaches.   Psychiatric/Behavioral: Negative for confusion and sleep disturbance.      Labs:  Results for ODILIA WINSLOW (MRN [id number]) as of 2/9/2018 12:20   Ref. Range 9/7/2017 12:37   PTH Latest Ref Range: 9.0 - 77.0 pg/mL 112.0 (H)     Results for ODILIA WINSLOW (MRN [id number]) as of 2/9/2018  12:20   Ref. Range 12/28/2017 16:26 2/2/2018 10:26   PTH Latest Ref Range: 9.0 - 77.0 pg/mL 46.0 44.0     Results for JUDY RAMÍREZ (MRN [id number]) as of 10/17/2017 14:16   Ref. Range 9/21/2017 10:34   Calcium, Urine Latest Ref Range: 0.0 - 15.0 mg/dL 8.3   Calcium, 24H Urine Latest Ref Range: 4 - 12 mg/Hr 9   CA Urine (mg/Spec) Latest Units: mg/Spec 224   Creatinine, Ur (mg/spec) Latest Units: mg/Spec 756.0     Lab Results   Component Value Date     02/02/2018    K 4.1 02/02/2018     02/02/2018    CO2 26 02/02/2018    GLU 99 02/02/2018    BUN 13 02/02/2018    CREATININE 0.7 02/02/2018    CALCIUM 9.8 02/02/2018    PROT 7.7 11/09/2017    ALBUMIN 3.9 02/02/2018    BILITOT 0.6 11/09/2017    ALKPHOS 136 (H) 11/09/2017    AST 40 11/09/2017    ALT 49 (H) 11/09/2017    ANIONGAP 11 02/02/2018    ESTGFRAFRICA >60.0 02/02/2018    EGFRNONAA >60.0 02/02/2018     Lab Results   Component Value Date    MOEZCWBX38YP 13 (L) 02/02/2018     Last BMD: from 09/28/2017  Impression    Low bone mass/osteopenia of the lumbar spine and femoral neck. FRAX calculations do not suggest treatment.     NM parathyroid scan: from 09/28/2017  Impression    Parathyroid adenoma at or just below the lower pole of the left lobe of the thyroid gland.     Physical Exam   Constitutional: She is oriented to person, place, and time. She appears well-developed. No distress.   Neck: Neck supple. No tracheal deviation present. No thyromegaly present.   Well healed anterior neck incision    Cardiovascular: Normal rate.    No murmur heard.  Pulmonary/Chest: Effort normal.   Abdominal: Soft.   Musculoskeletal: She exhibits no edema.   Lymphadenopathy:     She has no cervical adenopathy.   Neurological: She is alert and oriented to person, place, and time. She displays normal reflexes.   Skin: Skin is warm and dry. No rash noted.          Psychiatric: She has a normal mood and affect.   Nursing note and vitals reviewed.      Assessment and Plan:  1.  Primary Hyperparathyroidism - resolved, S/P left inferior parathyroidectomy   -- recommend periodic monitoring of serum calcium and iPTH   -- reviewed signs and symptoms of hypercalcemia   -- pt voiced understanding of the above   -- labs in 3 months     2. Vitamin D deficiency   -- continue Ergo 50 K once weekly   -- resume Vitamin D3 at 2,000 IU's once daily   -- check labs in 3 months     3. Left wrist fracture   -- pt sustained a traumatic fracture while visiting [place] in 12/2017   -- recommend repeat BMD in 09/2018   -- we suspect BMD to improve over time   -- if BMD declines, will consider starting oral bisphosphonate therapy   -- pt voiced understanding of the above information     4. Osteopenia   -- recommend repeat BMD in 09/2018   -- RDA of calcium and vitamin D, calcium from food sources are preferred   -- emphasized fall safety and fall precautions   -- encouraged weight bearing exercises as tolerated     Case discussed in consultation with Dr. Gonsalves   Recommendations were discussed with the patient in detail  The patient verbalized understanding and agrees with the plan outlined as above.

## 2018-04-30 ENCOUNTER — LAB VISIT (OUTPATIENT)
Dept: LAB | Facility: HOSPITAL | Age: 58
End: 2018-04-30
Attending: INTERNAL MEDICINE
Payer: COMMERCIAL

## 2018-04-30 DIAGNOSIS — E21.0 PRIMARY HYPERPARATHYROIDISM: ICD-10-CM

## 2018-04-30 DIAGNOSIS — E55.9 VITAMIN D DEFICIENCY: ICD-10-CM

## 2018-04-30 LAB
25(OH)D3+25(OH)D2 SERPL-MCNC: 20 NG/ML
ALBUMIN SERPL BCP-MCNC: 4.3 G/DL
ANION GAP SERPL CALC-SCNC: 13 MMOL/L
BUN SERPL-MCNC: 20 MG/DL
CALCIUM SERPL-MCNC: 10.3 MG/DL
CHLORIDE SERPL-SCNC: 102 MMOL/L
CO2 SERPL-SCNC: 22 MMOL/L
CREAT SERPL-MCNC: 0.8 MG/DL
EST. GFR  (AFRICAN AMERICAN): >60 ML/MIN/1.73 M^2
EST. GFR  (NON AFRICAN AMERICAN): >60 ML/MIN/1.73 M^2
GLUCOSE SERPL-MCNC: 108 MG/DL
PHOSPHATE SERPL-MCNC: 2.7 MG/DL
POTASSIUM SERPL-SCNC: 4.2 MMOL/L
PTH-INTACT SERPL-MCNC: 48 PG/ML
SODIUM SERPL-SCNC: 137 MMOL/L

## 2018-04-30 PROCEDURE — 82306 VITAMIN D 25 HYDROXY: CPT

## 2018-04-30 PROCEDURE — 80069 RENAL FUNCTION PANEL: CPT

## 2018-04-30 PROCEDURE — 36415 COLL VENOUS BLD VENIPUNCTURE: CPT | Mod: PO

## 2018-04-30 PROCEDURE — 83970 ASSAY OF PARATHORMONE: CPT

## 2018-05-01 ENCOUNTER — TELEPHONE (OUTPATIENT)
Dept: ENDOCRINOLOGY | Facility: CLINIC | Age: 58
End: 2018-05-01

## 2018-05-01 DIAGNOSIS — E55.9 VITAMIN D DEFICIENCY: Primary | ICD-10-CM

## 2018-05-01 DIAGNOSIS — Z98.890 HISTORY OF PARATHYROID SURGERY: ICD-10-CM

## 2018-06-20 RX ORDER — VALSARTAN 160 MG/1
TABLET ORAL
Qty: 90 TABLET | Refills: 3 | Status: SHIPPED | OUTPATIENT
Start: 2018-06-20 | End: 2019-07-16 | Stop reason: SDUPTHER

## 2018-07-25 ENCOUNTER — OFFICE VISIT (OUTPATIENT)
Dept: INTERNAL MEDICINE | Facility: CLINIC | Age: 58
End: 2018-07-25
Attending: INTERNAL MEDICINE
Payer: COMMERCIAL

## 2018-07-25 VITALS
OXYGEN SATURATION: 97 % | HEART RATE: 89 BPM | WEIGHT: 178.13 LBS | SYSTOLIC BLOOD PRESSURE: 114 MMHG | BODY MASS INDEX: 27.96 KG/M2 | HEIGHT: 67 IN | DIASTOLIC BLOOD PRESSURE: 84 MMHG

## 2018-07-25 DIAGNOSIS — I10 HTN (HYPERTENSION), BENIGN: ICD-10-CM

## 2018-07-25 DIAGNOSIS — E55.9 VITAMIN D DEFICIENCY: ICD-10-CM

## 2018-07-25 DIAGNOSIS — K21.9 GASTROESOPHAGEAL REFLUX DISEASE, ESOPHAGITIS PRESENCE NOT SPECIFIED: ICD-10-CM

## 2018-07-25 DIAGNOSIS — Z98.890 HISTORY OF PARATHYROID SURGERY: ICD-10-CM

## 2018-07-25 DIAGNOSIS — E78.5 HYPERLIPIDEMIA, UNSPECIFIED HYPERLIPIDEMIA TYPE: ICD-10-CM

## 2018-07-25 DIAGNOSIS — Z00.00 ANNUAL PHYSICAL EXAM: Primary | ICD-10-CM

## 2018-07-25 DIAGNOSIS — R73.01 IFG (IMPAIRED FASTING GLUCOSE): ICD-10-CM

## 2018-07-25 DIAGNOSIS — Z12.39 SCREENING FOR BREAST CANCER: ICD-10-CM

## 2018-07-25 DIAGNOSIS — M85.89 OSTEOPENIA OF MULTIPLE SITES: ICD-10-CM

## 2018-07-25 PROBLEM — Z01.818 PREOP TESTING: Status: RESOLVED | Noted: 2017-11-12 | Resolved: 2018-07-25

## 2018-07-25 PROCEDURE — 3079F DIAST BP 80-89 MM HG: CPT | Mod: CPTII,S$GLB,, | Performed by: INTERNAL MEDICINE

## 2018-07-25 PROCEDURE — 3074F SYST BP LT 130 MM HG: CPT | Mod: CPTII,S$GLB,, | Performed by: INTERNAL MEDICINE

## 2018-07-25 PROCEDURE — 99999 PR PBB SHADOW E&M-EST. PATIENT-LVL III: CPT | Mod: PBBFAC,,, | Performed by: INTERNAL MEDICINE

## 2018-07-25 PROCEDURE — 99396 PREV VISIT EST AGE 40-64: CPT | Mod: S$GLB,,, | Performed by: INTERNAL MEDICINE

## 2018-07-25 RX ORDER — ERGOCALCIFEROL 1.25 MG/1
CAPSULE ORAL
Qty: 12 CAPSULE | Refills: 0 | Status: SHIPPED | OUTPATIENT
Start: 2018-07-25 | End: 2019-07-16

## 2018-07-25 NOTE — PROGRESS NOTES
"Subjective:   Patient ID: Odilia Winslow is a 58 y.o. female  Chief complaint:   Chief Complaint   Patient presents with    Annual Exam       HPI  Here for annual exam   Had parathyroidectomy and calcium and pth wnl 4/2018 - followed by endocrine    Pt had fall in [place] in December before parathyroidectomy and fell on extended arm and broke wrist - f/u with ortho and this healed without surgery  BMD performed and osteopenia  Vit d def: taking ergo to improve this   Getting reg exercise     Gerd: given dexilant by GI specialist for hx of gerd and esophageal stricture s/p dilatation - started taking otc prilosec which is effective   - has not tried zantac    - no dysphagia    HTN: controlled on current med  HLD: lipitor daily - tolerating   Anxiety: controlled on ssri     Review of Systems    Objective:  Vitals:    07/25/18 1418   BP: 114/84   Pulse: 89   SpO2: 97%   Weight: 80.8 kg (178 lb 2.1 oz)   Height: 5' 7" (1.702 m)     Body mass index is 27.9 kg/m².    Physical Exam   Constitutional: She is oriented to person, place, and time. She appears well-developed and well-nourished.   HENT:   Head: Normocephalic and atraumatic.   Right Ear: External ear normal.   Left Ear: External ear normal.   Nose: Nose normal.   Mouth/Throat: Oropharynx is clear and moist. No oropharyngeal exudate.   No carotid bruits   Eyes: Conjunctivae and EOM are normal.   Neck: Neck supple. No thyromegaly present.   Cardiovascular: Normal rate, regular rhythm, normal heart sounds and intact distal pulses.    Pulmonary/Chest: Effort normal and breath sounds normal.   Abdominal: Soft. Bowel sounds are normal.   Musculoskeletal: She exhibits no edema or tenderness.   Lymphadenopathy:     She has no cervical adenopathy.   Neurological: She is alert and oriented to person, place, and time.   Skin: Skin is warm and dry.   Psychiatric: Her behavior is normal. Thought content normal.   Vitals reviewed.      Assessment:  1. Annual physical exam  "   2. Vitamin D deficiency    3. Osteopenia of multiple sites    4. HTN (hypertension), benign    5. Gastroesophageal reflux disease, esophagitis presence not specified    6. Hyperlipidemia, unspecified hyperlipidemia type    7. History of parathyroid surgery    8. IFG (impaired fasting glucose)    9. Screening for breast cancer        Plan:  Odilia was seen today for annual exam.    Diagnoses and all orders for this visit:    Annual physical exam  -     TSH; Future  -     Lipid panel; Future  -     Hemoglobin A1c; Future  -     CBC auto differential; Future  -     Comprehensive metabolic panel; Future  -     Vitamin D; Future    Vitamin D deficiency  -     Vitamin D; Future    Osteopenia of multiple sites    HTN (hypertension), benign    Gastroesophageal reflux disease, esophagitis presence not specified    Hyperlipidemia, unspecified hyperlipidemia type    History of parathyroid surgery    IFG (impaired fasting glucose)  -     Hemoglobin A1c; Future    Screening for breast cancer  -     Mammo Digital Screening Bilat with Tomosynthesis CAD; Future    Other orders  -     Cancel: Hypertension Digital Medicine (HDMP) Enrollment Order  -     ranitidine (ZANTAC) 150 MG tablet; Take 150mg every 12 hours x 4 weeks and then once daily    cont current meds   Keep appt with specialists   Repeat DEXA 9/2018 - reviewed endo recs   Check vit d   Health Maintenance   Topic Date Due    Zoster Vaccine  03/25/2018    Influenza Vaccine  08/01/2018    Mammogram  07/19/2019    Pap Smear with HPV Cotest  12/11/2019    Lipid Panel  07/19/2022    Colonoscopy  09/20/2022    TETANUS VACCINE  05/04/2026    Hepatitis C Screening  Completed

## 2018-07-25 NOTE — PATIENT INSTRUCTIONS
Take zantac 150mg twice daily x 4 weeks and then decrease to 150mg once daily   Stop prilosec 1 week after starting zantac     In pharmacy:   shingrix (shingles)

## 2018-07-31 ENCOUNTER — HOSPITAL ENCOUNTER (OUTPATIENT)
Dept: RADIOLOGY | Facility: HOSPITAL | Age: 58
Discharge: HOME | End: 2018-07-31
Attending: INTERNAL MEDICINE
Payer: COMMERCIAL

## 2018-07-31 DIAGNOSIS — Z12.39 SCREENING FOR BREAST CANCER: ICD-10-CM

## 2018-07-31 DIAGNOSIS — Z78.0 MENOPAUSE: ICD-10-CM

## 2018-07-31 RX ORDER — PROGESTERONE 100 MG/1
100 CAPSULE ORAL DAILY
Qty: 30 CAPSULE | Refills: 0 | OUTPATIENT
Start: 2018-07-31

## 2018-09-10 DIAGNOSIS — Z78.0 MENOPAUSE: ICD-10-CM

## 2018-09-10 RX ORDER — PROGESTERONE 100 MG/1
100 CAPSULE ORAL DAILY
Qty: 30 CAPSULE | Refills: 0 | Status: SHIPPED | OUTPATIENT
Start: 2018-09-10 | End: 2018-10-17 | Stop reason: SDUPTHER

## 2018-09-12 RX ORDER — ATORVASTATIN CALCIUM 20 MG/1
TABLET, FILM COATED ORAL
Qty: 90 TABLET | Refills: 0 | Status: SHIPPED | OUTPATIENT
Start: 2018-09-12 | End: 2018-12-26 | Stop reason: SDUPTHER

## 2018-10-13 DIAGNOSIS — Z78.0 MENOPAUSE: ICD-10-CM

## 2018-10-15 RX ORDER — PROGESTERONE 100 MG/1
CAPSULE ORAL
Qty: 30 CAPSULE | Refills: 0 | OUTPATIENT
Start: 2018-10-15

## 2018-10-17 DIAGNOSIS — Z78.0 MENOPAUSE: ICD-10-CM

## 2018-10-18 RX ORDER — PROGESTERONE 100 MG/1
100 CAPSULE ORAL DAILY
Qty: 30 CAPSULE | Refills: 0 | Status: SHIPPED | OUTPATIENT
Start: 2018-10-18 | End: 2018-10-25 | Stop reason: SDUPTHER

## 2018-10-25 ENCOUNTER — OFFICE VISIT (OUTPATIENT)
Dept: OBSTETRICS AND GYNECOLOGY | Facility: CLINIC | Age: 58
End: 2018-10-25
Payer: COMMERCIAL

## 2018-10-25 VITALS
WEIGHT: 176.81 LBS | DIASTOLIC BLOOD PRESSURE: 82 MMHG | HEIGHT: 67 IN | BODY MASS INDEX: 27.75 KG/M2 | SYSTOLIC BLOOD PRESSURE: 144 MMHG

## 2018-10-25 DIAGNOSIS — Z78.0 MENOPAUSE: ICD-10-CM

## 2018-10-25 DIAGNOSIS — Z11.51 SCREENING FOR HPV (HUMAN PAPILLOMAVIRUS): ICD-10-CM

## 2018-10-25 DIAGNOSIS — Z01.419 ENCOUNTER FOR GYNECOLOGICAL EXAMINATION: Primary | ICD-10-CM

## 2018-10-25 DIAGNOSIS — Z12.31 ENCOUNTER FOR SCREENING MAMMOGRAM FOR MALIGNANT NEOPLASM OF BREAST: ICD-10-CM

## 2018-10-25 DIAGNOSIS — Z12.4 ENCOUNTER FOR PAPANICOLAOU SMEAR FOR CERVICAL CANCER SCREENING: ICD-10-CM

## 2018-10-25 PROCEDURE — 99999 PR PBB SHADOW E&M-EST. PATIENT-LVL III: CPT | Mod: PBBFAC,,, | Performed by: OBSTETRICS & GYNECOLOGY

## 2018-10-25 PROCEDURE — 99396 PREV VISIT EST AGE 40-64: CPT | Mod: S$GLB,,, | Performed by: OBSTETRICS & GYNECOLOGY

## 2018-10-25 PROCEDURE — 3079F DIAST BP 80-89 MM HG: CPT | Mod: CPTII,S$GLB,, | Performed by: OBSTETRICS & GYNECOLOGY

## 2018-10-25 PROCEDURE — 87624 HPV HI-RISK TYP POOLED RSLT: CPT

## 2018-10-25 PROCEDURE — 88175 CYTOPATH C/V AUTO FLUID REDO: CPT

## 2018-10-25 PROCEDURE — 3077F SYST BP >= 140 MM HG: CPT | Mod: CPTII,S$GLB,, | Performed by: OBSTETRICS & GYNECOLOGY

## 2018-10-25 RX ORDER — PROGESTERONE 100 MG/1
100 CAPSULE ORAL DAILY
Qty: 90 CAPSULE | Refills: 3 | Status: SHIPPED | OUTPATIENT
Start: 2018-10-25 | End: 2019-11-02 | Stop reason: SDUPTHER

## 2018-10-25 NOTE — PROGRESS NOTES
Subjective:       Patient ID: Odilia Winslow is a 58 y.o. female.    Chief Complaint:  Well Woman (mammo 7/2017 pap nl 2014)      History of Present Illness  - here for annual. Doing very well on prometrium only with no vaginal bleeding.    Past Medical History:   Diagnosis Date    Anxiety     Esophageal stricture     GERD (gastroesophageal reflux disease)     Hyperlipidemia     Hypertension     Insomnia     Irritable bowel syndrome     Postmenopausal bleeding        Past Surgical History:   Procedure Laterality Date    DILATION AND CURETTAGE OF UTERUS  2007, 2013    ECTOPIC PREGNANCY SURGERY  1986    removal of portion of tube    gerd      HYSTEROSCOPY, WITH DILATION AND CURETTAGE OF UTERUS N/A 5/16/2013    Performed by Liliana Colon MD at [facility] OR 2ND FLR    [id number]-[id number] N/A 6/20/2017    Performed [facility] at [facility] OR    KNEE SURGERY Right 2015    arthroscopy    PARATHYROIDECTOMY  LEFT LOWER  WITH INTRA-OP PTH LEVEL  Left 12/13/2017    Performed by Rangel Loza MD at [facility] OR 2ND FLR    TONSILLECTOMY           Current Outpatient Medications:     amlodipine (NORVASC) 5 MG tablet, Take 1 tablet (5 mg total) by mouth once daily., Disp: 90 tablet, Rfl: 3    atorvastatin (LIPITOR) 20 MG tablet, TAKE ONE DAILY, Disp: 90 tablet, Rfl: 0    escitalopram oxalate (LEXAPRO) 20 MG tablet, Take 1 tablet (20 mg total) by mouth once daily., Disp: 90 tablet, Rfl: 3    progesterone (PROMETRIUM) 100 MG capsule, Take 1 capsule (100 mg total) by mouth once daily., Disp: 90 capsule, Rfl: 3    valacyclovir (VALTREX) 1000 MG tablet, TAKE (1/2) HALF BY MOUTH EVERY 12 HOURS (Patient taking differently: TAKE (1/2) HALF BY MOUTH EVERY 12 HOURS PRN), Disp: 30 tablet, Rfl: 3    valsartan (DIOVAN) 160 MG tablet, TAKE ONE BY MOUTH EVERY MORNING, Disp: 90 tablet, Rfl: 3    vitamin D 1000 units Tab, Take 2,000 Units by mouth once daily., Disp: , Rfl:     VITAMIN D2  50,000 unit capsule, TAKE ONE BY MOUTH EVERY 7 DAYS, Disp: 12 capsule, Rfl: 0    docusate sodium (COLACE) 100 MG capsule, Take 1 capsule (100 mg total) by mouth 2 (two) times daily., Disp: , Rfl: 0    methen-m.blue-s.phos-phsal-hyo 118-10-40.8-36 mg Cap, Take 1 capsule by mouth every 6 (six) hours as needed., Disp: 10 capsule, Rfl: 3    ranitidine (ZANTAC) 150 MG tablet, Take 150mg every 12 hours x 4 weeks and then once daily, Disp: 60 tablet, Rfl: 3    Review of patient's allergies indicates:  No Known Allergies    GYN & OB History  Patient's last menstrual period was 2016.   Date of Last Pap: 2015    OB History    Para Term  AB Living   6 5 4   1 3   SAB TAB Ectopic Multiple Live Births       1   3      # Outcome Date GA Lbr Carlos/2nd Weight Sex Delivery Anes PTL Lv   6 Term            5 Term            4 Ectopic            3 Para      Vag-Spont  N RACHEL   2 Term      Vag-Spont  N RACHEL   1 Term      Vag-Spont  N RACHEL      Obstetric Comments   Menopause age 46       Social History     Socioeconomic History    Marital status:      Spouse name: Not on file    Number of children: Not on file    Years of education: Not on file    Highest education level: Not on file   Social Needs    Financial resource strain: Not on file    Food insecurity - worry: Not on file    Food insecurity - inability: Not on file    Transportation needs - medical: Not on file    Transportation needs - non-medical: Not on file   Occupational History    Not on file   Tobacco Use    Smoking status: Never Smoker    Smokeless tobacco: Never Used   Substance and Sexual Activity    Alcohol use: Yes     Comment: occasional    Drug use: No    Sexual activity: Yes     Partners: Male     Birth control/protection: Post-menopausal     Comment:  to Dylan   Other Topics Concern    Not on file   Social History Narrative    Exercising regularly    From [facility]    Living in [place] with spouse and 2 children  "      Family History   Problem Relation Age of Onset    Heart disease Mother         cad s/p stent    Hypertension Mother     Cancer Mother         MDS    Diabetes Father     Cancer Father         lung, + tobacco    Breast cancer Maternal Aunt     No Known Problems Sister     Cancer Brother         lung, + tobacco    Thyroid disease Daughter     Bipolar disorder Son     No Known Problems Sister     No Known Problems Brother     No Known Problems Son     Colon cancer Neg Hx     Ovarian cancer Neg Hx        Review of Systems  Review of Systems   Respiratory: Negative for shortness of breath.    Cardiovascular: Negative for chest pain and palpitations.   Gastrointestinal: Negative for blood in stool, nausea and vomiting.   Genitourinary:        - see HPI   Skin: Negative for rash and wound.   Allergic/Immunologic: Negative for immunocompromised state.   Neurological: Negative for dizziness and syncope.   Hematological: Negative for adenopathy.   Psychiatric/Behavioral: Negative for behavioral problems.        Objective:     Vitals:    10/25/18 1505   BP: (!) 144/82   Weight: 80.2 kg (176 lb 12.9 oz)   Height: 5' 7" (1.702 m)       Physical Exam:   Constitutional: She is oriented to person, place, and time. She appears well-developed and well-nourished.        Pulmonary/Chest: Right breast exhibits no mass, no nipple discharge, no skin change, no tenderness and no swelling. Left breast exhibits no mass, no nipple discharge, no skin change, no tenderness and no swelling. Breasts are symmetrical.        Abdominal: Soft. She exhibits no distension. There is no tenderness.     Genitourinary: Vagina normal and uterus normal. There is no tenderness or lesion on the right labia. There is no tenderness or lesion on the left labia. Cervix is normal. Right adnexum displays no mass, no tenderness and no fullness. Left adnexum displays no mass, no tenderness and no fullness. No vaginal discharge found. Additional " cervical findings: pap smear done          Musculoskeletal: Moves all extremeties.       Neurological: She is alert and oriented to person, place, and time.     Psychiatric: She has a normal mood and affect.        Assessment/ Plan:     Orders Placed This Encounter    HPV High Risk Genotypes, PCR    Mammo Digital Screening Bilat w/ Saud    Liquid-based pap smear, screening    progesterone (PROMETRIUM) 100 MG capsule       Odilia was seen today for well woman.    Diagnoses and all orders for this visit:    Encounter for gynecological examination    Encounter for Papanicolaou smear for cervical cancer screening  -     Liquid-based pap smear, screening    Screening for HPV (human papillomavirus)  -     HPV High Risk Genotypes, PCR    Encounter for screening mammogram for malignant neoplasm of breast  -     Mammo Digital Screening Bilat w/ Saud; Future    Menopause  -     progesterone (PROMETRIUM) 100 MG capsule; Take 1 capsule (100 mg total) by mouth once daily.        Follow-up in about 1 year (around 10/25/2019) for annual exam.

## 2018-10-31 LAB
HPV HR 12 DNA CVX QL NAA+PROBE: NEGATIVE
HPV16 AG SPEC QL: NEGATIVE
HPV18 DNA SPEC QL NAA+PROBE: NEGATIVE

## 2018-11-16 RX ORDER — ESCITALOPRAM OXALATE 20 MG/1
TABLET ORAL
Qty: 90 TABLET | Refills: 3 | Status: SHIPPED | OUTPATIENT
Start: 2018-11-16 | End: 2019-07-16 | Stop reason: SDUPTHER

## 2018-12-04 RX ORDER — AMLODIPINE BESYLATE 5 MG/1
TABLET ORAL
Qty: 90 TABLET | Refills: 3 | Status: SHIPPED | OUTPATIENT
Start: 2018-12-04 | End: 2019-07-16 | Stop reason: SDUPTHER

## 2018-12-12 ENCOUNTER — HOSPITAL ENCOUNTER (OUTPATIENT)
Dept: RADIOLOGY | Facility: HOSPITAL | Age: 58
Discharge: HOME | End: 2018-12-12
Attending: OBSTETRICS & GYNECOLOGY
Payer: COMMERCIAL

## 2018-12-12 DIAGNOSIS — Z12.31 ENCOUNTER FOR SCREENING MAMMOGRAM FOR MALIGNANT NEOPLASM OF BREAST: ICD-10-CM

## 2018-12-12 PROCEDURE — 77067 SCR MAMMO BI INCL CAD: CPT | Mod: 26,,, | Performed by: RADIOLOGY

## 2018-12-12 PROCEDURE — 77063 BREAST TOMOSYNTHESIS BI: CPT | Mod: 26,,, | Performed by: RADIOLOGY

## 2018-12-12 PROCEDURE — 77063 BREAST TOMOSYNTHESIS BI: CPT | Mod: TC,PO

## 2018-12-21 ENCOUNTER — OFFICE VISIT (OUTPATIENT)
Dept: URGENT CARE | Facility: CLINIC | Age: 58
End: 2018-12-21
Payer: COMMERCIAL

## 2018-12-21 VITALS
RESPIRATION RATE: 16 BRPM | TEMPERATURE: 98 F | OXYGEN SATURATION: 97 % | HEART RATE: 89 BPM | DIASTOLIC BLOOD PRESSURE: 78 MMHG | BODY MASS INDEX: 26.68 KG/M2 | WEIGHT: 170 LBS | SYSTOLIC BLOOD PRESSURE: 134 MMHG | HEIGHT: 67 IN

## 2018-12-21 DIAGNOSIS — R09.81 SINUS CONGESTION: ICD-10-CM

## 2018-12-21 DIAGNOSIS — J06.9 UPPER RESPIRATORY TRACT INFECTION, UNSPECIFIED TYPE: Primary | ICD-10-CM

## 2018-12-21 DIAGNOSIS — R05.9 COUGH: ICD-10-CM

## 2018-12-21 PROCEDURE — 96372 THER/PROPH/DIAG INJ SC/IM: CPT | Mod: 59,S$GLB,, | Performed by: FAMILY MEDICINE

## 2018-12-21 PROCEDURE — 3075F SYST BP GE 130 - 139MM HG: CPT | Mod: CPTII,S$GLB,, | Performed by: FAMILY MEDICINE

## 2018-12-21 PROCEDURE — 3008F BODY MASS INDEX DOCD: CPT | Mod: CPTII,S$GLB,, | Performed by: FAMILY MEDICINE

## 2018-12-21 PROCEDURE — 3078F DIAST BP <80 MM HG: CPT | Mod: CPTII,S$GLB,, | Performed by: FAMILY MEDICINE

## 2018-12-21 PROCEDURE — 99214 OFFICE O/P EST MOD 30 MIN: CPT | Mod: 25,S$GLB,, | Performed by: FAMILY MEDICINE

## 2018-12-21 RX ORDER — AZELASTINE 1 MG/ML
1 SPRAY, METERED NASAL 2 TIMES DAILY
Qty: 30 ML | Refills: 0 | Status: SHIPPED | OUTPATIENT
Start: 2018-12-21 | End: 2019-07-16

## 2018-12-21 RX ORDER — BENZONATATE 200 MG/1
200 CAPSULE ORAL 3 TIMES DAILY PRN
Qty: 30 CAPSULE | Refills: 0 | Status: SHIPPED | OUTPATIENT
Start: 2018-12-21 | End: 2018-12-31

## 2018-12-21 RX ORDER — BETAMETHASONE SODIUM PHOSPHATE AND BETAMETHASONE ACETATE 3; 3 MG/ML; MG/ML
6 INJECTION, SUSPENSION INTRA-ARTICULAR; INTRALESIONAL; INTRAMUSCULAR; SOFT TISSUE
Status: COMPLETED | OUTPATIENT
Start: 2018-12-21 | End: 2018-12-21

## 2018-12-21 RX ADMIN — BETAMETHASONE SODIUM PHOSPHATE AND BETAMETHASONE ACETATE 6 MG: 3; 3 INJECTION, SUSPENSION INTRA-ARTICULAR; INTRALESIONAL; INTRAMUSCULAR; SOFT TISSUE at 09:12

## 2018-12-21 NOTE — PROGRESS NOTES
"Subjective:       Patient ID: Odilia Winslow is a 58 y.o. female.    Vitals:  height is 5' 7" (1.702 m).     Chief Complaint: Sinus Problem    sympotms started 3 days ago      Sinus Problem   This is a new problem. The current episode started in the past 7 days. There has been no fever. Her pain is at a severity of 4/10. The pain is moderate. Associated symptoms include coughing and sinus pressure. Treatments tried: otc sough meds. The treatment provided no relief.       HENT: Positive for sinus pain and sinus pressure.    Respiratory: Positive for cough and sputum production.        Objective:      Physical Exam    Assessment:       No diagnosis found.    Plan:         There are no diagnoses linked to this encounter.     "

## 2018-12-21 NOTE — PROGRESS NOTES
"Subjective:       Patient ID: Odilia Winslow is a 58 y.o. female.    Vitals:  height is 5' 7" (1.702 m) and weight is 77.1 kg (170 lb). Her temperature is 97.7 °F (36.5 °C). Her blood pressure is 134/78 and her pulse is 89. Her respiration is 16 and oxygen saturation is 97%.     Chief Complaint: Sinus Problem    Sinus Problem   This is a new problem. The current episode started in the past 7 days. The problem has been gradually worsening since onset. There has been no fever. Her pain is at a severity of 4/10. The pain is moderate. Associated symptoms include congestion, coughing and sinus pressure. Pertinent negatives include no chills, diaphoresis, ear pain, shortness of breath or sore throat. Treatments tried: otc cough meds. The treatment provided no relief.       Constitution: Negative for chills, sweating, fatigue and fever.   HENT: Positive for congestion, sinus pain and sinus pressure. Negative for ear pain, sore throat and voice change.    Neck: Negative for painful lymph nodes.   Eyes: Negative for eye redness.   Respiratory: Positive for cough and sputum production. Negative for chest tightness, bloody sputum, COPD, shortness of breath, stridor, wheezing and asthma.    Gastrointestinal: Negative for nausea and vomiting.   Musculoskeletal: Negative for muscle ache.   Skin: Negative for rash and erythema.   Allergic/Immunologic: Negative for seasonal allergies and asthma.   Hematologic/Lymphatic: Negative for swollen lymph nodes.       Objective:      Physical Exam   Constitutional: She is oriented to person, place, and time. She appears well-developed and well-nourished.   HENT:   Head: Normocephalic and atraumatic.   Right Ear: External ear normal.   Left Ear: External ear normal.   Mouth/Throat: Oropharynx is clear and moist.   Eyes: EOM are normal. Pupils are equal, round, and reactive to light.   Neck: Normal range of motion. Neck supple. No thyromegaly present.   Cardiovascular: Normal rate, " regular rhythm and normal heart sounds.   No murmur heard.  Pulmonary/Chest: Breath sounds normal. No respiratory distress. She has no wheezes. She has no rales.   Abdominal: Soft. Bowel sounds are normal. She exhibits no distension. There is no tenderness. There is no rebound and no guarding.   Musculoskeletal: Normal range of motion. She exhibits no edema, tenderness or deformity.   Lymphadenopathy:     She has no cervical adenopathy.   Neurological: She is alert and oriented to person, place, and time. She displays normal reflexes. No cranial nerve deficit. She exhibits normal muscle tone. Coordination normal.   Skin: Skin is warm. Capillary refill takes less than 2 seconds. No rash noted. No erythema. No pallor.   Psychiatric: She has a normal mood and affect. Her behavior is normal. Judgment and thought content normal.   Vitals reviewed.      Assessment:       1. Upper respiratory tract infection, unspecified type    2. Sinus congestion    3. Cough        Plan:         Upper respiratory tract infection, unspecified type    Sinus congestion  -     azelastine (ASTELIN) 137 mcg (0.1 %) nasal spray; 1 spray (137 mcg total) by Nasal route 2 (two) times daily.  Dispense: 30 mL; Refill: 0  -     betamethasone acetate-betamethasone sodium phosphate injection 6 mg    Cough  -     benzonatate (TESSALON) 200 MG capsule; Take 1 capsule (200 mg total) by mouth 3 (three) times daily as needed for Cough.  Dispense: 30 capsule; Refill: 0  -     betamethasone acetate-betamethasone sodium phosphate injection 6 mg          Patient Instructions       Preventing Common Respiratory Infections  Respiratory infections such as colds and influenza (the flu) are common in winter. These infections are often caused by viruses. They may share some symptoms, but not all respiratory infections are the same. Some make you more sick than others. You can take steps to prevent common respiratory infections. And if you get sick, you can take  care of yourself to keep the infection from getting worse.    What is a cold?  · Symptoms include runny nose, coughing and sneezing, and sore throat. Cold symptoms tend to be milder than flu symptoms.  · Symptoms tend to come on slowly. They last for a few days to about a week.  · With a cold, you can still do most of the things you usually do.  What is the flu?  · Symptoms include fever, headache, fatigue, cough, sore throat, runny nose, and muscle aches. Children may have upset stomach and vomiting, but adults usually dont.  · Symptoms tend to come on quickly. Some, such as fatigue and cough, can last a few weeks.  · With the flu, you may feel worn out and not able to do normal activities.  · Its most likely NOT the flu if an adult has vomiting or diarrhea for a day or two. This so-called stomach flu is probably a GI (gastrointestinal) infection.  When the infection gets worse  Without proper care, a respiratory infection can get worse. It can lead to serious complications and death. If you arent getting better, call your healthcare provider. Complications can include:  · Bronchitis (infection of the airways that leads to shortness of breath and coughing up thick yellow or green mucus)  · Pneumonia (infection of the lungs in which fluid and mucus settle in the lungs, making breathing difficult)  · Worsening of chronic conditions such as heart failure, chronic lung disease, asthma, or diabetes  · Severe dehydration (loss of fluids)  · Sinus problems  · Ear infections   Get a flu vaccine  A flu vaccine protects you from influenza (but not other colds or infections). Get a vaccine each fall, before flu season starts. This can be done at a clinic, healthcare providers office, drugstore, senior center, or through your workplace.  Get pneumococcal vaccines  Pneumonia can be a complication of influenza. There are 2 pneumococcal pneumonia vaccines that protect against many types of pneumonia. Talk with your  healthcare provider about these important vaccines.   Keep germs from spreading  No one likes getting sick. To protect yourself and others from cold and flu germs:  · Wash your hands often. Use alcohol-based hand  when you dont have access to soap and water.  · Dont touch your eyes, nose, and mouth. This may help you keep germs out of your body.  · Try to avoid people with respiratory infections. You may want to stay out of crowds during flu season (winter).  · Ask your healthcare provider if you should get a pneumonia vaccination.  How to wash your hands  · Use warm water and plenty of soap. Work up a good lather.  · Clean your whole hand, under your nails, between your fingers, and up your wrists. Wash for at least 15 to 20 seconds. Dont just wipe--rub well.  · Rinse. Let the water run down your fingertips, not up your wrists.  · In a public restroom, use a paper towel to turn off the faucet and open the door.   Date Last Reviewed: 12/1/2016  © 8867-0149 [employer]. [address] [place] [address]. All rights reserved. This information is not intended as a substitute for professional medical care. Always follow your healthcare professional's instructions.      Follow up with your doctor in a few days as needed.  Return to the urgent care or go to the ER if symptoms get worse.    Hubert Espinal MD

## 2018-12-21 NOTE — PATIENT INSTRUCTIONS
Preventing Common Respiratory Infections  Respiratory infections such as colds and influenza (the flu) are common in winter. These infections are often caused by viruses. They may share some symptoms, but not all respiratory infections are the same. Some make you more sick than others. You can take steps to prevent common respiratory infections. And if you get sick, you can take care of yourself to keep the infection from getting worse.    What is a cold?  · Symptoms include runny nose, coughing and sneezing, and sore throat. Cold symptoms tend to be milder than flu symptoms.  · Symptoms tend to come on slowly. They last for a few days to about a week.  · With a cold, you can still do most of the things you usually do.  What is the flu?  · Symptoms include fever, headache, fatigue, cough, sore throat, runny nose, and muscle aches. Children may have upset stomach and vomiting, but adults usually dont.  · Symptoms tend to come on quickly. Some, such as fatigue and cough, can last a few weeks.  · With the flu, you may feel worn out and not able to do normal activities.  · Its most likely NOT the flu if an adult has vomiting or diarrhea for a day or two. This so-called stomach flu is probably a GI (gastrointestinal) infection.  When the infection gets worse  Without proper care, a respiratory infection can get worse. It can lead to serious complications and death. If you arent getting better, call your healthcare provider. Complications can include:  · Bronchitis (infection of the airways that leads to shortness of breath and coughing up thick yellow or green mucus)  · Pneumonia (infection of the lungs in which fluid and mucus settle in the lungs, making breathing difficult)  · Worsening of chronic conditions such as heart failure, chronic lung disease, asthma, or diabetes  · Severe dehydration (loss of fluids)  · Sinus problems  · Ear infections   Get a flu vaccine  A flu vaccine protects you from influenza  (but not other colds or infections). Get a vaccine each fall, before flu season starts. This can be done at a clinic, healthcare providers office, drugstore, senior center, or through your workplace.  Get pneumococcal vaccines  Pneumonia can be a complication of influenza. There are 2 pneumococcal pneumonia vaccines that protect against many types of pneumonia. Talk with your healthcare provider about these important vaccines.   Keep germs from spreading  No one likes getting sick. To protect yourself and others from cold and flu germs:  · Wash your hands often. Use alcohol-based hand  when you dont have access to soap and water.  · Dont touch your eyes, nose, and mouth. This may help you keep germs out of your body.  · Try to avoid people with respiratory infections. You may want to stay out of crowds during flu season (winter).  · Ask your healthcare provider if you should get a pneumonia vaccination.  How to wash your hands  · Use warm water and plenty of soap. Work up a good lather.  · Clean your whole hand, under your nails, between your fingers, and up your wrists. Wash for at least 15 to 20 seconds. Dont just wipe--rub well.  · Rinse. Let the water run down your fingertips, not up your wrists.  · In a public restroom, use a paper towel to turn off the faucet and open the door.   Date Last Reviewed: 12/1/2016  © 7679-3581 [employer]. [address] [place] [address], [place] [address]. All rights reserved. This information is not intended as a substitute for professional medical care. Always follow your healthcare professional's instructions.      Follow up with your doctor in a few days as needed.  Return to the urgent care or go to the ER if symptoms get worse.    Hubert Espinal MD

## 2018-12-27 RX ORDER — ATORVASTATIN CALCIUM 20 MG/1
TABLET, FILM COATED ORAL
Qty: 30 TABLET | Refills: 0 | Status: SHIPPED | OUTPATIENT
Start: 2018-12-27 | End: 2019-01-03 | Stop reason: SDUPTHER

## 2019-01-02 ENCOUNTER — TELEPHONE (OUTPATIENT)
Dept: OBSTETRICS AND GYNECOLOGY | Facility: CLINIC | Age: 59
End: 2019-01-02

## 2019-01-03 RX ORDER — ATORVASTATIN CALCIUM 20 MG/1
20 TABLET, FILM COATED ORAL DAILY
Qty: 30 TABLET | Refills: 0 | Status: SHIPPED | OUTPATIENT
Start: 2019-01-03 | End: 2019-03-14 | Stop reason: SDUPTHER

## 2019-01-03 NOTE — TELEPHONE ENCOUNTER
Previous prescription was signed by covering MD - pt is overdue for annual labs ordered at last annual last year in 7/2018 - please arrange labs asap as last labs are from a year and a half ago - 7/2017 - refill given to allow time to complete labs     - lab orders already in epic - please arrange

## 2019-01-03 NOTE — TELEPHONE ENCOUNTER
spoke w/ pt and informed her of PCP message and also confirmed that refill has been sent in   Pt stated that she will call at a later time to scheduled her 6 month f/u appt

## 2019-01-03 NOTE — TELEPHONE ENCOUNTER
----- Message from Melody Bates sent at 1/3/2019  3:33 PM CST -----  Contact: pt   Name of Who is Calling: JUDY RAMÍREZ [[id number]]       What is the request in detail:   Patient is requesting a call she states her medication (atorvastatin (LIPITOR) 20 MG tablet ) has another doctor prescribing and she needs that to be changed so that the pharmacy can fill the prescription...... Please contact to further discuss and advise.       Can the clinic reply by [facility]: yes       What Number to Call Back [facility]: [phone]

## 2019-01-10 RX ORDER — TRAZODONE HYDROCHLORIDE 50 MG/1
50 TABLET ORAL NIGHTLY
Qty: 30 TABLET | Refills: 5 | Status: SHIPPED | OUTPATIENT
Start: 2019-01-10 | End: 2020-01-28

## 2019-01-14 RX ORDER — ERGOCALCIFEROL 1.25 MG/1
CAPSULE ORAL
Qty: 4 CAPSULE | Refills: 0 | OUTPATIENT
Start: 2019-01-14

## 2019-01-14 NOTE — TELEPHONE ENCOUNTER
Sent message to inform her that the medicaiton refill was refused and ask for day and time to schedule lab work

## 2019-01-16 ENCOUNTER — PATIENT MESSAGE (OUTPATIENT)
Dept: INTERNAL MEDICINE | Facility: CLINIC | Age: 59
End: 2019-01-16

## 2019-01-18 ENCOUNTER — LAB VISIT (OUTPATIENT)
Dept: LAB | Facility: HOSPITAL | Age: 59
End: 2019-01-18
Attending: INTERNAL MEDICINE
Payer: COMMERCIAL

## 2019-01-18 DIAGNOSIS — Z00.00 ANNUAL PHYSICAL EXAM: ICD-10-CM

## 2019-01-18 DIAGNOSIS — E55.9 VITAMIN D DEFICIENCY: ICD-10-CM

## 2019-01-18 LAB — 25(OH)D3+25(OH)D2 SERPL-MCNC: 16 NG/ML

## 2019-01-18 PROCEDURE — 82306 VITAMIN D 25 HYDROXY: CPT

## 2019-01-18 PROCEDURE — 36415 COLL VENOUS BLD VENIPUNCTURE: CPT | Mod: PO

## 2019-01-21 ENCOUNTER — TELEPHONE (OUTPATIENT)
Dept: INTERNAL MEDICINE | Facility: CLINIC | Age: 59
End: 2019-01-21

## 2019-01-21 ENCOUNTER — LAB VISIT (OUTPATIENT)
Dept: LAB | Facility: HOSPITAL | Age: 59
End: 2019-01-21
Attending: INTERNAL MEDICINE
Payer: COMMERCIAL

## 2019-01-21 DIAGNOSIS — Z00.00 ANNUAL PHYSICAL EXAM: ICD-10-CM

## 2019-01-21 DIAGNOSIS — E55.9 VITAMIN D DEFICIENCY: Primary | ICD-10-CM

## 2019-01-21 LAB
CHOLEST SERPL-MCNC: 227 MG/DL
CHOLEST/HDLC SERPL: 3.8 {RATIO}
HDLC SERPL-MCNC: 59 MG/DL
HDLC SERPL: 26 %
LDLC SERPL CALC-MCNC: 131.8 MG/DL
NONHDLC SERPL-MCNC: 168 MG/DL
TRIGL SERPL-MCNC: 181 MG/DL

## 2019-01-21 PROCEDURE — 80061 LIPID PANEL: CPT

## 2019-01-21 PROCEDURE — 36415 COLL VENOUS BLD VENIPUNCTURE: CPT | Mod: PO

## 2019-01-21 RX ORDER — ERGOCALCIFEROL 1.25 MG/1
50000 CAPSULE ORAL
Qty: 12 CAPSULE | Refills: 0 | Status: SHIPPED | OUTPATIENT
Start: 2019-01-21 | End: 2019-07-16

## 2019-01-21 RX ORDER — ACETAMINOPHEN 500 MG
1 TABLET ORAL DAILY
COMMUNITY
Start: 2019-01-21

## 2019-01-21 NOTE — TELEPHONE ENCOUNTER
Message sent to pt via my chart with lab results and updates to plan.       Please arrange vit d level in 3 months

## 2019-01-21 NOTE — TELEPHONE ENCOUNTER
Please arrange vit d level in 12 weeks     Flp only returned today - All other annual labs do not appear to be pending?? Please check with lab and if not drawn today, please arrange cmp, cbc, a1c, tsh and apologize for me for the lab error

## 2019-01-22 ENCOUNTER — PATIENT MESSAGE (OUTPATIENT)
Dept: INTERNAL MEDICINE | Facility: CLINIC | Age: 59
End: 2019-01-22

## 2019-03-14 RX ORDER — ATORVASTATIN CALCIUM 20 MG/1
TABLET, FILM COATED ORAL
Qty: 90 TABLET | Refills: 0 | Status: SHIPPED | OUTPATIENT
Start: 2019-03-14 | End: 2019-07-16 | Stop reason: SDUPTHER

## 2019-03-29 RX ORDER — VALACYCLOVIR HYDROCHLORIDE 1 G/1
TABLET, FILM COATED ORAL
Qty: 30 TABLET | Refills: 3 | Status: SHIPPED | OUTPATIENT
Start: 2019-03-29 | End: 2021-04-21 | Stop reason: SDUPTHER

## 2019-03-29 NOTE — TELEPHONE ENCOUNTER
Sent message to inform pt that script was sent to the pharmacy.  Sent in valtrex      I am assuming she was referring to zovirax which is acyclovir - only rec valtrex at this time   - I also reviewed her chart and cannot find why she is taking this - please inquire if it is for cold sores or genital herpes outbreaks  - thanks!

## 2019-03-29 NOTE — TELEPHONE ENCOUNTER
Sent in valtrex     I am assuming she was referring to zovirax which is acyclovir - only rec valtrex at this time   - I also reviewed her chart and cannot find why she is taking this - please inquire if it is for cold sores or genital herpes outbreaks  - thanks!

## 2019-03-29 NOTE — TELEPHONE ENCOUNTER
----- Message from Nakia Ndiaye sent at 3/29/2019 10:13 AM CDT -----  Contact: pt    Name of Who is Calling:JUDY RAMÍREZ [[id number]]    What is the request in detail: Patient states she would like a prescription for   Valtrex and zozarax sent to [facility] ([place] - [place] - [address]    Can the clinic reply by [facility]: no    What Number to Call Back if not in [facility]: [phone]

## 2019-04-01 ENCOUNTER — PATIENT MESSAGE (OUTPATIENT)
Dept: INTERNAL MEDICINE | Facility: CLINIC | Age: 59
End: 2019-04-01

## 2019-04-11 ENCOUNTER — LAB VISIT (OUTPATIENT)
Dept: LAB | Facility: HOSPITAL | Age: 59
End: 2019-04-11
Attending: INTERNAL MEDICINE
Payer: COMMERCIAL

## 2019-04-11 DIAGNOSIS — E55.9 VITAMIN D DEFICIENCY: ICD-10-CM

## 2019-04-11 LAB — 25(OH)D3+25(OH)D2 SERPL-MCNC: 55 NG/ML (ref 30–96)

## 2019-04-11 PROCEDURE — 82306 VITAMIN D 25 HYDROXY: CPT

## 2019-04-11 PROCEDURE — 36415 COLL VENOUS BLD VENIPUNCTURE: CPT | Mod: PO

## 2019-07-10 ENCOUNTER — TELEPHONE (OUTPATIENT)
Dept: INTERNAL MEDICINE | Facility: CLINIC | Age: 59
End: 2019-07-10

## 2019-07-10 NOTE — TELEPHONE ENCOUNTER
----- Message from Chon Yip sent at 7/10/2019 11:08 AM CDT -----  Contact: JUDY RAMÍREZ [[id number]]  Type:  Patient Returning Call    Who Called: JUDY RAMÍREZ [[id number]]    Who Left Message for Patient: Sho    Does the patient know what this is regarding?: yes    Would the patient rather a call back or a response via [facility]? call    Best Call Back Number: [phone]    Additional Information: Would like to reschedule sooner then August

## 2019-07-10 NOTE — TELEPHONE ENCOUNTER
----- Message from Chon Yip sent at 7/10/2019 11:08 AM CDT -----  Contact: JUDY RAMÍREZ [[id number]]  Type:  Patient Returning Call    Who Called: JUDY RAMÍREZ [[id number]]    Who Left Message for Patient: Sho    Does the patient know what this is regarding?: yes    Would the patient rather a call back or a response via [facility]? call    Best Call Back Number: [phone]    Additional Information: Would like to reschedule sooner then August

## 2019-07-11 ENCOUNTER — PATIENT MESSAGE (OUTPATIENT)
Dept: INTERNAL MEDICINE | Facility: CLINIC | Age: 59
End: 2019-07-11

## 2019-07-16 ENCOUNTER — OFFICE VISIT (OUTPATIENT)
Dept: INTERNAL MEDICINE | Facility: CLINIC | Age: 59
End: 2019-07-16
Attending: INTERNAL MEDICINE
Payer: COMMERCIAL

## 2019-07-16 VITALS
BODY MASS INDEX: 27.44 KG/M2 | SYSTOLIC BLOOD PRESSURE: 140 MMHG | OXYGEN SATURATION: 98 % | HEIGHT: 67 IN | WEIGHT: 174.81 LBS | DIASTOLIC BLOOD PRESSURE: 80 MMHG | HEART RATE: 85 BPM

## 2019-07-16 DIAGNOSIS — E78.5 HYPERLIPIDEMIA, UNSPECIFIED HYPERLIPIDEMIA TYPE: ICD-10-CM

## 2019-07-16 DIAGNOSIS — I65.29 CAROTID ATHEROSCLEROSIS, UNSPECIFIED LATERALITY: ICD-10-CM

## 2019-07-16 DIAGNOSIS — M85.89 OSTEOPENIA OF MULTIPLE SITES: ICD-10-CM

## 2019-07-16 DIAGNOSIS — K21.9 GASTROESOPHAGEAL REFLUX DISEASE, ESOPHAGITIS PRESENCE NOT SPECIFIED: ICD-10-CM

## 2019-07-16 DIAGNOSIS — Z98.890 HISTORY OF PARATHYROID SURGERY: ICD-10-CM

## 2019-07-16 DIAGNOSIS — F41.1 GAD (GENERALIZED ANXIETY DISORDER): ICD-10-CM

## 2019-07-16 DIAGNOSIS — Z00.00 ANNUAL PHYSICAL EXAM: Primary | ICD-10-CM

## 2019-07-16 DIAGNOSIS — E55.9 VITAMIN D DEFICIENCY: ICD-10-CM

## 2019-07-16 DIAGNOSIS — I10 HTN (HYPERTENSION), BENIGN: ICD-10-CM

## 2019-07-16 PROBLEM — E21.0 PRIMARY HYPERPARATHYROIDISM: Status: RESOLVED | Noted: 2017-09-18 | Resolved: 2019-07-16

## 2019-07-16 PROCEDURE — 3079F DIAST BP 80-89 MM HG: CPT | Mod: CPTII,S$GLB,, | Performed by: INTERNAL MEDICINE

## 2019-07-16 PROCEDURE — 99396 PR PREVENTIVE VISIT,EST,40-64: ICD-10-PCS | Mod: S$GLB,,, | Performed by: INTERNAL MEDICINE

## 2019-07-16 PROCEDURE — 3077F PR MOST RECENT SYSTOLIC BLOOD PRESSURE >= 140 MM HG: ICD-10-PCS | Mod: CPTII,S$GLB,, | Performed by: INTERNAL MEDICINE

## 2019-07-16 PROCEDURE — 99999 PR PBB SHADOW E&M-EST. PATIENT-LVL IV: ICD-10-PCS | Mod: PBBFAC,,, | Performed by: INTERNAL MEDICINE

## 2019-07-16 PROCEDURE — 99999 PR PBB SHADOW E&M-EST. PATIENT-LVL IV: CPT | Mod: PBBFAC,,, | Performed by: INTERNAL MEDICINE

## 2019-07-16 PROCEDURE — 99396 PREV VISIT EST AGE 40-64: CPT | Mod: S$GLB,,, | Performed by: INTERNAL MEDICINE

## 2019-07-16 PROCEDURE — 3077F SYST BP >= 140 MM HG: CPT | Mod: CPTII,S$GLB,, | Performed by: INTERNAL MEDICINE

## 2019-07-16 PROCEDURE — 3079F PR MOST RECENT DIASTOLIC BLOOD PRESSURE 80-89 MM HG: ICD-10-PCS | Mod: CPTII,S$GLB,, | Performed by: INTERNAL MEDICINE

## 2019-07-16 RX ORDER — ATORVASTATIN CALCIUM 20 MG/1
20 TABLET, FILM COATED ORAL DAILY
Qty: 90 TABLET | Refills: 3 | Status: SHIPPED | OUTPATIENT
Start: 2019-07-16 | End: 2020-06-03 | Stop reason: ALTCHOICE

## 2019-07-16 RX ORDER — BENZONATATE 200 MG/1
CAPSULE ORAL
Refills: 0 | COMMUNITY
Start: 2019-06-04 | End: 2019-07-16

## 2019-07-16 RX ORDER — GUAIFENESIN AND PHENYLEPHRINE HCL 385; 10 MG/1; MG/1
TABLET ORAL
Refills: 0 | COMMUNITY
Start: 2019-06-04 | End: 2019-07-16

## 2019-07-16 RX ORDER — CLARITHROMYCIN 500 MG/1
TABLET, FILM COATED ORAL
Refills: 0 | COMMUNITY
Start: 2019-06-04 | End: 2019-07-16

## 2019-07-16 RX ORDER — PROMETHAZINE HYDROCHLORIDE AND CODEINE PHOSPHATE 6.25; 1 MG/5ML; MG/5ML
SOLUTION ORAL
Refills: 0 | COMMUNITY
Start: 2019-06-04 | End: 2019-07-16

## 2019-07-16 RX ORDER — ESCITALOPRAM OXALATE 20 MG/1
20 TABLET ORAL DAILY
Qty: 90 TABLET | Refills: 3 | Status: SHIPPED | OUTPATIENT
Start: 2019-07-16 | End: 2020-10-21

## 2019-07-16 RX ORDER — VALSARTAN 160 MG/1
160 TABLET ORAL EVERY MORNING
Qty: 90 TABLET | Refills: 3 | Status: SHIPPED | OUTPATIENT
Start: 2019-07-16 | End: 2020-06-02

## 2019-07-16 RX ORDER — AMLODIPINE BESYLATE 5 MG/1
5 TABLET ORAL DAILY
Qty: 90 TABLET | Refills: 3 | Status: SHIPPED | OUTPATIENT
Start: 2019-07-16 | End: 2020-07-16

## 2019-07-30 ENCOUNTER — HOSPITAL ENCOUNTER (OUTPATIENT)
Dept: RADIOLOGY | Facility: OTHER | Age: 59
Discharge: HOME | End: 2019-07-30
Attending: INTERNAL MEDICINE
Payer: COMMERCIAL

## 2019-07-30 ENCOUNTER — CLINICAL SUPPORT (OUTPATIENT)
Dept: INTERNAL MEDICINE | Facility: CLINIC | Age: 59
End: 2019-07-30
Payer: COMMERCIAL

## 2019-07-30 VITALS — OXYGEN SATURATION: 97 % | DIASTOLIC BLOOD PRESSURE: 68 MMHG | SYSTOLIC BLOOD PRESSURE: 130 MMHG | HEART RATE: 87 BPM

## 2019-07-30 DIAGNOSIS — M85.89 OSTEOPENIA OF MULTIPLE SITES: ICD-10-CM

## 2019-07-30 DIAGNOSIS — I65.29 CAROTID ATHEROSCLEROSIS, UNSPECIFIED LATERALITY: ICD-10-CM

## 2019-07-30 PROCEDURE — 93880 EXTRACRANIAL BILAT STUDY: CPT | Mod: 26,,, | Performed by: RADIOLOGY

## 2019-07-30 PROCEDURE — 77080 DXA BONE DENSITY AXIAL: CPT | Mod: TC

## 2019-07-30 PROCEDURE — 77080 DXA BONE DENSITY AXIAL: CPT | Mod: 26,,, | Performed by: RADIOLOGY

## 2019-07-30 PROCEDURE — 99999 PR PBB SHADOW E&M-EST. PATIENT-LVL II: CPT | Mod: PBBFAC,,,

## 2019-07-30 PROCEDURE — 99999 PR PBB SHADOW E&M-EST. PATIENT-LVL II: ICD-10-PCS | Mod: PBBFAC,,,

## 2019-07-30 PROCEDURE — 93880 US CAROTID BILATERAL: ICD-10-PCS | Mod: 26,,, | Performed by: RADIOLOGY

## 2019-07-30 PROCEDURE — 77080 DEXA BONE DENSITY SPINE HIP: ICD-10-PCS | Mod: 26,,, | Performed by: RADIOLOGY

## 2019-07-30 PROCEDURE — 93880 EXTRACRANIAL BILAT STUDY: CPT | Mod: TC

## 2019-07-30 NOTE — PROGRESS NOTES
Odilia Winslow 59 y.o. female is here today for Blood Pressure check.   History of HTN yes.    Review of patient's allergies indicates:  No Known Allergies  Creatinine   Date Value Ref Range Status   04/30/2018 0.8 0.5 - 1.4 mg/dL Final     Sodium   Date Value Ref Range Status   04/30/2018 137 136 - 145 mmol/L Final     Potassium   Date Value Ref Range Status   04/30/2018 4.2 3.5 - 5.1 mmol/L Final   ]  Patient verifies taking blood pressure medications on a regular bases at the same time of the day.     Current Outpatient Medications:     amLODIPine (NORVASC) 5 MG tablet, Take 1 tablet (5 mg total) by mouth once daily., Disp: 90 tablet, Rfl: 3    atorvastatin (LIPITOR) 20 MG tablet, Take 1 tablet (20 mg total) by mouth once daily., Disp: 90 tablet, Rfl: 3    cholecalciferol, vitamin D3, (VITAMIN D3) 2,000 unit Cap, Take 1 capsule (2,000 Units total) by mouth once daily., Disp: , Rfl:     escitalopram oxalate (LEXAPRO) 20 MG tablet, Take 1 tablet (20 mg total) by mouth once daily., Disp: 90 tablet, Rfl: 3    methen-edgarSandrablue-s.phos-phsal-hyo 118-10-40.8-36 mg Cap, Take 1 capsule by mouth every 6 (six) hours as needed., Disp: 10 capsule, Rfl: 3    progesterone (PROMETRIUM) 100 MG capsule, Take 1 capsule (100 mg total) by mouth once daily., Disp: 90 capsule, Rfl: 3    traZODone (DESYREL) 50 MG tablet, Take 1 tablet (50 mg total) by mouth every evening., Disp: 30 tablet, Rfl: 5    valACYclovir (VALTREX) 1000 MG tablet, TAKE (1/2) HALF BY MOUTH EVERY 12 HOURS PRN, Disp: 30 tablet, Rfl: 3    valsartan (DIOVAN) 160 MG tablet, Take 1 tablet (160 mg total) by mouth every morning., Disp: 90 tablet, Rfl: 3  Does patient have record of home blood pressure readings yes. Readings have been averaging 140/80.   Last dose of blood pressure medication was taken at 600 pm 7/29/19.  Patient is asymptomatic.       BP: 130/68 , Pulse: 87.      Dr. Izaguirre notified.   Pt comes in for bp check bp is within normal range

## 2019-08-01 ENCOUNTER — TELEPHONE (OUTPATIENT)
Dept: INTERNAL MEDICINE | Facility: CLINIC | Age: 59
End: 2019-08-01

## 2019-08-01 NOTE — TELEPHONE ENCOUNTER
Blood pressure improved - rec cont current medications and continue home monitoring - rec check blood pressure at least 2 hours after taking medication and recommend resting at least 5 minutes prior to checking blood pressure

## 2019-08-01 NOTE — TELEPHONE ENCOUNTER
Ms Winslow this is Dr. Izaguirre's recommendation:  Blood pressure improved - rec cont current medications and continue home monitoring - rec check blood pressure at least 2 hours after taking medication and recommend resting at least 5 minutes prior to checking blood pressure

## 2019-11-02 DIAGNOSIS — Z78.0 MENOPAUSE: ICD-10-CM

## 2019-11-04 RX ORDER — PROGESTERONE 100 MG/1
CAPSULE ORAL
Qty: 90 CAPSULE | Refills: 3 | Status: SHIPPED | OUTPATIENT
Start: 2019-11-04 | End: 2020-11-02

## 2020-01-28 RX ORDER — TRAZODONE HYDROCHLORIDE 50 MG/1
TABLET ORAL
Qty: 30 TABLET | Refills: 0 | Status: SHIPPED | OUTPATIENT
Start: 2020-01-28 | End: 2020-03-24

## 2020-03-24 RX ORDER — TRAZODONE HYDROCHLORIDE 50 MG/1
TABLET ORAL
Qty: 30 TABLET | Refills: 0 | Status: SHIPPED | OUTPATIENT
Start: 2020-03-24 | End: 2020-05-22

## 2020-05-22 RX ORDER — TRAZODONE HYDROCHLORIDE 50 MG/1
TABLET ORAL
Qty: 30 TABLET | Refills: 0 | Status: SHIPPED | OUTPATIENT
Start: 2020-05-22 | End: 2020-12-23 | Stop reason: SDUPTHER

## 2020-06-01 DIAGNOSIS — I10 HTN (HYPERTENSION), BENIGN: Primary | ICD-10-CM

## 2020-06-02 ENCOUNTER — TELEPHONE (OUTPATIENT)
Dept: INTERNAL MEDICINE | Facility: CLINIC | Age: 60
End: 2020-06-02

## 2020-06-02 RX ORDER — VALSARTAN 320 MG/1
320 TABLET ORAL EVERY MORNING
Qty: 90 TABLET | Refills: 3 | Status: SHIPPED | OUTPATIENT
Start: 2020-06-02 | End: 2021-04-22

## 2020-06-03 ENCOUNTER — OFFICE VISIT (OUTPATIENT)
Dept: INTERNAL MEDICINE | Facility: CLINIC | Age: 60
End: 2020-06-03
Payer: COMMERCIAL

## 2020-06-03 VITALS
BODY MASS INDEX: 27.31 KG/M2 | DIASTOLIC BLOOD PRESSURE: 95 MMHG | WEIGHT: 174 LBS | SYSTOLIC BLOOD PRESSURE: 142 MMHG | HEIGHT: 67 IN | HEART RATE: 81 BPM

## 2020-06-03 DIAGNOSIS — I10 HTN (HYPERTENSION), BENIGN: Primary | ICD-10-CM

## 2020-06-03 PROCEDURE — 3077F PR MOST RECENT SYSTOLIC BLOOD PRESSURE >= 140 MM HG: ICD-10-PCS | Mod: CPTII,,, | Performed by: FAMILY MEDICINE

## 2020-06-03 PROCEDURE — 3008F PR BODY MASS INDEX (BMI) DOCUMENTED: ICD-10-PCS | Mod: CPTII,,, | Performed by: FAMILY MEDICINE

## 2020-06-03 PROCEDURE — 3077F SYST BP >= 140 MM HG: CPT | Mod: CPTII,,, | Performed by: FAMILY MEDICINE

## 2020-06-03 PROCEDURE — 99213 PR OFFICE/OUTPT VISIT, EST, LEVL III, 20-29 MIN: ICD-10-PCS | Mod: 95,,, | Performed by: FAMILY MEDICINE

## 2020-06-03 PROCEDURE — 3080F DIAST BP >= 90 MM HG: CPT | Mod: CPTII,,, | Performed by: FAMILY MEDICINE

## 2020-06-03 PROCEDURE — 99213 OFFICE O/P EST LOW 20 MIN: CPT | Mod: 95,,, | Performed by: FAMILY MEDICINE

## 2020-06-03 PROCEDURE — 3008F BODY MASS INDEX DOCD: CPT | Mod: CPTII,,, | Performed by: FAMILY MEDICINE

## 2020-06-03 PROCEDURE — 3080F PR MOST RECENT DIASTOLIC BLOOD PRESSURE >= 90 MM HG: ICD-10-PCS | Mod: CPTII,,, | Performed by: FAMILY MEDICINE

## 2020-06-03 RX ORDER — ATORVASTATIN CALCIUM 40 MG/1
40 TABLET, FILM COATED ORAL DAILY
COMMUNITY
End: 2020-07-06 | Stop reason: SDUPTHER

## 2020-06-03 NOTE — PATIENT INSTRUCTIONS
Controlling High Blood Pressure  High blood pressure (hypertension) is often called the silent killer. This is because many people who have it dont know it. High blood pressure is defined as 140/90 mm Hg or higher. Know your blood pressure and remember to check it regularly. Doing so can save your life. Here are some things you can do to help control your blood pressure.    Choose heart-healthy foods  · Select low-salt, low-fat foods. Limit sodium intake to 2,400 mg per day or the amount suggested by your healthcare provider.  · Limit canned, dried, cured, packaged, and fast foods. These can contain a lot of salt.  · Eat 8 to 10 servings of fruits and vegetables every day.  · Choose lean meats, fish, or chicken.  · Eat whole-grain pasta, brown rice, and beans.  · Eat 2 to 3 servings of low-fat or fat-free dairy products.  · Ask your doctor about the DASH eating plan. This plan helps reduce blood pressure.  · When you go to a restaurant, ask that your meal be prepared with no added salt.  Maintain a healthy weight  · Ask your healthcare provider how many calories to eat a day. Then stick to that number.  · Ask your healthcare provider what weight range is healthiest for you. If you are overweight, a weight loss of only 3% to 5% of your body weight can help lower blood pressure. Generally, a good weight loss goal is to lose 10% of your body weight in a year.  · Limit snacks and sweets.  · Get regular exercise.  Get up and get active  · Choose activities you enjoy. Find ones you can do with friends or family. This includes bicycling, dancing, walking, and jogging.  · Park farther away from building entrances.  · Use stairs instead of the elevator.  · When you can, walk or bike instead of driving.  · [place] leaves, garden, or do household repairs.  · Be active at a moderate to vigorous level of physical activity for at least 40 minutes for a minimum of 3 to 4 days a week.   Manage stress  · Make time to relax and enjoy  life. Find time to laugh.  · Communicate your concerns with your loved ones and your healthcare provider.  · Visit with family and friends, and keep up with hobbies.  Limit alcohol and quit smoking  · Men should have no more than 2 drinks per day.  · Women should have no more than 1 drink per day.  · Talk with your healthcare provider about quitting smoking. Smoking significantly increases your risk for heart disease and stroke. Ask your healthcare provider about community smoking cessation programs and other options.  Medicines  If lifestyle changes arent enough, your healthcare provider may prescribe high blood pressure medicine. Take all medicines as prescribed. If you have any questions about your medicines, ask your healthcare provider before stopping or changing them.   Date Last Reviewed: 4/27/2016  © 8171-3693 [employer]. [address] [place] [address], [place] [address]. All rights reserved. This information is not intended as a substitute for professional medical care. Always follow your healthcare professional's instructions.

## 2020-06-03 NOTE — PROGRESS NOTES
"[facility] - Family Medicine/ Internal Medicine  Clinic Note      Subjective:       Patient ID: Odilia Winslow is a 60 y.o. female.    Chief Complaint: No chief complaint on file.    Patient calls in today for an ER follow-up visit.  She was recently seen at an outside hospital for evaluation of high blood pressure and chest pain, and notes her workup was normal, including a negative stress test.  Notes are not available for review.  She referred to Cardiology and will be seen by Dr. Mejia on July 10th.  She notes her pressures have been under better control but do remain elevated today.  She has increased her dose of valsartan under the direction of Dr. Izaguirre up to 320 mg.  She continues on amlodipine 5 mg.    Hypertension   This is a chronic problem. The problem has been gradually improving since onset. The problem is uncontrolled. Pertinent negatives include no anxiety, chest pain (resolved), headaches, malaise/fatigue, palpitations, peripheral edema or shortness of breath.     Review of Systems   Constitutional: Negative for fever and malaise/fatigue.   Respiratory: Negative for cough and shortness of breath.    Cardiovascular: Negative for chest pain (resolved) and palpitations.   Neurological: Negative for dizziness, light-headedness and headaches.       Objective:      BP (!) 142/95   Pulse 81   Ht 5' 7" (1.702 m)   Wt 78.9 kg (174 lb)   LMP 07/26/2016   BMI 27.25 kg/m²   Physical Exam   Constitutional: She is oriented to person, place, and time. She appears well-developed and well-nourished.  Non-toxic appearance. She does not have a sickly appearance. She does not appear ill. No distress.   Limited exam and vitals today due to virtual visit   Eyes: Conjunctivae are normal.   Pulmonary/Chest: Effort normal. No respiratory distress.   Neurological: She is alert and oriented to person, place, and time.   Psychiatric: She has a normal mood and affect. Her speech is normal " and behavior is normal. Thought content normal.       Assessment:       1. HTN (hypertension), benign        Plan:     1. HTN (hypertension), benign  - history and exam findings reviewed, was recently seen at [facility] for BP and cardiac workup was normal/negative, including stress test  - pressures remain elevated at home, but much improved, and symptoms have nearly resolved  - diet and exercise lifestyle modifications reviewed and recommended  - goal BP reviewed, monitor clinical response, have recommended to resume checking BP at home and bring in home cuff for RN visit to verify accuracy  - treatment options reviewed, we discussed to continue valsartan 320 mg daily for the next 1-2 days, if pressures remain elevated then the next step would be to increase her amlodipine, patient expressed understanding of this approach and agreement  - questions answered, warning signs reviewed, patient is comfortable with this plan and was encouraged to call the office for any concerns or worsening of condition     The patient location is: at home in [place]  The chief complaint leading to consultation is: ER follow-up    Visit type: audiovisual    Face to Face time with patient: 18 minutes of total time spent on the encounter, which includes face to face time and non-face to face time preparing to see the patient (eg, review of tests), Obtaining and/or reviewing separately obtained history, Documenting clinical information in the electronic or other health record, Independently interpreting results (not separately reported) and communicating results to the patient/family/caregiver, or Care coordination (not separately reported).         Each patient to whom he or she provides medical services by telemedicine is:  (1) informed of the relationship between the physician and patient and the respective role of any other health care provider with respect to management of the patient; and (2) notified that he or she may  decline to receive medical services by telemedicine and may withdraw from such care at any time.    Notes:     - Follow up in about 2 weeks (around 6/17/2020) for nurse visit for BP check.     Ross Lopes MD  6/3/2020          Medication List with Changes/Refills   Current Medications    AMLODIPINE (NORVASC) 5 MG TABLET    Take 1 tablet (5 mg total) by mouth once daily.    ATORVASTATIN (LIPITOR) 40 MG TABLET    Take 40 mg by mouth once daily.    CHOLECALCIFEROL, VITAMIN D3, (VITAMIN D3) 2,000 UNIT CAP    Take 1 capsule (2,000 Units total) by mouth once daily.    ESCITALOPRAM OXALATE (LEXAPRO) 20 MG TABLET    Take 1 tablet (20 mg total) by mouth once daily.    METHEN-M.BLUE-S.[facility] 118-10-40.8-36 MG CAP    Take 1 capsule by mouth every 6 (six) hours as needed.    PROGESTERONE (PROMETRIUM) 100 MG CAPSULE    TAKE ONE DAILY    TRAZODONE (DESYREL) 50 MG TABLET    TAKE ONE BY MOUTH EVERY EVENING    VALACYCLOVIR (VALTREX) 1000 MG TABLET    TAKE (1/2) HALF BY MOUTH EVERY 12 HOURS PRN    VALSARTAN (DIOVAN) 320 MG TABLET    Take 1 tablet (320 mg total) by mouth every morning.   Discontinued Medications    ATORVASTATIN (LIPITOR) 20 MG TABLET    Take 1 tablet (20 mg total) by mouth once daily.

## 2020-06-04 ENCOUNTER — CLINICAL SUPPORT (OUTPATIENT)
Dept: INTERNAL MEDICINE | Facility: CLINIC | Age: 60
End: 2020-06-04
Payer: COMMERCIAL

## 2020-06-04 ENCOUNTER — TELEPHONE (OUTPATIENT)
Dept: INTERNAL MEDICINE | Facility: CLINIC | Age: 60
End: 2020-06-04

## 2020-06-04 NOTE — PROGRESS NOTES
Odilia Winslow 60 y.o. female is here today for Blood Pressure check.   Diagnosed with Hypertension yes.      Patient took blood pressure medication today no.  Last dose of blood pressure medication was taken on 06/03/2020 at 6:00 pm. Patient took Amlodipine 5 mg & Valsartan 320 mg. Patient take all medications at night time.      Current Outpatient Medications:     amLODIPine (NORVASC) 5 MG tablet, Take 1 tablet (5 mg total) by mouth once daily., Disp: 90 tablet, Rfl: 3    atorvastatin (LIPITOR) 40 MG tablet, Take 40 mg by mouth once daily., Disp: , Rfl:     cholecalciferol, vitamin D3, (VITAMIN D3) 2,000 unit Cap, Take 1 capsule (2,000 Units total) by mouth once daily., Disp: , Rfl:     escitalopram oxalate (LEXAPRO) 20 MG tablet, Take 1 tablet (20 mg total) by mouth once daily., Disp: 90 tablet, Rfl: 3    methen-m.blues.[facility]-phsalhyo 118-10-40.8-36 mg Cap, Take 1 capsule by mouth every 6 (six) hours as needed., Disp: 10 capsule, Rfl: 3    progesterone (PROMETRIUM) 100 MG capsule, TAKE ONE DAILY, Disp: 90 capsule, Rfl: 3    traZODone (DESYREL) 50 MG tablet, TAKE ONE BY MOUTH EVERY EVENING, Disp: 30 tablet, Rfl: 0    valACYclovir (VALTREX) 1000 MG tablet, TAKE (1/2) HALF BY MOUTH EVERY 12 HOURS PRN, Disp: 30 tablet, Rfl: 3    valsartan (DIOVAN) 320 MG tablet, Take 1 tablet (320 mg total) by mouth every morning., Disp: 90 tablet, Rfl: 3    Does the pt have any complaints today in regards to their blood pressure medication? no.     Pt's Home blood pressure machine read 140/78, Pulse 80.     Manual Blood pressure reading was 130/74, Pulse 80.     Pt's Home blood pressure machine is not accurate.    Does patient have record of home blood pressure readings / Blood Pressure Log no.     Follow up date is 06/18/2020 two week nurse visit.     Dr. Ross Lopes notified.

## 2020-06-04 NOTE — TELEPHONE ENCOUNTER
Please let the patient know that her blood pressure looked good today, so we can continue the current regimen of amlodipine 5 mg and valsartan 320 mg for now.  Please also let her know that her home cuff was not accurate, so it might be a good idea to look into getting a new one.  I don't have any recommendation for any specific brand/model.  Thanks.

## 2020-06-04 NOTE — TELEPHONE ENCOUNTER
Odilia Winslow 60 y.o. female is here today for Blood Pressure check.   Diagnosed with Hypertension yes.      Patient took blood pressure medication today no.  Last dose of blood pressure medication was taken on 06/03/2020 at 6:00 pm. Patient took Amlodipine 5 mg & Valsartan 320 mg. Patient take all medications at night time.      Pt's Home blood pressure machine read 140/78, Pulse 80.     Manual Blood pressure reading was 130/74, Pulse 80.     Pt's Home blood pressure machine is not accurate.    Does patient have record of home blood pressure readings / Blood Pressure Log no.     Follow up date is 06/18/2020 two week nurse visit.

## 2020-06-18 ENCOUNTER — TELEPHONE (OUTPATIENT)
Dept: INTERNAL MEDICINE | Facility: CLINIC | Age: 60
End: 2020-06-18

## 2020-06-18 ENCOUNTER — CLINICAL SUPPORT (OUTPATIENT)
Dept: INTERNAL MEDICINE | Facility: CLINIC | Age: 60
End: 2020-06-18
Payer: COMMERCIAL

## 2020-06-18 NOTE — PROGRESS NOTES
Odilia Winslow 60 y.o. female is here today for Blood Pressure check.   Diagnosed with Hypertension yes.      Patient took blood pressure medication today no.  Last dose of blood pressure medication was taken at 6 pm last night. Patient took amlodipine 5 mg and valsartan 320 mg.       Current Outpatient Medications:     amLODIPine (NORVASC) 5 MG tablet, Take 1 tablet (5 mg total) by mouth once daily., Disp: 90 tablet, Rfl: 3    atorvastatin (LIPITOR) 40 MG tablet, Take 40 mg by mouth once daily., Disp: , Rfl:     cholecalciferol, vitamin D3, (VITAMIN D3) 2,000 unit Cap, Take 1 capsule (2,000 Units total) by mouth once daily., Disp: , Rfl:     escitalopram oxalate (LEXAPRO) 20 MG tablet, Take 1 tablet (20 mg total) by mouth once daily., Disp: 90 tablet, Rfl: 3    wilverSandrablue-sSandraAbrazo Arrowhead Campuss-phsal-hyo 118-10-40.8-36 mg Cap, Take 1 capsule by mouth every 6 (six) hours as needed., Disp: 10 capsule, Rfl: 3    progesterone (PROMETRIUM) 100 MG capsule, TAKE ONE DAILY, Disp: 90 capsule, Rfl: 3    traZODone (DESYREL) 50 MG tablet, TAKE ONE BY MOUTH EVERY EVENING, Disp: 30 tablet, Rfl: 0    valACYclovir (VALTREX) 1000 MG tablet, TAKE (1/2) HALF BY MOUTH EVERY 12 HOURS PRN, Disp: 30 tablet, Rfl: 3    valsartan (DIOVAN) 320 MG tablet, Take 1 tablet (320 mg total) by mouth every morning., Disp: 90 tablet, Rfl: 3    Does the pt have any complaints today in regards to their blood pressure medication? no.     Manual Blood pressure reading was  138/72, Pulse 77.     Patient does not have BP cuff or log to check.     Follow up date is not scheduled.     Dr. Lopes notified.

## 2020-06-18 NOTE — TELEPHONE ENCOUNTER
BP reviewed, is controlled today.  Assuming her BP has been similar at home, we can continue her current regimen without any changes.  Please contact the office if you have any additional questions or concerns.  Thanks!

## 2020-06-18 NOTE — TELEPHONE ENCOUNTER
Odilia Winslow 60 y.o. female is here today for Blood Pressure check.   Diagnosed with Hypertension yes.    Patient took blood pressure medication today no.  Last dose of blood pressure medication was taken at 6 pm last night. Patient took amlodipine 5 mg and valsartan 320 mg.     Does the pt have any complaints today in regards to their blood pressure medication? no.     Manual Blood pressure reading was  138/72, Pulse 77.     Patient does not have BP cuff or log to check.     Follow up date is not scheduled.     Dr. Lopes notified.

## 2020-06-19 NOTE — TELEPHONE ENCOUNTER
Spoke with pt and informed her of the results of her blood pressure check. Informed the pt she can continue her current medication regimen. Pt verbalized understanding.

## 2020-06-25 ENCOUNTER — TELEPHONE (OUTPATIENT)
Dept: INTERNAL MEDICINE | Facility: CLINIC | Age: 60
End: 2020-06-25

## 2020-09-21 ENCOUNTER — TELEPHONE (OUTPATIENT)
Dept: INTERNAL MEDICINE | Facility: CLINIC | Age: 60
End: 2020-09-21

## 2020-09-21 NOTE — TELEPHONE ENCOUNTER
Contacted regarding last home B/P reading. Left voice message.  Abdirashid Knapp LPN Care Coordinator

## 2020-09-25 ENCOUNTER — PATIENT MESSAGE (OUTPATIENT)
Dept: OTHER | Facility: OTHER | Age: 60
End: 2020-09-25

## 2020-09-29 ENCOUNTER — TELEPHONE (OUTPATIENT)
Dept: INTERNAL MEDICINE | Facility: CLINIC | Age: 60
End: 2020-09-29

## 2020-09-29 NOTE — TELEPHONE ENCOUNTER
Contacted regarding last home B/P reading. Left voice message, also sent my chart message via pt portal.  Abdirashid Knapp LPN Care Coordinator

## 2020-10-20 DIAGNOSIS — E78.5 HYPERLIPIDEMIA, UNSPECIFIED HYPERLIPIDEMIA TYPE: ICD-10-CM

## 2020-10-20 RX ORDER — ATORVASTATIN CALCIUM 40 MG/1
40 TABLET, FILM COATED ORAL DAILY
Qty: 90 TABLET | Refills: 0 | Status: SHIPPED | OUTPATIENT
Start: 2020-10-20 | End: 2021-02-18

## 2020-10-20 NOTE — PROGRESS NOTES
Refill Authorization Note   Odilia Winslow is requesting a refill authorization.  Brief assessment and rationale for refill: Approve    -Medication-related problems identified:   Requires labs  Requires appointment  Medication Therapy Plan: CDMR. appt(annual); labs(CMP, LIPIDS, CBC)    Medication reconciliation completed: No   Comments:   Orders Placed This Encounter    atorvastatin (LIPITOR) 40 MG tablet      Requested Prescriptions   Signed Prescriptions Disp Refills    atorvastatin (LIPITOR) 40 MG tablet 90 tablet 0     Sig: Take 1 tablet (40 mg total) by mouth once daily.       Cardiovascular:  Antilipid - Statins Failed - 10/20/2020  7:32 AM        Failed - ALT is 94 or below and within 360 days     ALT   Date Value Ref Range Status   11/09/2017 49 (H) 10 - 44 U/L Final   08/09/2017 87 (H) 10 - 44 U/L Final   07/19/2017 68 (H) 10 - 44 U/L Final              Failed - AST is 54 or below and within 360 days     AST   Date Value Ref Range Status   11/09/2017 40 10 - 40 U/L Final   08/09/2017 63 (H) 10 - 40 U/L Final   07/19/2017 48 (H) 10 - 40 U/L Final              Failed - Total Cholesterol within 360 days     Cholesterol   Date Value Ref Range Status   01/21/2019 227 (H) 120 - 199 mg/dL Final     Comment:     The National Cholesterol Education Program (NCEP) has set the  following guidelines (reference ranges) for Cholesterol:  Optimal.....................<200 mg/dL  Borderline High.............200-239 mg/dL  High........................> or = 240 mg/dL     07/19/2017 257 (H) 120 - 199 mg/dL Final     Comment:     The National Cholesterol Education Program (NCEP) has set the  following guidelines (reference ranges) for Cholesterol:  Optimal.....................<200 mg/dL  Borderline High.............200-239 mg/dL  High........................> or = 240 mg/dL     05/04/2016 258 (H) 120 - 199 mg/dL Final     Comment:     The National Cholesterol Education Program (NCEP) has set the  following guidelines  (reference ranges) for Cholesterol:  Optimal.....................<200 mg/dL  Borderline High.............200-239 mg/dL  High........................> or = 240 mg/dL                Failed - LDL within 360 days     LDL Cholesterol   Date Value Ref Range Status   01/21/2019 131.8 63.0 - 159.0 mg/dL Final     Comment:     The National Cholesterol Education Program (NCEP) has set the  following guidelines (reference values) for LDL Cholesterol:  Optimal.......................<130 mg/dL  Borderline High...............130-159 mg/dL  High..........................160-189 mg/dL  Very High.....................>190 mg/dL              Failed - HDL within 360 days     HDL   Date Value Ref Range Status   01/21/2019 59 40 - 75 mg/dL Final     Comment:     The National Cholesterol Education Program (NCEP) has set the  following guidelines (reference values) for HDL Cholesterol:  Low...............<40 mg/dL  Optimal...........>60 mg/dL              Failed - Triglycerides within 360 days     Triglycerides   Date Value Ref Range Status   01/21/2019 181 (H) 30 - 150 mg/dL Final     Comment:     The National Cholesterol Education Program (NCEP) has set the  following guidelines (reference values) for triglycerides:  Normal......................<150 mg/dL  Borderline High.............150-199 mg/dL  High........................200-499 mg/dL     07/19/2017 124 30 - 150 mg/dL Final     Comment:     The National Cholesterol Education Program (NCEP) has set the  following guidelines (reference values) for triglycerides:  Normal......................<150 mg/dL  Borderline High.............150-199 mg/dL  High........................200-499 mg/dL     05/04/2016 191 (H) 30 - 150 mg/dL Final     Comment:     The National Cholesterol Education Program (NCEP) has set the  following guidelines (reference values) for triglycerides:  Normal......................<150 mg/dL  Borderline High.............150-199 mg/dL  High........................200-499  mg/dL                Passed - Patient is at least 18 years old        Passed - Office Visit within last 12 months or future 90 days.     Recent Outpatient Visits            4 months ago HTN (hypertension), benign    [facility] Ross Lopes MD    1 year ago Annual physical exam    [facility] [address] Meredith Izaguirre MD    1 year ago Upper respiratory tract infection, unspecified type    [facility] Hubert Espinal MD    1 year ago Encounter for gynecological examination    [facility] Odilia Perez MD    2 years ago [facility] physical exam    [facility] [address] Meredith Izaguirre MD                        Appointments  past 12m or future 3m with PCP    Date Provider   Last Visit   7/16/2019 Meredith Izaguirre MD   Next Visit   Visit date not found Meredith Izaguirre MD   ED visits in past 90 days: 0     Note composed:2:11 PM 10/20/2020

## 2020-10-20 NOTE — TELEPHONE ENCOUNTER
Provider Staff:     Action is required for this patient.     Please schedule patient for Annual and Labs (CMP, LIPIDS, CBC)    Thanks!  [facility]     Appointments  past 12m or future 3m with PCP    Date Provider   Last Visit   7/16/2019 Meredith Izaguirre MD   Next Visit   Visit date not found Meredith Izaguirre MD     Note composed:2:11 PM 10/20/2020

## 2020-10-20 NOTE — TELEPHONE ENCOUNTER
Care Due:                  Date            Visit Type   Department     Provider  --------------------------------------------------------------------------------                                PHYSICAL -                              ESTABLISHED   [facility] INTERNAL  Last Visit: 07-      PATIENT      MEDICINE       Meredith Izaguirre  Next Visit: None Scheduled  None         None Found                                                            Last  Test          Frequency    Reason                     Performed    Due Date  --------------------------------------------------------------------------------    Office Visit  12 months..  amLODIPine, atorvastatin,   07-   07-                             escitalopram,                             valACYclovir, valsartan..    ALT.........  12 months..  atorvastatin.............  Not Found    Overdue    AST.........  12 months..  atorvastatin.............  Not Found    Overdue    Cr..........  12 months..  valACYclovir, valsartan..  Not Found    Overdue    HDL.........  12 months..  atorvastatin.............  01- 01-    K...........  12 months..  valsartan................  Not Found    Overdue    LDL.........  12 months..  atorvastatin.............  01- 01-    Total         12 months..  atorvastatin.............  01- 01-  Cholesterol.    Triglyceride  12 months..  atorvastatin.............  01- 01-  s...........    WBC.........  12 months..  valACYclovir.............  Not Found    Overdue    Powered by [employer]. Reference number: [id number]. 10/20/2020 7:33:25 AM   CDT

## 2020-10-21 ENCOUNTER — PATIENT MESSAGE (OUTPATIENT)
Dept: INTERNAL MEDICINE | Facility: CLINIC | Age: 60
End: 2020-10-21

## 2020-10-21 DIAGNOSIS — F41.1 GAD (GENERALIZED ANXIETY DISORDER): Primary | ICD-10-CM

## 2020-10-21 RX ORDER — ESCITALOPRAM OXALATE 20 MG/1
20 TABLET ORAL DAILY
Qty: 90 TABLET | Refills: 0 | Status: SHIPPED | OUTPATIENT
Start: 2020-10-21 | End: 2021-02-18

## 2020-10-21 NOTE — TELEPHONE ENCOUNTER
No new care gaps identified.  Powered by [employer]. Reference number: [id number]. 10/21/2020 3:32:40 PM   CDT

## 2020-11-06 NOTE — TELEPHONE ENCOUNTER
No new care gaps identified.  Powered by [employer]. Reference number: [id number]. 11/06/2020 5:08:58 PM   CST

## 2020-11-09 RX ORDER — AMLODIPINE BESYLATE 5 MG/1
TABLET ORAL
Qty: 90 TABLET | Refills: 0 | Status: SHIPPED | OUTPATIENT
Start: 2020-11-09 | End: 2020-12-23

## 2020-11-09 NOTE — PROGRESS NOTES
Refill Authorization Note   Odilia Winslow is requesting a refill authorization.  Brief assessment and rationale for refill: Approve     Medication Therapy Plan: [email or web address]     Medication reconciliation completed: No   Comments:       Requested Prescriptions   Pending Prescriptions Disp Refills    amLODIPine (NORVASC) 5 MG tablet [Pharmacy Med Name: AMLODIPINE 5MG TAB 5 Tablet] 90 tablet 0     Sig: TAKE ONE DAILY       Calcium-Channel Blockers Protocol Failed - 11/9/2020  9:36 AM        Failed - Blood Pressure below 139/89 on file in past 12 months      BP Readings from Last 3 Encounters:   06/03/20 (!) 142/95   07/30/19 130/68   07/16/19 (!) 140/80             Passed - Patient is not currently pregnant        Passed - No positive pregnancy test in past 12 months         Passed - Visit with authorizing provider in past 12 months or upcoming 90 days        Cardiovascular:  Calcium Channel Blockers Failed - 11/9/2020  9:36 AM        Failed - Last BP in normal range within 360 days.     BP Readings from Last 3 Encounters:   06/03/20 (!) 142/95   07/30/19 130/68   07/16/19 (!) 140/80              Passed - Patient is at least 18 years old        Passed - Office visit in past 12 months or future 90 days     Recent Outpatient Visits            5 months ago HTN (hypertension), benign    [facility] Ross Lopes MD    1 year ago Annual physical exam    [facility] [address] Meredith Izaguirre MD    1 year ago Upper respiratory tract infection, unspecified type    [facility] Hubert Espinal MD    2 years ago Encounter for gynecological examination    [facility] Odilia Perez MD    2 years ago Annual physical exam    [facility] [address] Meredith Izaguirre MD          Future Appointments              In 1 month Meredith Izaguirre MD [facility] [address]                    Appointments  past  12m or future 3m with PCP    Date Provider   Last Visit   7/16/2019 Meredith Izaguirre MD   Next Visit   12/23/2020 Meredith Izaguirre MD   ED visits in past 90 days: 0     Note composed:9:36 AM 11/09/2020

## 2020-12-23 ENCOUNTER — OFFICE VISIT (OUTPATIENT)
Dept: INTERNAL MEDICINE | Facility: CLINIC | Age: 60
End: 2020-12-23
Attending: INTERNAL MEDICINE
Payer: COMMERCIAL

## 2020-12-23 VITALS
BODY MASS INDEX: 27.27 KG/M2 | SYSTOLIC BLOOD PRESSURE: 150 MMHG | HEART RATE: 83 BPM | HEIGHT: 67 IN | OXYGEN SATURATION: 98 % | WEIGHT: 173.75 LBS | DIASTOLIC BLOOD PRESSURE: 81 MMHG

## 2020-12-23 DIAGNOSIS — Z00.00 ANNUAL PHYSICAL EXAM: Primary | ICD-10-CM

## 2020-12-23 DIAGNOSIS — Z98.890 HISTORY OF PARATHYROID SURGERY: ICD-10-CM

## 2020-12-23 DIAGNOSIS — M85.89 OSTEOPENIA OF MULTIPLE SITES: ICD-10-CM

## 2020-12-23 DIAGNOSIS — Z12.31 ENCOUNTER FOR SCREENING MAMMOGRAM FOR MALIGNANT NEOPLASM OF BREAST: ICD-10-CM

## 2020-12-23 DIAGNOSIS — E78.5 HYPERLIPIDEMIA, UNSPECIFIED HYPERLIPIDEMIA TYPE: ICD-10-CM

## 2020-12-23 DIAGNOSIS — E55.9 VITAMIN D DEFICIENCY: ICD-10-CM

## 2020-12-23 DIAGNOSIS — F41.1 GAD (GENERALIZED ANXIETY DISORDER): ICD-10-CM

## 2020-12-23 DIAGNOSIS — Z11.4 SCREENING FOR HIV (HUMAN IMMUNODEFICIENCY VIRUS): ICD-10-CM

## 2020-12-23 DIAGNOSIS — I10 ESSENTIAL HYPERTENSION: ICD-10-CM

## 2020-12-23 PROCEDURE — 99999 PR PBB SHADOW E&M-EST. PATIENT-LVL IV: ICD-10-PCS | Mod: PBBFAC,,, | Performed by: INTERNAL MEDICINE

## 2020-12-23 PROCEDURE — 1126F AMNT PAIN NOTED NONE PRSNT: CPT | Mod: S$GLB,,, | Performed by: INTERNAL MEDICINE

## 2020-12-23 PROCEDURE — 3079F DIAST BP 80-89 MM HG: CPT | Mod: CPTII,S$GLB,, | Performed by: INTERNAL MEDICINE

## 2020-12-23 PROCEDURE — 3008F PR BODY MASS INDEX (BMI) DOCUMENTED: ICD-10-PCS | Mod: CPTII,S$GLB,, | Performed by: INTERNAL MEDICINE

## 2020-12-23 PROCEDURE — 1126F PR PAIN SEVERITY QUANTIFIED, NO PAIN PRESENT: ICD-10-PCS | Mod: S$GLB,,, | Performed by: INTERNAL MEDICINE

## 2020-12-23 PROCEDURE — 3008F BODY MASS INDEX DOCD: CPT | Mod: CPTII,S$GLB,, | Performed by: INTERNAL MEDICINE

## 2020-12-23 PROCEDURE — 99396 PR PREVENTIVE VISIT,EST,40-64: ICD-10-PCS | Mod: S$GLB,,, | Performed by: INTERNAL MEDICINE

## 2020-12-23 PROCEDURE — 99396 PREV VISIT EST AGE 40-64: CPT | Mod: S$GLB,,, | Performed by: INTERNAL MEDICINE

## 2020-12-23 PROCEDURE — 3077F SYST BP >= 140 MM HG: CPT | Mod: CPTII,S$GLB,, | Performed by: INTERNAL MEDICINE

## 2020-12-23 PROCEDURE — 3079F PR MOST RECENT DIASTOLIC BLOOD PRESSURE 80-89 MM HG: ICD-10-PCS | Mod: CPTII,S$GLB,, | Performed by: INTERNAL MEDICINE

## 2020-12-23 PROCEDURE — 3077F PR MOST RECENT SYSTOLIC BLOOD PRESSURE >= 140 MM HG: ICD-10-PCS | Mod: CPTII,S$GLB,, | Performed by: INTERNAL MEDICINE

## 2020-12-23 PROCEDURE — 99999 PR PBB SHADOW E&M-EST. PATIENT-LVL IV: CPT | Mod: PBBFAC,,, | Performed by: INTERNAL MEDICINE

## 2020-12-23 RX ORDER — AMLODIPINE BESYLATE 10 MG/1
10 TABLET ORAL DAILY
Qty: 30 TABLET | Refills: 1 | Status: SHIPPED | OUTPATIENT
Start: 2020-12-23 | End: 2021-01-10

## 2020-12-23 RX ORDER — TRAZODONE HYDROCHLORIDE 50 MG/1
50 TABLET ORAL NIGHTLY
Qty: 90 TABLET | Refills: 1 | Status: SHIPPED | OUTPATIENT
Start: 2020-12-23 | End: 2022-09-01 | Stop reason: SDUPTHER

## 2020-12-23 NOTE — PROGRESS NOTES
"Subjective:   Patient ID: Odilia Winslow is a 60 y.o. female  Chief complaint:   Chief Complaint   Patient presents with    Annual Exam       HPI  Here for annual exam     Has been exercising and maintaining weight    Seen by zhou after presented to [facility] with htn and cp 6/2020  - had neg stress test     parathyroidectomy and calcium and pth wnl  - followed by endocrine     Vit d def:   - has not been taking vit d supplement   - prev taking vit d and level improved - Exercising reg      GERD:  - given ppi by GI specialist for hx of gerd and esophageal stricture s/p dilatation  - started taking otc prilosec which is effective when needed   - avoiding food triggers   - no dysphagia     HTN:   - took amlodipine 5mg and valsartan 320mg  -  Above goal    HLD: taking lipitor daily - tolerating     Anxiety:   - controlled on ssri   - tried counseling to help with coping strategies for stressors above and too $$   No depression, si/hi/ford    Review of Systems      Objective:  Vitals:    12/23/20 1435 12/23/20 1501   BP: (!) 168/90 (!) 150/81   BP Location: Left arm    Patient Position: Sitting    Pulse: 83    SpO2: 98%    Weight: 78.8 kg (173 lb 11.6 oz)    Height: 5' 7" (1.702 m)      Body mass index is 27.21 kg/m².    Physical Exam  Vitals signs reviewed.   Constitutional:       Appearance: Normal appearance. She is well-developed.   HENT:      Head: Normocephalic and atraumatic.      Right Ear: Tympanic membrane, ear canal and external ear normal.      Left Ear: Tympanic membrane, ear canal and external ear normal.      Nose:      Comments: Wearing mask   Eyes:      Extraocular Movements: Extraocular movements intact.      Conjunctiva/sclera: Conjunctivae normal.   Neck:      Musculoskeletal: Neck supple.      Thyroid: No thyromegaly.   Cardiovascular:      Rate and Rhythm: Normal rate and regular rhythm.      Pulses: Normal pulses.      Heart sounds: Normal heart sounds.   Pulmonary:      Effort: " Pulmonary effort is normal.      Breath sounds: Normal breath sounds.   Abdominal:      General: Bowel sounds are normal.      Palpations: Abdomen is soft.   Musculoskeletal:         General: No swelling or tenderness.   Lymphadenopathy:      Cervical: No cervical adenopathy.   Skin:     General: Skin is warm and dry.      Capillary Refill: Capillary refill takes less than 2 seconds.   Neurological:      General: No focal deficit present.      Mental Status: She is alert and oriented to person, place, and time. Mental status is at baseline.   Psychiatric:         Behavior: Behavior normal.         Thought Content: Thought content normal.         Assessment:  1. Annual physical exam    2. Encounter for screening mammogram for malignant neoplasm of breast    3. Screening for HIV (human immunodeficiency virus)    4. Essential hypertension    5. Hyperlipidemia, unspecified hyperlipidemia type    6. CLAY (generalized anxiety disorder)    7. History of parathyroid surgery    8. Vitamin D deficiency    9. Osteopenia of multiple sites        Plan:  Odilia was seen today for annual exam.    bp above goal today   rtc in 2 weeks for bp check    Inc CCB if still above goal at [place]  If above goal will stop amlodipine and start nifedipine 90mg   will conside increasing wellbutrin next if needed vs switching to zoloft     Recommend daily sunscreen, cardiovascular exercise min 30 min 5 days per week. Seatbelts routinely.    Health Maintenance   Topic Date Due    Aspirin/Antiplatelet Therapy  03/25/1976    Mammogram  12/12/2020    High Dose Statin  12/28/2021    Pap Smear with HPV Cotest  10/25/2023    Lipid Panel  01/21/2024    TETANUS VACCINE  05/04/2026    Hepatitis C Screening  Completed

## 2021-01-04 ENCOUNTER — PATIENT MESSAGE (OUTPATIENT)
Dept: ADMINISTRATIVE | Facility: HOSPITAL | Age: 61
End: 2021-01-04

## 2021-01-08 ENCOUNTER — CLINICAL SUPPORT (OUTPATIENT)
Dept: INTERNAL MEDICINE | Facility: CLINIC | Age: 61
End: 2021-01-08
Payer: COMMERCIAL

## 2021-01-08 ENCOUNTER — TELEPHONE (OUTPATIENT)
Dept: INTERNAL MEDICINE | Facility: CLINIC | Age: 61
End: 2021-01-08

## 2021-01-08 VITALS — OXYGEN SATURATION: 98 % | DIASTOLIC BLOOD PRESSURE: 72 MMHG | HEART RATE: 78 BPM | SYSTOLIC BLOOD PRESSURE: 132 MMHG

## 2021-01-08 PROCEDURE — 99999 PR PBB SHADOW E&M-EST. PATIENT-LVL I: CPT | Mod: PBBFAC,,,

## 2021-01-08 PROCEDURE — 99999 PR PBB SHADOW E&M-EST. PATIENT-LVL I: ICD-10-PCS | Mod: PBBFAC,,,

## 2021-01-10 ENCOUNTER — CLINICAL SUPPORT (OUTPATIENT)
Dept: URGENT CARE | Facility: CLINIC | Age: 61
End: 2021-01-10
Payer: COMMERCIAL

## 2021-01-10 DIAGNOSIS — Z13.9 ENCOUNTER FOR SCREENING: Primary | ICD-10-CM

## 2021-01-10 LAB
CTP QC/QA: YES
SARS-COV-2 RDRP RESP QL NAA+PROBE: NEGATIVE

## 2021-01-10 PROCEDURE — U0002 COVID-19 LAB TEST NON-CDC: HCPCS | Mod: QW,S$GLB,, | Performed by: NURSE PRACTITIONER

## 2021-01-10 PROCEDURE — U0002: ICD-10-PCS | Mod: QW,S$GLB,, | Performed by: NURSE PRACTITIONER

## 2021-01-10 RX ORDER — NIFEDIPINE 90 MG/1
90 TABLET, EXTENDED RELEASE ORAL DAILY
Qty: 30 TABLET | Refills: 1 | Status: SHIPPED | OUTPATIENT
Start: 2021-01-10 | End: 2021-03-11

## 2021-02-15 DIAGNOSIS — E78.5 HYPERLIPIDEMIA, UNSPECIFIED HYPERLIPIDEMIA TYPE: ICD-10-CM

## 2021-02-15 DIAGNOSIS — I10 HTN (HYPERTENSION), BENIGN: Primary | ICD-10-CM

## 2021-02-15 DIAGNOSIS — E78.5 DYSLIPIDEMIA: ICD-10-CM

## 2021-02-17 DIAGNOSIS — F41.1 GAD (GENERALIZED ANXIETY DISORDER): ICD-10-CM

## 2021-02-18 RX ORDER — ATORVASTATIN CALCIUM 40 MG/1
TABLET, FILM COATED ORAL
Qty: 90 TABLET | Refills: 0 | Status: SHIPPED | OUTPATIENT
Start: 2021-02-18 | End: 2021-06-06

## 2021-02-18 RX ORDER — ESCITALOPRAM OXALATE 20 MG/1
TABLET ORAL
Qty: 90 TABLET | Refills: 3 | Status: SHIPPED | OUTPATIENT
Start: 2021-02-18 | End: 2022-05-03

## 2021-03-11 ENCOUNTER — PATIENT MESSAGE (OUTPATIENT)
Dept: INTERNAL MEDICINE | Facility: CLINIC | Age: 61
End: 2021-03-11

## 2021-03-11 DIAGNOSIS — I10 ESSENTIAL HYPERTENSION: Primary | ICD-10-CM

## 2021-03-11 RX ORDER — AMLODIPINE BESYLATE 10 MG/1
10 TABLET ORAL DAILY
COMMUNITY
Start: 2021-01-27 | End: 2021-03-11 | Stop reason: SDUPTHER

## 2021-03-11 RX ORDER — AMLODIPINE BESYLATE 10 MG/1
10 TABLET ORAL DAILY
Qty: 90 TABLET | Refills: 0 | Status: SHIPPED | OUTPATIENT
Start: 2021-03-11 | End: 2021-04-14

## 2021-03-12 ENCOUNTER — TELEPHONE (OUTPATIENT)
Dept: INTERNAL MEDICINE | Facility: CLINIC | Age: 61
End: 2021-03-12

## 2021-04-05 ENCOUNTER — PATIENT MESSAGE (OUTPATIENT)
Dept: ADMINISTRATIVE | Facility: HOSPITAL | Age: 61
End: 2021-04-05

## 2021-04-12 DIAGNOSIS — I10 ESSENTIAL HYPERTENSION: ICD-10-CM

## 2021-04-14 RX ORDER — AMLODIPINE BESYLATE 10 MG/1
10 TABLET ORAL DAILY
Qty: 90 TABLET | Refills: 0 | Status: SHIPPED | OUTPATIENT
Start: 2021-04-14 | End: 2021-06-24

## 2021-05-10 ENCOUNTER — PATIENT MESSAGE (OUTPATIENT)
Dept: OBSTETRICS AND GYNECOLOGY | Facility: CLINIC | Age: 61
End: 2021-05-10

## 2021-05-10 DIAGNOSIS — Z78.0 MENOPAUSE: ICD-10-CM

## 2021-05-10 RX ORDER — PROGESTERONE 100 MG/1
CAPSULE ORAL
Qty: 90 CAPSULE | Refills: 0 | Status: SHIPPED | OUTPATIENT
Start: 2021-05-10 | End: 2021-10-12

## 2021-06-03 DIAGNOSIS — E78.5 HYPERLIPIDEMIA, UNSPECIFIED HYPERLIPIDEMIA TYPE: ICD-10-CM

## 2021-06-07 RX ORDER — ATORVASTATIN CALCIUM 40 MG/1
TABLET, FILM COATED ORAL
Qty: 30 TABLET | Refills: 0 | Status: SHIPPED | OUTPATIENT
Start: 2021-06-07 | End: 2021-06-17

## 2021-06-08 ENCOUNTER — LAB VISIT (OUTPATIENT)
Dept: LAB | Facility: HOSPITAL | Age: 61
End: 2021-06-08
Attending: INTERNAL MEDICINE
Payer: COMMERCIAL

## 2021-06-08 DIAGNOSIS — I10 HTN (HYPERTENSION), BENIGN: ICD-10-CM

## 2021-06-08 DIAGNOSIS — E78.5 HYPERLIPIDEMIA, UNSPECIFIED HYPERLIPIDEMIA TYPE: ICD-10-CM

## 2021-06-08 DIAGNOSIS — Z11.4 SCREENING FOR HIV (HUMAN IMMUNODEFICIENCY VIRUS): ICD-10-CM

## 2021-06-08 DIAGNOSIS — E21.3 HYPERPARATHYROIDISM: ICD-10-CM

## 2021-06-08 DIAGNOSIS — Z00.00 ANNUAL PHYSICAL EXAM: ICD-10-CM

## 2021-06-08 DIAGNOSIS — E55.9 VITAMIN D DEFICIENCY: ICD-10-CM

## 2021-06-08 LAB
25(OH)D3+25(OH)D2 SERPL-MCNC: 16 NG/ML (ref 30–96)
ERYTHROCYTE [DISTWIDTH] IN BLOOD BY AUTOMATED COUNT: 13.2 % (ref 11.5–14.5)
HCT VFR BLD AUTO: 40.8 % (ref 37–48.5)
HGB BLD-MCNC: 13.4 G/DL (ref 12–16)
MCH RBC QN AUTO: 31.6 PG (ref 27–31)
MCHC RBC AUTO-ENTMCNC: 32.8 G/DL (ref 32–36)
MCV RBC AUTO: 96 FL (ref 82–98)
PLATELET # BLD AUTO: 292 K/UL (ref 150–450)
PMV BLD AUTO: 9.5 FL (ref 9.2–12.9)
PTH-INTACT SERPL-MCNC: 57 PG/ML (ref 9–77)
RBC # BLD AUTO: 4.24 M/UL (ref 4–5.4)
WBC # BLD AUTO: 5.13 K/UL (ref 3.9–12.7)

## 2021-06-08 PROCEDURE — 85027 COMPLETE CBC AUTOMATED: CPT | Performed by: INTERNAL MEDICINE

## 2021-06-08 PROCEDURE — 36415 COLL VENOUS BLD VENIPUNCTURE: CPT | Mod: PO | Performed by: INTERNAL MEDICINE

## 2021-06-08 PROCEDURE — 82306 VITAMIN D 25 HYDROXY: CPT | Performed by: INTERNAL MEDICINE

## 2021-06-08 PROCEDURE — 83970 ASSAY OF PARATHORMONE: CPT | Performed by: INTERNAL MEDICINE

## 2021-06-08 PROCEDURE — 80061 LIPID PANEL: CPT | Performed by: INTERNAL MEDICINE

## 2021-06-08 PROCEDURE — 84443 ASSAY THYROID STIM HORMONE: CPT | Performed by: INTERNAL MEDICINE

## 2021-06-08 PROCEDURE — 80053 COMPREHEN METABOLIC PANEL: CPT | Performed by: INTERNAL MEDICINE

## 2021-06-08 PROCEDURE — 86703 HIV-1/HIV-2 1 RESULT ANTBDY: CPT | Performed by: INTERNAL MEDICINE

## 2021-06-09 LAB
ALBUMIN SERPL BCP-MCNC: 4.1 G/DL (ref 3.5–5.2)
ALP SERPL-CCNC: 70 U/L (ref 55–135)
ALT SERPL W/O P-5'-P-CCNC: 21 U/L (ref 10–44)
ANION GAP SERPL CALC-SCNC: 11 MMOL/L (ref 8–16)
AST SERPL-CCNC: 24 U/L (ref 10–40)
BILIRUB SERPL-MCNC: 0.6 MG/DL (ref 0.1–1)
BUN SERPL-MCNC: 10 MG/DL (ref 8–23)
CALCIUM SERPL-MCNC: 9.7 MG/DL (ref 8.7–10.5)
CHLORIDE SERPL-SCNC: 105 MMOL/L (ref 95–110)
CHOLEST SERPL-MCNC: 238 MG/DL (ref 120–199)
CHOLEST/HDLC SERPL: 3.2 {RATIO} (ref 2–5)
CO2 SERPL-SCNC: 21 MMOL/L (ref 23–29)
CREAT SERPL-MCNC: 0.8 MG/DL (ref 0.5–1.4)
EST. GFR  (AFRICAN AMERICAN): >60 ML/MIN/1.73 M^2
EST. GFR  (NON AFRICAN AMERICAN): >60 ML/MIN/1.73 M^2
GLUCOSE SERPL-MCNC: 104 MG/DL (ref 70–110)
HDLC SERPL-MCNC: 75 MG/DL (ref 40–75)
HDLC SERPL: 31.5 % (ref 20–50)
HIV 1+2 AB+HIV1 P24 AG SERPL QL IA: NEGATIVE
LDLC SERPL CALC-MCNC: 142.2 MG/DL (ref 63–159)
NONHDLC SERPL-MCNC: 163 MG/DL
POTASSIUM SERPL-SCNC: 4.1 MMOL/L (ref 3.5–5.1)
PROT SERPL-MCNC: 7.5 G/DL (ref 6–8.4)
SODIUM SERPL-SCNC: 137 MMOL/L (ref 136–145)
TRIGL SERPL-MCNC: 104 MG/DL (ref 30–150)
TSH SERPL DL<=0.005 MIU/L-ACNC: 0.89 UIU/ML (ref 0.4–4)

## 2021-06-18 ENCOUNTER — LAB VISIT (OUTPATIENT)
Dept: LAB | Facility: HOSPITAL | Age: 61
End: 2021-06-18
Attending: OBSTETRICS & GYNECOLOGY
Payer: COMMERCIAL

## 2021-06-18 ENCOUNTER — PATIENT MESSAGE (OUTPATIENT)
Dept: OBSTETRICS AND GYNECOLOGY | Facility: CLINIC | Age: 61
End: 2021-06-18

## 2021-06-18 DIAGNOSIS — R35.0 URINARY FREQUENCY: ICD-10-CM

## 2021-06-18 DIAGNOSIS — R30.0 DYSURIA: ICD-10-CM

## 2021-06-18 DIAGNOSIS — R30.0 DYSURIA: Primary | ICD-10-CM

## 2021-06-18 PROCEDURE — 87186 SC STD MICRODIL/AGAR DIL: CPT | Performed by: OBSTETRICS & GYNECOLOGY

## 2021-06-18 PROCEDURE — 87088 URINE BACTERIA CULTURE: CPT | Performed by: OBSTETRICS & GYNECOLOGY

## 2021-06-18 PROCEDURE — 87077 CULTURE AEROBIC IDENTIFY: CPT | Performed by: OBSTETRICS & GYNECOLOGY

## 2021-06-18 PROCEDURE — 87086 URINE CULTURE/COLONY COUNT: CPT | Performed by: OBSTETRICS & GYNECOLOGY

## 2021-06-18 RX ORDER — NITROFURANTOIN 25; 75 MG/1; MG/1
100 CAPSULE ORAL 2 TIMES DAILY
Qty: 14 CAPSULE | Refills: 0 | Status: SHIPPED | OUTPATIENT
Start: 2021-06-18 | End: 2021-06-25

## 2021-06-21 LAB — BACTERIA UR CULT: ABNORMAL

## 2021-06-24 DIAGNOSIS — I10 ESSENTIAL HYPERTENSION: ICD-10-CM

## 2021-06-24 RX ORDER — AMLODIPINE BESYLATE 10 MG/1
10 TABLET ORAL DAILY
Qty: 90 TABLET | Refills: 1 | Status: SHIPPED | OUTPATIENT
Start: 2021-06-24 | End: 2021-12-17

## 2021-07-09 ENCOUNTER — PATIENT MESSAGE (OUTPATIENT)
Dept: OBSTETRICS AND GYNECOLOGY | Facility: CLINIC | Age: 61
End: 2021-07-09

## 2021-07-09 DIAGNOSIS — R39.89 SUSPECTED UTI: Primary | ICD-10-CM

## 2021-07-09 RX ORDER — NITROFURANTOIN 25; 75 MG/1; MG/1
100 CAPSULE ORAL 2 TIMES DAILY
Qty: 14 CAPSULE | Refills: 0 | Status: SHIPPED | OUTPATIENT
Start: 2021-07-09 | End: 2021-08-13

## 2021-08-10 ENCOUNTER — PATIENT MESSAGE (OUTPATIENT)
Dept: INTERNAL MEDICINE | Facility: CLINIC | Age: 61
End: 2021-08-10

## 2021-10-03 ENCOUNTER — OFFICE VISIT (OUTPATIENT)
Dept: URGENT CARE | Facility: CLINIC | Age: 61
End: 2021-10-03
Payer: COMMERCIAL

## 2021-10-03 VITALS
HEART RATE: 87 BPM | RESPIRATION RATE: 18 BRPM | DIASTOLIC BLOOD PRESSURE: 69 MMHG | TEMPERATURE: 99 F | SYSTOLIC BLOOD PRESSURE: 127 MMHG | WEIGHT: 173 LBS | OXYGEN SATURATION: 99 % | BODY MASS INDEX: 27.15 KG/M2 | HEIGHT: 67 IN

## 2021-10-03 DIAGNOSIS — M62.838 NECK MUSCLE SPASM: ICD-10-CM

## 2021-10-03 DIAGNOSIS — M54.2 NECK PAIN: Primary | ICD-10-CM

## 2021-10-03 PROCEDURE — 3074F SYST BP LT 130 MM HG: CPT | Mod: CPTII,S$GLB,, | Performed by: FAMILY MEDICINE

## 2021-10-03 PROCEDURE — 3008F BODY MASS INDEX DOCD: CPT | Mod: CPTII,S$GLB,, | Performed by: FAMILY MEDICINE

## 2021-10-03 PROCEDURE — 3078F PR MOST RECENT DIASTOLIC BLOOD PRESSURE < 80 MM HG: ICD-10-PCS | Mod: CPTII,S$GLB,, | Performed by: FAMILY MEDICINE

## 2021-10-03 PROCEDURE — 3008F PR BODY MASS INDEX (BMI) DOCUMENTED: ICD-10-PCS | Mod: CPTII,S$GLB,, | Performed by: FAMILY MEDICINE

## 2021-10-03 PROCEDURE — 96372 PR INJECTION,THERAP/PROPH/DIAG2ST, IM OR SUBCUT: ICD-10-PCS | Mod: S$GLB,,, | Performed by: FAMILY MEDICINE

## 2021-10-03 PROCEDURE — 1159F PR MEDICATION LIST DOCUMENTED IN MEDICAL RECORD: ICD-10-PCS | Mod: CPTII,S$GLB,, | Performed by: FAMILY MEDICINE

## 2021-10-03 PROCEDURE — 3078F DIAST BP <80 MM HG: CPT | Mod: CPTII,S$GLB,, | Performed by: FAMILY MEDICINE

## 2021-10-03 PROCEDURE — 99214 OFFICE O/P EST MOD 30 MIN: CPT | Mod: 25,S$GLB,, | Performed by: FAMILY MEDICINE

## 2021-10-03 PROCEDURE — 1160F PR REVIEW ALL MEDS BY PRESCRIBER/CLIN PHARMACIST DOCUMENTED: ICD-10-PCS | Mod: CPTII,S$GLB,, | Performed by: FAMILY MEDICINE

## 2021-10-03 PROCEDURE — 99214 PR OFFICE/OUTPT VISIT, EST, LEVL IV, 30-39 MIN: ICD-10-PCS | Mod: 25,S$GLB,, | Performed by: FAMILY MEDICINE

## 2021-10-03 PROCEDURE — 4010F ACE/ARB THERAPY RXD/TAKEN: CPT | Mod: CPTII,S$GLB,, | Performed by: FAMILY MEDICINE

## 2021-10-03 PROCEDURE — 1160F RVW MEDS BY RX/DR IN RCRD: CPT | Mod: CPTII,S$GLB,, | Performed by: FAMILY MEDICINE

## 2021-10-03 PROCEDURE — 3074F PR MOST RECENT SYSTOLIC BLOOD PRESSURE < 130 MM HG: ICD-10-PCS | Mod: CPTII,S$GLB,, | Performed by: FAMILY MEDICINE

## 2021-10-03 PROCEDURE — 1159F MED LIST DOCD IN RCRD: CPT | Mod: CPTII,S$GLB,, | Performed by: FAMILY MEDICINE

## 2021-10-03 PROCEDURE — 96372 THER/PROPH/DIAG INJ SC/IM: CPT | Mod: S$GLB,,, | Performed by: FAMILY MEDICINE

## 2021-10-03 PROCEDURE — 4010F PR ACE/ARB THEARPY RXD/TAKEN: ICD-10-PCS | Mod: CPTII,S$GLB,, | Performed by: FAMILY MEDICINE

## 2021-10-03 RX ORDER — KETOROLAC TROMETHAMINE 30 MG/ML
30 INJECTION, SOLUTION INTRAMUSCULAR; INTRAVENOUS
Status: COMPLETED | OUTPATIENT
Start: 2021-10-03 | End: 2021-10-03

## 2021-10-03 RX ORDER — NAPROXEN 500 MG/1
500 TABLET ORAL 2 TIMES DAILY WITH MEALS
Qty: 20 TABLET | Refills: 0 | Status: SHIPPED | OUTPATIENT
Start: 2021-10-03 | End: 2021-10-13

## 2021-10-03 RX ORDER — METHOCARBAMOL 500 MG/1
500 TABLET, FILM COATED ORAL 3 TIMES DAILY
Qty: 20 TABLET | Refills: 0 | Status: SHIPPED | OUTPATIENT
Start: 2021-10-03 | End: 2021-10-10

## 2021-10-03 RX ADMIN — KETOROLAC TROMETHAMINE 30 MG: 30 INJECTION, SOLUTION INTRAMUSCULAR; INTRAVENOUS at 01:10

## 2021-10-05 ENCOUNTER — PATIENT MESSAGE (OUTPATIENT)
Dept: ADMINISTRATIVE | Facility: HOSPITAL | Age: 61
End: 2021-10-05

## 2021-11-10 ENCOUNTER — PATIENT MESSAGE (OUTPATIENT)
Dept: OBSTETRICS AND GYNECOLOGY | Facility: CLINIC | Age: 61
End: 2021-11-10
Payer: COMMERCIAL

## 2021-11-10 ENCOUNTER — TELEPHONE (OUTPATIENT)
Dept: INTERNAL MEDICINE | Facility: CLINIC | Age: 61
End: 2021-11-10
Payer: COMMERCIAL

## 2021-11-10 DIAGNOSIS — Z78.0 MENOPAUSE: ICD-10-CM

## 2021-11-12 RX ORDER — PROGESTERONE 100 MG/1
100 CAPSULE ORAL DAILY
Qty: 90 CAPSULE | Refills: 0 | Status: SHIPPED | OUTPATIENT
Start: 2021-11-12 | End: 2022-07-15

## 2021-11-21 ENCOUNTER — PATIENT MESSAGE (OUTPATIENT)
Dept: OBSTETRICS AND GYNECOLOGY | Facility: CLINIC | Age: 61
End: 2021-11-21
Payer: COMMERCIAL

## 2021-11-24 RX ORDER — VALSARTAN 320 MG/1
TABLET ORAL
Qty: 90 TABLET | Refills: 0 | Status: SHIPPED | OUTPATIENT
Start: 2021-11-24 | End: 2022-03-11

## 2021-12-14 DIAGNOSIS — I10 ESSENTIAL HYPERTENSION: ICD-10-CM

## 2021-12-17 RX ORDER — AMLODIPINE BESYLATE 10 MG/1
10 TABLET ORAL DAILY
Qty: 90 TABLET | Refills: 0 | Status: SHIPPED | OUTPATIENT
Start: 2021-12-17 | End: 2022-07-22

## 2022-03-04 NOTE — TELEPHONE ENCOUNTER
No new care gaps identified.  Powered by [employer]. Reference number: [id number].   3/04/2022 8:53:41 AM CST

## 2022-03-09 NOTE — TELEPHONE ENCOUNTER
Care Due:                  Date            Visit Type   Department     Provider  --------------------------------------------------------------------------------                                EP -                              PRIMARY      [facility] INTERNAL  Last Visit: 12-      CARE ([facility])   MEDICINE       Meredith Izaguirre                              EP -                              PRIMARY      [facility] INTERNAL  Next Visit: 04-      CARE ([facility])   MEDICINE       Meredith Izaguirre                                                            Last  Test          Frequency    Reason                     Performed    Due Date  --------------------------------------------------------------------------------    CBC.........  12 months..  valACYclovir.............  06- 06-    CMP.........  12 months..  atorvastatin,              06- 06-                             valACYclovir, valsartan..    Lipid Panel.  12 months..  atorvastatin.............  06- 06-    Powered by [employer]. Reference number: [id number].   3/09/2022 8:10:10 AM CST

## 2022-03-11 RX ORDER — VALSARTAN 320 MG/1
320 TABLET ORAL EVERY MORNING
Qty: 90 TABLET | Refills: 0 | Status: SHIPPED | OUTPATIENT
Start: 2022-03-11 | End: 2022-06-20

## 2022-03-11 NOTE — TELEPHONE ENCOUNTER
Refill Authorization Note   Odilia Winslow  is requesting a refill authorization.  Brief Assessment and Rationale for Refill:  Approve     Medication Therapy Plan:       Medication Reconciliation Completed: No   Comments:   --->Care Gap information included below if applicable.   Orders Placed This Encounter    valsartan (DIOVAN) 320 MG tablet      Requested Prescriptions   Signed Prescriptions Disp Refills    valsartan (DIOVAN) 320 MG tablet 90 tablet 0     Sig: Take 1 tablet (320 mg total) by mouth every morning.       Cardiovascular:  Angiotensin Receptor Blockers Passed - 3/4/2022  8:53 AM        Passed - Patient is at least 18 years old        Passed - Last BP in normal range within 360 days     BP Readings from Last 1 Encounters:   10/03/21 127/69               Passed - Valid encounter within last 15 months     Recent Visits  Date Type Provider Dept   12/23/20 Office Visit Meredith Izaguirre MD [facility]   Showing recent visits within past 720 days and meeting all other requirements  Future Appointments  No visits were found meeting these conditions.  Showing future appointments within next 150 days and meeting all other requirements      Future Appointments              In 1 week Odilia Perez MD [facility]    In 3 weeks Meredith Izaguirre MD [facility]                Passed - Cr is 1.39 or below and within 360 days     Lab Results   Component Value Date    CREATININE 0.8 06/08/2021    CREATININE 0.8 04/30/2018    CREATININE 0.7 02/02/2018              Passed - K is 5.2 or below and within 360 days     Potassium   Date Value Ref Range Status   06/08/2021 4.1 3.5 - 5.1 mmol/L Final   04/30/2018 4.2 3.5 - 5.1 mmol/L Final   02/02/2018 4.1 3.5 - 5.1 mmol/L Final              Passed - eGFR within 360 days     Lab Results   Component Value Date    EGFRNONAA >60.0 06/08/2021    EGFRNONAA >60.0 04/30/2018    EGFRNONAA >60.0  02/02/2018                    Appointments  past 12m or future 3m with PCP    Date Provider   Last Visit   12/23/2020 Meredith Izaguirre MD   Next Visit   3/9/2022 Meredith Izaguirre MD   ED visits in past 90 days: 0     Note composed:2:05 PM 03/11/2022

## 2022-03-16 RX ORDER — VALSARTAN 320 MG/1
320 TABLET ORAL EVERY MORNING
Qty: 90 TABLET | Refills: 0 | OUTPATIENT
Start: 2022-03-16

## 2022-03-16 NOTE — TELEPHONE ENCOUNTER
Provider Staff:     Action is required for this patient.   Please see care gap opportunities below in Care Due Message.     Thanks!  [facility]     Appointments      Date Provider   Last Visit   12/23/2020 Meredith Izaguirre MD   Next Visit   4/1/2022 Meredith Izaguirre MD     Note composed:2:24 PM 03/16/2022

## 2022-03-18 ENCOUNTER — PATIENT OUTREACH (OUTPATIENT)
Dept: INTERNAL MEDICINE | Facility: CLINIC | Age: 62
End: 2022-03-18
Payer: COMMERCIAL

## 2022-03-18 ENCOUNTER — PATIENT MESSAGE (OUTPATIENT)
Dept: INTERNAL MEDICINE | Facility: CLINIC | Age: 62
End: 2022-03-18
Payer: COMMERCIAL

## 2022-05-02 DIAGNOSIS — F41.1 GAD (GENERALIZED ANXIETY DISORDER): ICD-10-CM

## 2022-05-02 NOTE — TELEPHONE ENCOUNTER
Care Due:                  Date            Visit Type   Department     Provider  --------------------------------------------------------------------------------                                EP -                              PRIMARY      [facility] INTERNAL  Last Visit: 12-      CARE ([facility])   MEDICINE       Meredith Izaguirre  Next Visit: None Scheduled  None         None Found                                                            Last  Test          Frequency    Reason                     Performed    Due Date  --------------------------------------------------------------------------------    Office Visit  12 months..  EScitalopram, amLODIPine,   12- 12-                             atorvastatin, traZODone,                             valACYclovir, valsartan..    Powered by [employer]. Reference number: [id number].   5/02/2022 7:54:52 AM CDT

## 2022-05-03 RX ORDER — ESCITALOPRAM OXALATE 20 MG/1
TABLET ORAL
Qty: 90 TABLET | Refills: 0 | Status: SHIPPED | OUTPATIENT
Start: 2022-05-03 | End: 2022-08-22

## 2022-05-03 NOTE — TELEPHONE ENCOUNTER
Refill Routing Note   Medication(s) are not appropriate for processing by [facility] for the following reason(s):      - Patient has not been seen in over 15 months by PCP    [facility] action(s):  Defer Medication-related problems identified: Requires appointment     Medication Therapy Plan: lov 12/23/2020, Needs an OV with PCP  Medication reconciliation completed: No     Appointments  past 12m or future 3m with PCP    Date Provider   Last Visit   12/23/2020 Meredith Izaguirre MD   Next Visit   Visit date not found Meredith Izaguirre MD   ED visits in past 90 days: 0        Note composed:10:10 AM 05/03/2022

## 2022-05-30 ENCOUNTER — PATIENT MESSAGE (OUTPATIENT)
Dept: ADMINISTRATIVE | Facility: HOSPITAL | Age: 62
End: 2022-05-30
Payer: COMMERCIAL

## 2022-06-06 ENCOUNTER — PATIENT MESSAGE (OUTPATIENT)
Dept: INTERNAL MEDICINE | Facility: CLINIC | Age: 62
End: 2022-06-06
Payer: COMMERCIAL

## 2022-06-06 NOTE — TELEPHONE ENCOUNTER
Patient would like to know what Gastrologist Dr. Izaguirre rec.  Patient states that she was seeing someone at [facility] for Hiatal Hernia and food getting stuck in throat. Patient states that she would like to keep it in [facility].  Patient LOV 12/23/20

## 2022-07-14 ENCOUNTER — TELEPHONE (OUTPATIENT)
Dept: OBSTETRICS AND GYNECOLOGY | Facility: CLINIC | Age: 62
End: 2022-07-14

## 2022-07-14 ENCOUNTER — TELEPHONE (OUTPATIENT)
Dept: INTERNAL MEDICINE | Facility: CLINIC | Age: 62
End: 2022-07-14
Payer: COMMERCIAL

## 2022-07-14 DIAGNOSIS — R39.89 SUSPECTED UTI: Primary | ICD-10-CM

## 2022-07-14 RX ORDER — NITROFURANTOIN 25; 75 MG/1; MG/1
100 CAPSULE ORAL 2 TIMES DAILY
Qty: 14 CAPSULE | Refills: 0 | Status: SHIPPED | OUTPATIENT
Start: 2022-07-14 | End: 2022-07-21

## 2022-07-14 NOTE — TELEPHONE ENCOUNTER
Last seen 2018.  Had to r/s last annual d/t doctor being out.  Pt has also been dealing with 's health.  Pt reports frequency and burning yesterday.  Pt found an old Rx for Macrobid and took 2 doses yesterday and 1 today and has no more pills left.  No changes to Allergies or Medical hx.    Annual scheduled in Oct.    Macrobid pended if you approve.

## 2022-07-25 ENCOUNTER — PATIENT MESSAGE (OUTPATIENT)
Dept: INTERNAL MEDICINE | Facility: CLINIC | Age: 62
End: 2022-07-25
Payer: COMMERCIAL

## 2022-08-04 ENCOUNTER — LAB VISIT (OUTPATIENT)
Dept: LAB | Facility: HOSPITAL | Age: 62
End: 2022-08-04
Attending: INTERNAL MEDICINE
Payer: COMMERCIAL

## 2022-08-04 DIAGNOSIS — Z00.00 ANNUAL PHYSICAL EXAM: ICD-10-CM

## 2022-08-04 LAB
ALBUMIN SERPL BCP-MCNC: 3.9 G/DL (ref 3.5–5.2)
ALP SERPL-CCNC: 86 U/L (ref 55–135)
ALT SERPL W/O P-5'-P-CCNC: 17 U/L (ref 10–44)
ANION GAP SERPL CALC-SCNC: 6 MMOL/L (ref 8–16)
AST SERPL-CCNC: 24 U/L (ref 10–40)
BASOPHILS # BLD AUTO: 0.03 K/UL (ref 0–0.2)
BASOPHILS NFR BLD: 0.4 % (ref 0–1.9)
BILIRUB SERPL-MCNC: 0.5 MG/DL (ref 0.1–1)
BUN SERPL-MCNC: 12 MG/DL (ref 8–23)
CALCIUM SERPL-MCNC: 9.3 MG/DL (ref 8.7–10.5)
CHLORIDE SERPL-SCNC: 106 MMOL/L (ref 95–110)
CHOLEST SERPL-MCNC: 230 MG/DL (ref 120–199)
CHOLEST/HDLC SERPL: 2.9 {RATIO} (ref 2–5)
CO2 SERPL-SCNC: 26 MMOL/L (ref 23–29)
CREAT SERPL-MCNC: 0.6 MG/DL (ref 0.5–1.4)
DIFFERENTIAL METHOD: ABNORMAL
EOSINOPHIL # BLD AUTO: 0.1 K/UL (ref 0–0.5)
EOSINOPHIL NFR BLD: 1.2 % (ref 0–8)
ERYTHROCYTE [DISTWIDTH] IN BLOOD BY AUTOMATED COUNT: 12.9 % (ref 11.5–14.5)
EST. GFR  (NO RACE VARIABLE): >60 ML/MIN/1.73 M^2
ESTIMATED AVG GLUCOSE: 123 MG/DL (ref 68–131)
GLUCOSE SERPL-MCNC: 100 MG/DL (ref 70–110)
HBA1C MFR BLD: 5.9 % (ref 4–5.6)
HCT VFR BLD AUTO: 41.9 % (ref 37–48.5)
HDLC SERPL-MCNC: 80 MG/DL (ref 40–75)
HDLC SERPL: 34.8 % (ref 20–50)
HGB BLD-MCNC: 13.7 G/DL (ref 12–16)
IMM GRANULOCYTES # BLD AUTO: 0.04 K/UL (ref 0–0.04)
IMM GRANULOCYTES NFR BLD AUTO: 0.5 % (ref 0–0.5)
LDLC SERPL CALC-MCNC: 133.4 MG/DL (ref 63–159)
LYMPHOCYTES # BLD AUTO: 2 K/UL (ref 1–4.8)
LYMPHOCYTES NFR BLD: 25.9 % (ref 18–48)
MCH RBC QN AUTO: 31.6 PG (ref 27–31)
MCHC RBC AUTO-ENTMCNC: 32.7 G/DL (ref 32–36)
MCV RBC AUTO: 97 FL (ref 82–98)
MONOCYTES # BLD AUTO: 0.5 K/UL (ref 0.3–1)
MONOCYTES NFR BLD: 6.4 % (ref 4–15)
NEUTROPHILS # BLD AUTO: 5 K/UL (ref 1.8–7.7)
NEUTROPHILS NFR BLD: 65.6 % (ref 38–73)
NONHDLC SERPL-MCNC: 150 MG/DL
NRBC BLD-RTO: 0 /100 WBC
PLATELET # BLD AUTO: 407 K/UL (ref 150–450)
PMV BLD AUTO: 8.9 FL (ref 9.2–12.9)
POTASSIUM SERPL-SCNC: 4.2 MMOL/L (ref 3.5–5.1)
PROT SERPL-MCNC: 7.1 G/DL (ref 6–8.4)
RBC # BLD AUTO: 4.34 M/UL (ref 4–5.4)
SODIUM SERPL-SCNC: 138 MMOL/L (ref 136–145)
TRIGL SERPL-MCNC: 83 MG/DL (ref 30–150)
TSH SERPL DL<=0.005 MIU/L-ACNC: 0.49 UIU/ML (ref 0.4–4)
WBC # BLD AUTO: 7.65 K/UL (ref 3.9–12.7)

## 2022-08-04 PROCEDURE — 36415 COLL VENOUS BLD VENIPUNCTURE: CPT | Mod: PO | Performed by: INTERNAL MEDICINE

## 2022-08-04 PROCEDURE — 83036 HEMOGLOBIN GLYCOSYLATED A1C: CPT | Performed by: INTERNAL MEDICINE

## 2022-08-04 PROCEDURE — 80061 LIPID PANEL: CPT | Performed by: INTERNAL MEDICINE

## 2022-08-04 PROCEDURE — 80053 COMPREHEN METABOLIC PANEL: CPT | Performed by: INTERNAL MEDICINE

## 2022-08-04 PROCEDURE — 85025 COMPLETE CBC W/AUTO DIFF WBC: CPT | Performed by: INTERNAL MEDICINE

## 2022-08-04 PROCEDURE — 84443 ASSAY THYROID STIM HORMONE: CPT | Performed by: INTERNAL MEDICINE

## 2022-08-16 PROBLEM — R73.01 IFG (IMPAIRED FASTING GLUCOSE): Status: ACTIVE | Noted: 2022-08-16

## 2022-08-24 ENCOUNTER — PATIENT MESSAGE (OUTPATIENT)
Dept: INTERNAL MEDICINE | Facility: CLINIC | Age: 62
End: 2022-08-24
Payer: COMMERCIAL

## 2022-09-01 ENCOUNTER — OFFICE VISIT (OUTPATIENT)
Dept: INTERNAL MEDICINE | Facility: CLINIC | Age: 62
End: 2022-09-01
Attending: INTERNAL MEDICINE
Payer: COMMERCIAL

## 2022-09-01 VITALS
WEIGHT: 169.75 LBS | DIASTOLIC BLOOD PRESSURE: 60 MMHG | HEART RATE: 80 BPM | OXYGEN SATURATION: 98 % | BODY MASS INDEX: 26.64 KG/M2 | SYSTOLIC BLOOD PRESSURE: 134 MMHG | HEIGHT: 67 IN

## 2022-09-01 DIAGNOSIS — B00.1 COLD SORE: ICD-10-CM

## 2022-09-01 DIAGNOSIS — G47.00 INSOMNIA, UNSPECIFIED TYPE: ICD-10-CM

## 2022-09-01 DIAGNOSIS — F41.1 GAD (GENERALIZED ANXIETY DISORDER): ICD-10-CM

## 2022-09-01 DIAGNOSIS — Z12.31 ENCOUNTER FOR SCREENING MAMMOGRAM FOR MALIGNANT NEOPLASM OF BREAST: ICD-10-CM

## 2022-09-01 DIAGNOSIS — Z00.00 ANNUAL PHYSICAL EXAM: Primary | ICD-10-CM

## 2022-09-01 DIAGNOSIS — I10 ESSENTIAL HYPERTENSION: ICD-10-CM

## 2022-09-01 DIAGNOSIS — Z98.890 H/O PARATHYROIDECTOMY: ICD-10-CM

## 2022-09-01 DIAGNOSIS — Z90.89 H/O PARATHYROIDECTOMY: ICD-10-CM

## 2022-09-01 DIAGNOSIS — E78.5 HYPERLIPIDEMIA, UNSPECIFIED HYPERLIPIDEMIA TYPE: ICD-10-CM

## 2022-09-01 DIAGNOSIS — Z12.11 SCREENING FOR COLON CANCER: ICD-10-CM

## 2022-09-01 DIAGNOSIS — N95.9 MENOPAUSAL PROBLEM: ICD-10-CM

## 2022-09-01 PROCEDURE — 1160F RVW MEDS BY RX/DR IN RCRD: CPT | Mod: CPTII,S$GLB,, | Performed by: INTERNAL MEDICINE

## 2022-09-01 PROCEDURE — 3075F SYST BP GE 130 - 139MM HG: CPT | Mod: CPTII,S$GLB,, | Performed by: INTERNAL MEDICINE

## 2022-09-01 PROCEDURE — 3008F BODY MASS INDEX DOCD: CPT | Mod: CPTII,S$GLB,, | Performed by: INTERNAL MEDICINE

## 2022-09-01 PROCEDURE — 3008F PR BODY MASS INDEX (BMI) DOCUMENTED: ICD-10-PCS | Mod: CPTII,S$GLB,, | Performed by: INTERNAL MEDICINE

## 2022-09-01 PROCEDURE — 1159F MED LIST DOCD IN RCRD: CPT | Mod: CPTII,S$GLB,, | Performed by: INTERNAL MEDICINE

## 2022-09-01 PROCEDURE — 1160F PR REVIEW ALL MEDS BY PRESCRIBER/CLIN PHARMACIST DOCUMENTED: ICD-10-PCS | Mod: CPTII,S$GLB,, | Performed by: INTERNAL MEDICINE

## 2022-09-01 PROCEDURE — 4010F PR ACE/ARB THEARPY RXD/TAKEN: ICD-10-PCS | Mod: CPTII,S$GLB,, | Performed by: INTERNAL MEDICINE

## 2022-09-01 PROCEDURE — 4010F ACE/ARB THERAPY RXD/TAKEN: CPT | Mod: CPTII,S$GLB,, | Performed by: INTERNAL MEDICINE

## 2022-09-01 PROCEDURE — 1159F PR MEDICATION LIST DOCUMENTED IN MEDICAL RECORD: ICD-10-PCS | Mod: CPTII,S$GLB,, | Performed by: INTERNAL MEDICINE

## 2022-09-01 PROCEDURE — 3044F PR MOST RECENT HEMOGLOBIN A1C LEVEL <7.0%: ICD-10-PCS | Mod: CPTII,S$GLB,, | Performed by: INTERNAL MEDICINE

## 2022-09-01 PROCEDURE — 3078F PR MOST RECENT DIASTOLIC BLOOD PRESSURE < 80 MM HG: ICD-10-PCS | Mod: CPTII,S$GLB,, | Performed by: INTERNAL MEDICINE

## 2022-09-01 PROCEDURE — 99396 PR PREVENTIVE VISIT,EST,40-64: ICD-10-PCS | Mod: S$GLB,,, | Performed by: INTERNAL MEDICINE

## 2022-09-01 PROCEDURE — 99999 PR PBB SHADOW E&M-EST. PATIENT-LVL IV: ICD-10-PCS | Mod: PBBFAC,,, | Performed by: INTERNAL MEDICINE

## 2022-09-01 PROCEDURE — 99999 PR PBB SHADOW E&M-EST. PATIENT-LVL IV: CPT | Mod: PBBFAC,,, | Performed by: INTERNAL MEDICINE

## 2022-09-01 PROCEDURE — 3078F DIAST BP <80 MM HG: CPT | Mod: CPTII,S$GLB,, | Performed by: INTERNAL MEDICINE

## 2022-09-01 PROCEDURE — 3075F PR MOST RECENT SYSTOLIC BLOOD PRESS GE 130-139MM HG: ICD-10-PCS | Mod: CPTII,S$GLB,, | Performed by: INTERNAL MEDICINE

## 2022-09-01 PROCEDURE — 99499 UNLISTED E&M SERVICE: CPT | Mod: S$GLB,,, | Performed by: INTERNAL MEDICINE

## 2022-09-01 PROCEDURE — 3044F HG A1C LEVEL LT 7.0%: CPT | Mod: CPTII,S$GLB,, | Performed by: INTERNAL MEDICINE

## 2022-09-01 PROCEDURE — 99499 RISK ADDL DX/OHS AUDIT: ICD-10-PCS | Mod: S$GLB,,, | Performed by: INTERNAL MEDICINE

## 2022-09-01 PROCEDURE — 99396 PREV VISIT EST AGE 40-64: CPT | Mod: S$GLB,,, | Performed by: INTERNAL MEDICINE

## 2022-09-01 RX ORDER — CIPROFLOXACIN 500 MG/1
TABLET ORAL
COMMUNITY
Start: 2022-07-25 | End: 2022-09-01

## 2022-09-01 RX ORDER — ESCITALOPRAM OXALATE 20 MG/1
20 TABLET ORAL DAILY
Qty: 90 TABLET | Refills: 3 | Status: SHIPPED | OUTPATIENT
Start: 2022-09-01 | End: 2023-10-04

## 2022-09-01 RX ORDER — VALACYCLOVIR HYDROCHLORIDE 1 G/1
TABLET, FILM COATED ORAL
Qty: 30 TABLET | Refills: 3 | Status: SHIPPED | OUTPATIENT
Start: 2022-09-01 | End: 2023-02-01 | Stop reason: SDUPTHER

## 2022-09-01 RX ORDER — ATORVASTATIN CALCIUM 80 MG/1
80 TABLET, FILM COATED ORAL NIGHTLY
Qty: 90 TABLET | Refills: 3 | Status: SHIPPED | OUTPATIENT
Start: 2022-09-01 | End: 2023-11-29 | Stop reason: SDUPTHER

## 2022-09-01 RX ORDER — AMLODIPINE BESYLATE 10 MG/1
10 TABLET ORAL DAILY
Qty: 90 TABLET | Refills: 3 | Status: ON HOLD | OUTPATIENT
Start: 2022-09-01 | End: 2023-06-18 | Stop reason: SDUPTHER

## 2022-09-01 RX ORDER — TRAZODONE HYDROCHLORIDE 50 MG/1
50 TABLET ORAL NIGHTLY
Qty: 90 TABLET | Refills: 1 | Status: SHIPPED | OUTPATIENT
Start: 2022-09-01

## 2022-09-01 RX ORDER — VALSARTAN 320 MG/1
320 TABLET ORAL EVERY MORNING
Qty: 90 TABLET | Refills: 3 | Status: ON HOLD | OUTPATIENT
Start: 2022-09-01 | End: 2023-06-18 | Stop reason: SDUPTHER

## 2022-09-01 NOTE — PROGRESS NOTES
Subjective:   Patient ID: Odilia Winslow is a 62 y.o. 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     Has been exercising and maintaining weight    HLD:   - Skipping doses of statin - no side effects - working to get back on track with pillbox   -  <- 142    Previously:   Seen by zhou after presented to [facility] with htn and cp 6/2020  - had neg stress test   - no recurrence     Hyperparathyroidism now s/p parathyroidectomy, osteopenia:  - calcium and pth wnl and due for repeat  - dexa - penia in 2019 and due for repeat   - followed by endocrine 2/2018     Vit d def:   - has not been taking vit d supplement consistently   - prev taking vit d and level improved - Exercising reg      GERD, esophageal stricture s/p dilation:  - given ppi by GI specialist for hx of gerd and esophageal stricture s/p dilatation  - started taking otc prilosec which is effective when needed   - avoiding food triggers   - no dysphagia     HTN:   - took amlodipine 10mg and valsartan 320mg  - not checking bp at home     Anxiety:   - as above   - controlled on ssri - lexapro  - Middle son with schizophrenia - lives with her    Previously:   - tried counseling to help with coping strategies for stressors above and too $$   No depression, si/hi/ford    HM:  Shingrix   Mmg   Covid vaccine   Flu vaccine  Cscope    Derm: Turegano  Gyn: Chris  ENT: Nile  Cards: Elisha  Ortho: Skip - knee   Endo: Sheryl covington    Review of Systems    Objective:  Vitals:    09/01/22 1207   BP: 134/60   BP Location: Left arm   Patient Position: Sitting   Pulse: 80   SpO2: 98%   Weight:  "77 kg (169 lb 12.1 oz)   Height: 5' 7" (1.702 m)     Body mass index is 26.59 kg/m².    Physical Exam  Vitals reviewed.   Constitutional:       Appearance: Normal appearance. She is well-developed.   HENT:      Head: Normocephalic and atraumatic.      Right Ear: Tympanic membrane, ear canal and external ear normal.      Left Ear: Tympanic membrane, ear canal and external ear normal.      Nose: Nose normal. No congestion.      Mouth/Throat:      Mouth: Mucous membranes are moist.      Pharynx: Oropharynx is clear. No oropharyngeal exudate.   Eyes:      Extraocular Movements: Extraocular movements intact.      Conjunctiva/sclera: Conjunctivae normal.   Neck:      Thyroid: No thyromegaly.   Cardiovascular:      Rate and Rhythm: Normal rate and regular rhythm.      Pulses: Normal pulses.      Heart sounds: Normal heart sounds.   Pulmonary:      Effort: Pulmonary effort is normal.      Breath sounds: Normal breath sounds.   Abdominal:      General: Bowel sounds are normal.      Palpations: Abdomen is soft.   Musculoskeletal:         General: No swelling or tenderness.      Cervical back: Neck supple.   Lymphadenopathy:      Cervical: No cervical adenopathy.   Skin:     General: Skin is warm and dry.      Capillary Refill: Capillary refill takes less than 2 seconds.   Neurological:      General: No focal deficit present.      Mental Status: She is alert and oriented to person, place, and time. Mental status is at baseline.   Psychiatric:         Behavior: Behavior normal.         Thought Content: Thought content normal.       Assessment:  1. Annual physical exam    2. Encounter for screening mammogram for malignant neoplasm of breast    3. Screening for colon cancer    4. H/O parathyroidectomy    5. Menopausal problem    6. CLAY (generalized anxiety disorder)    7. Hyperlipidemia, unspecified hyperlipidemia type    8. Essential hypertension    9. Cold sore    10. Insomnia, unspecified type        Plan:  Annual physical " exam    Encounter for screening mammogram for malignant neoplasm of breast  -     Mammo Digital Screening Bilat w/ Saud; Future; Expected date: 09/01/2022    Screening for colon cancer  -     Ambulatory referral/consult to Endo Procedure ; Future; Expected date: 09/02/2022    H/O parathyroidectomy  -     PTH, intact; Future; Expected date: 09/01/2022  -     Vitamin D; Future; Expected date: 09/01/2022    Menopausal problem  -     DXA Bone Density Spine And Hip; Future; Expected date: 09/01/2022    CLAY (generalized anxiety disorder)  -     EScitalopram oxalate (LEXAPRO) 20 MG tablet; Take 1 tablet (20 mg total) by mouth once daily.  Dispense: 90 tablet; Refill: 3    Hyperlipidemia, unspecified hyperlipidemia type  -     atorvastatin (LIPITOR) 80 MG tablet; Take 1 tablet (80 mg total) by mouth every evening.  Dispense: 90 tablet; Refill: 3  See below   Do not skip doses   I recommend the Mediterranean diet, a graded exercise program and maintaining a healthy weight to optimize cholesterol levels.    Essential hypertension  -     valsartan (DIOVAN) 320 MG tablet; Take 1 tablet (320 mg total) by mouth every morning.  Dispense: 90 tablet; Refill: 3  -     amLODIPine (NORVASC) 10 MG tablet; Take 1 tablet (10 mg total) by mouth once daily.  Dispense: 90 tablet; Refill: 3  At goal   Cont meds   Low salt diet     Cold sore  -     valACYclovir (VALTREX) 1000 MG tablet; TAKE (1/2) HALF BY MOUTH EVERY 12 HOURS PRN  Dispense: 30 tablet; Refill: 3  Refills given     Insomnia, unspecified type  -     traZODone (DESYREL) 50 MG tablet; Take 1 tablet (50 mg total) by mouth every evening. prn  Dispense: 90 tablet; Refill: 1  Stable - use prn and cont exercise and good sleep hygiene     Cont current meds   Take meds consistently     Recommend daily sunscreen, cardiovascular exercise min 30 min 5 days per week. Seatbelts routinely.    Health Maintenance   Topic Date Due    Mammogram  12/12/2019    TETANUS VACCINE  05/04/2026     Lipid Panel  08/04/2027    Hepatitis C Screening  Completed

## 2022-10-10 ENCOUNTER — PATIENT MESSAGE (OUTPATIENT)
Dept: INTERNAL MEDICINE | Facility: CLINIC | Age: 62
End: 2022-10-10
Payer: COMMERCIAL

## 2022-10-13 ENCOUNTER — HOSPITAL ENCOUNTER (OUTPATIENT)
Dept: RADIOLOGY | Facility: HOSPITAL | Age: 62
Discharge: HOME | End: 2022-10-13
Attending: INTERNAL MEDICINE
Payer: COMMERCIAL

## 2022-10-13 DIAGNOSIS — Z12.31 ENCOUNTER FOR SCREENING MAMMOGRAM FOR MALIGNANT NEOPLASM OF BREAST: ICD-10-CM

## 2022-10-13 PROCEDURE — 77063 MAMMO DIGITAL SCREENING BILAT WITH TOMO: ICD-10-PCS | Mod: 26,,, | Performed by: RADIOLOGY

## 2022-10-13 PROCEDURE — 77063 BREAST TOMOSYNTHESIS BI: CPT | Mod: 26,,, | Performed by: RADIOLOGY

## 2022-10-13 PROCEDURE — 77063 BREAST TOMOSYNTHESIS BI: CPT | Mod: TC,PO

## 2022-10-13 PROCEDURE — 77067 MAMMO DIGITAL SCREENING BILAT WITH TOMO: ICD-10-PCS | Mod: 26,,, | Performed by: RADIOLOGY

## 2022-10-13 PROCEDURE — 77067 SCR MAMMO BI INCL CAD: CPT | Mod: 26,,, | Performed by: RADIOLOGY

## 2022-10-13 PROCEDURE — 77067 SCR MAMMO BI INCL CAD: CPT | Mod: TC,PO

## 2022-11-04 ENCOUNTER — TELEPHONE (OUTPATIENT)
Dept: INTERNAL MEDICINE | Facility: CLINIC | Age: 62
End: 2022-11-04
Payer: COMMERCIAL

## 2022-11-04 NOTE — TELEPHONE ENCOUNTER
Do not recommend increasing lexapro as dose is at the highest that is prescribed at 20mg   Recommend keep appt with me next week to review her symptoms - swelling likely due to amlodipine and we can discuss options for blood pressure med changes at that appt. If any new shortness of breath or chest pain then recommend go to ER over the weekend.

## 2022-11-04 NOTE — TELEPHONE ENCOUNTER
----- Message from Kezia Sim sent at 11/4/2022 10:44 AM CDT -----  Regarding: paul  Name of Who is Calling: Malinda           What is the request in detail: Patient is requesting a call back in regards to the swelling she has in both legs.            Can the clinic reply by [facility]: No           [facility]: [phone]

## 2022-11-04 NOTE — TELEPHONE ENCOUNTER
Returned pt's call.  Pt saw her Derm recently and bilat swelling was noted in both legs. Denies any SOB. Derm recommended that she be seen by PCP.    Pt states she was seen for a rash by her Derm, which ended up being more and needed MOHS. While healing from MOHS pt got a Staff infection and has been treated. Pt is under a great deal of stress with this and her 's bladder cancer and has found herself picking at the rash/sores on her legs. Derm evaluated and states she does not have an infection at this time.    Pt was teary at times during conversation and states she also thinks she needs an increased dose of her Lexapro.    Made first available appt with PCP next Thursday and re-assured pt that message wi;; also be sent to PCP for review.

## 2022-11-10 ENCOUNTER — HOSPITAL ENCOUNTER (OUTPATIENT)
Dept: RADIOLOGY | Facility: OTHER | Age: 62
Discharge: HOME | End: 2022-11-10
Attending: INTERNAL MEDICINE
Payer: COMMERCIAL

## 2022-11-10 ENCOUNTER — OFFICE VISIT (OUTPATIENT)
Dept: INTERNAL MEDICINE | Facility: CLINIC | Age: 62
End: 2022-11-10
Attending: INTERNAL MEDICINE
Payer: COMMERCIAL

## 2022-11-10 VITALS
HEIGHT: 67 IN | BODY MASS INDEX: 27.06 KG/M2 | WEIGHT: 172.38 LBS | DIASTOLIC BLOOD PRESSURE: 64 MMHG | SYSTOLIC BLOOD PRESSURE: 115 MMHG | HEART RATE: 92 BPM | OXYGEN SATURATION: 97 %

## 2022-11-10 DIAGNOSIS — I10 ESSENTIAL HYPERTENSION: Primary | ICD-10-CM

## 2022-11-10 DIAGNOSIS — M79.89 SWELLING OF LEFT LOWER EXTREMITY: ICD-10-CM

## 2022-11-10 DIAGNOSIS — F41.9 ANXIETY: ICD-10-CM

## 2022-11-10 PROCEDURE — 99214 PR OFFICE/OUTPT VISIT, EST, LEVL IV, 30-39 MIN: ICD-10-PCS | Mod: S$GLB,,, | Performed by: INTERNAL MEDICINE

## 2022-11-10 PROCEDURE — 93971 EXTREMITY STUDY: CPT | Mod: TC,LT

## 2022-11-10 PROCEDURE — 3044F HG A1C LEVEL LT 7.0%: CPT | Mod: CPTII,S$GLB,, | Performed by: INTERNAL MEDICINE

## 2022-11-10 PROCEDURE — 3078F DIAST BP <80 MM HG: CPT | Mod: CPTII,S$GLB,, | Performed by: INTERNAL MEDICINE

## 2022-11-10 PROCEDURE — 93971 EXTREMITY STUDY: CPT | Mod: 26,LT,, | Performed by: RADIOLOGY

## 2022-11-10 PROCEDURE — 4010F ACE/ARB THERAPY RXD/TAKEN: CPT | Mod: CPTII,S$GLB,, | Performed by: INTERNAL MEDICINE

## 2022-11-10 PROCEDURE — 4010F PR ACE/ARB THEARPY RXD/TAKEN: ICD-10-PCS | Mod: CPTII,S$GLB,, | Performed by: INTERNAL MEDICINE

## 2022-11-10 PROCEDURE — 1159F MED LIST DOCD IN RCRD: CPT | Mod: CPTII,S$GLB,, | Performed by: INTERNAL MEDICINE

## 2022-11-10 PROCEDURE — 1160F RVW MEDS BY RX/DR IN RCRD: CPT | Mod: CPTII,S$GLB,, | Performed by: INTERNAL MEDICINE

## 2022-11-10 PROCEDURE — 99214 OFFICE O/P EST MOD 30 MIN: CPT | Mod: S$GLB,,, | Performed by: INTERNAL MEDICINE

## 2022-11-10 PROCEDURE — 3074F PR MOST RECENT SYSTOLIC BLOOD PRESSURE < 130 MM HG: ICD-10-PCS | Mod: CPTII,S$GLB,, | Performed by: INTERNAL MEDICINE

## 2022-11-10 PROCEDURE — 3044F PR MOST RECENT HEMOGLOBIN A1C LEVEL <7.0%: ICD-10-PCS | Mod: CPTII,S$GLB,, | Performed by: INTERNAL MEDICINE

## 2022-11-10 PROCEDURE — 3078F PR MOST RECENT DIASTOLIC BLOOD PRESSURE < 80 MM HG: ICD-10-PCS | Mod: CPTII,S$GLB,, | Performed by: INTERNAL MEDICINE

## 2022-11-10 PROCEDURE — 3008F PR BODY MASS INDEX (BMI) DOCUMENTED: ICD-10-PCS | Mod: CPTII,S$GLB,, | Performed by: INTERNAL MEDICINE

## 2022-11-10 PROCEDURE — 99499 RISK ADDL DX/OHS AUDIT: ICD-10-PCS | Mod: S$GLB,,, | Performed by: INTERNAL MEDICINE

## 2022-11-10 PROCEDURE — 99499 UNLISTED E&M SERVICE: CPT | Mod: S$GLB,,, | Performed by: INTERNAL MEDICINE

## 2022-11-10 PROCEDURE — 93971 US LOWER EXTREMITY VEINS LEFT: ICD-10-PCS | Mod: 26,LT,, | Performed by: RADIOLOGY

## 2022-11-10 PROCEDURE — 99999 PR PBB SHADOW E&M-EST. PATIENT-LVL IV: ICD-10-PCS | Mod: PBBFAC,,, | Performed by: INTERNAL MEDICINE

## 2022-11-10 PROCEDURE — 99999 PR PBB SHADOW E&M-EST. PATIENT-LVL IV: CPT | Mod: PBBFAC,,, | Performed by: INTERNAL MEDICINE

## 2022-11-10 PROCEDURE — 1160F PR REVIEW ALL MEDS BY PRESCRIBER/CLIN PHARMACIST DOCUMENTED: ICD-10-PCS | Mod: CPTII,S$GLB,, | Performed by: INTERNAL MEDICINE

## 2022-11-10 PROCEDURE — 3008F BODY MASS INDEX DOCD: CPT | Mod: CPTII,S$GLB,, | Performed by: INTERNAL MEDICINE

## 2022-11-10 PROCEDURE — 1159F PR MEDICATION LIST DOCUMENTED IN MEDICAL RECORD: ICD-10-PCS | Mod: CPTII,S$GLB,, | Performed by: INTERNAL MEDICINE

## 2022-11-10 PROCEDURE — 3074F SYST BP LT 130 MM HG: CPT | Mod: CPTII,S$GLB,, | Performed by: INTERNAL MEDICINE

## 2022-11-10 RX ORDER — HYDROXYZINE HYDROCHLORIDE 10 MG/1
10 TABLET, FILM COATED ORAL 3 TIMES DAILY PRN
Qty: 60 TABLET | Refills: 3 | Status: SHIPPED | OUTPATIENT
Start: 2022-11-10

## 2022-11-10 NOTE — PROGRESS NOTES
"Subjective:   Patient ID: Odilia Winslow is a 62 y.o. female  Chief complaint:   Chief Complaint   Patient presents with    Leg Swelling    Anxiety      is going through bladder cancer. Pt is feeling a bit anxious.       HPI  Here for HTN follow up     HTN:  No cp with exercise  Bp controlled today and at home - readings 120-130s/70-80 had one elevated   - took amlodipine 10mg and valsartan 320mg    Ankle swelling:    Had skin infection after bx - noticed more swelling with this on left LE - infection now treated and improved  Last week at derm appt had more swelling and wanted to check this out after derm commended - derm Betseychung Torres   No calf pain or swelling   Sleeps on 2 pills - stable   No orthopnea or cp or sob    Has been exercising and maintaining weight    HLD:   - Skipping doses of statin - no side effects - working to get back on track with pillbox   -  <-133 <- 142  Previously:   Seen by zhou after presented to [facility] with htn and cp 6/2020  - had neg stress test   - no recurrence     Anxiety:   - on lexapro + skin picking  Since lcv: more stress as below  -  dx with stage 3 bladder cancer - Had hip replacement and then dx with bladder cancer   - Son  last year and jsut found out pregnant  - usband retired and sold home and now in [employer] - enjoying this   - Middle son with schizophrenia - lives with her    Previously:   - tried counseling to help with coping strategies for stressors above and too $$   No depression, si/hi/fodr    HM:  Shingrix   Covid vaccine   Flu vaccine  Cscope    Derm: Turegano  Gyn: Chris  ENT: Nile  Cards: Elisha  Ortho: Skip - knee   Endo: Sheryl covington    Review of Systems    Objective:  Vitals:    11/10/22 1043   BP: 115/64   BP Location: Left arm   Patient Position: Sitting   Pulse: 92   SpO2: 97%   Weight: 78.2 kg (172 lb 6.4 oz)   Height: 5' 7" (1.702 m)     Body mass index is 27 kg/m².    Physical Exam  Vitals " reviewed.   Constitutional:       Appearance: Normal appearance. She is well-developed.   HENT:      Head: Normocephalic and atraumatic.   Eyes:      Extraocular Movements: Extraocular movements intact.      Conjunctiva/sclera: Conjunctivae normal.   Neck:      Thyroid: No thyromegaly.   Cardiovascular:      Rate and Rhythm: Normal rate and regular rhythm.      Pulses: Normal pulses.      Heart sounds: Normal heart sounds.   Pulmonary:      Effort: Pulmonary effort is normal.      Breath sounds: Normal breath sounds.   Abdominal:      General: Bowel sounds are normal.      Palpations: Abdomen is soft.   Musculoskeletal:         General: No swelling or tenderness.      Cervical back: Neck supple.      Comments: Left lower extremity with mild ankle edema > right   No calf ttp or inc warmth   Bx site healing   No purulent drainage or fluctuance    Lymphadenopathy:      Cervical: No cervical adenopathy.   Skin:     General: Skin is warm and dry.      Capillary Refill: Capillary refill takes less than 2 seconds.   Neurological:      General: No focal deficit present.      Mental Status: She is alert and oriented to person, place, and time. Mental status is at baseline.   Psychiatric:         Behavior: Behavior normal.         Thought Content: Thought content normal.       Assessment:  1. Essential hypertension    2. Anxiety    3. Swelling of left lower extremity        Plan:  Odilia was seen today for leg swelling and anxiety.    Diagnoses and all orders for this visit:    Essential hypertension  Stable - can consider dec ccb and giving low dose diuretic   Cont home bp readings  Low salt diet     Anxiety  -     hydrOXYzine HCL (ATARAX) 10 MG Tab; Take 1 tablet (10 mg total) by mouth 3 (three) times daily as needed (anxiety).  -     Ambulatory referral/consult to Primary Care Behavioral Health (Non-Opioids); Future  Add hydroxyzine prn and counseling   Cont ssri - see below   Can consider switching to fluoxetine,  citalopram, fluvoxamine, and sertraline   Start tiral of hydroxyzine prn   Inc cardio - spinning     Swelling of left lower extremity  -     US Lower Extremity Veins Left; Future    Health Maintenance   Topic Date Due    Mammogram  10/13/2023    DEXA Scan  10/13/2024    TETANUS VACCINE  05/04/2026    Lipid Panel  10/13/2027    Hepatitis C Screening  Completed

## 2022-11-25 ENCOUNTER — PATIENT MESSAGE (OUTPATIENT)
Dept: BEHAVIORAL HEALTH | Facility: OTHER | Age: 62
End: 2022-11-25
Payer: COMMERCIAL

## 2022-11-29 ENCOUNTER — OFFICE VISIT (OUTPATIENT)
Dept: DERMATOLOGY | Facility: CLINIC | Age: 62
End: 2022-11-29
Payer: COMMERCIAL

## 2022-11-29 DIAGNOSIS — F41.9 ANXIETY: ICD-10-CM

## 2022-11-29 DIAGNOSIS — L29.9 PRURITUS: ICD-10-CM

## 2022-11-29 DIAGNOSIS — L28.1 PRURIGO NODULARIS: ICD-10-CM

## 2022-11-29 DIAGNOSIS — L98.9 DISEASE OF SKIN AND SUBCUTANEOUS TISSUE: Primary | ICD-10-CM

## 2022-11-29 PROCEDURE — 88312 SPECIAL STAINS GROUP 1: CPT | Performed by: PATHOLOGY

## 2022-11-29 PROCEDURE — 99204 PR OFFICE/OUTPT VISIT, NEW, LEVL IV, 45-59 MIN: ICD-10-PCS | Mod: 25,S$GLB,, | Performed by: DERMATOLOGY

## 2022-11-29 PROCEDURE — 88305 TISSUE EXAM BY PATHOLOGIST: ICD-10-PCS | Mod: 26,,, | Performed by: PATHOLOGY

## 2022-11-29 PROCEDURE — 88312 PR  SPECIAL STAINS,GROUP I: ICD-10-PCS | Mod: 26,,, | Performed by: PATHOLOGY

## 2022-11-29 PROCEDURE — 99999 PR PBB SHADOW E&M-EST. PATIENT-LVL III: CPT | Mod: PBBFAC,,, | Performed by: DERMATOLOGY

## 2022-11-29 PROCEDURE — 99204 OFFICE O/P NEW MOD 45 MIN: CPT | Mod: 25,S$GLB,, | Performed by: DERMATOLOGY

## 2022-11-29 PROCEDURE — 1159F PR MEDICATION LIST DOCUMENTED IN MEDICAL RECORD: ICD-10-PCS | Mod: CPTII,S$GLB,, | Performed by: DERMATOLOGY

## 2022-11-29 PROCEDURE — 1159F MED LIST DOCD IN RCRD: CPT | Mod: CPTII,S$GLB,, | Performed by: DERMATOLOGY

## 2022-11-29 PROCEDURE — 3044F PR MOST RECENT HEMOGLOBIN A1C LEVEL <7.0%: ICD-10-PCS | Mod: CPTII,S$GLB,, | Performed by: DERMATOLOGY

## 2022-11-29 PROCEDURE — 4010F ACE/ARB THERAPY RXD/TAKEN: CPT | Mod: CPTII,S$GLB,, | Performed by: DERMATOLOGY

## 2022-11-29 PROCEDURE — 88305 TISSUE EXAM BY PATHOLOGIST: CPT | Performed by: PATHOLOGY

## 2022-11-29 PROCEDURE — 11104 PUNCH BX SKIN SINGLE LESION: CPT | Mod: S$GLB,,, | Performed by: DERMATOLOGY

## 2022-11-29 PROCEDURE — 99999 PR PBB SHADOW E&M-EST. PATIENT-LVL III: ICD-10-PCS | Mod: PBBFAC,,, | Performed by: DERMATOLOGY

## 2022-11-29 PROCEDURE — 88312 SPECIAL STAINS GROUP 1: CPT | Mod: 26,,, | Performed by: PATHOLOGY

## 2022-11-29 PROCEDURE — 4010F PR ACE/ARB THEARPY RXD/TAKEN: ICD-10-PCS | Mod: CPTII,S$GLB,, | Performed by: DERMATOLOGY

## 2022-11-29 PROCEDURE — 11104 PR PUNCH BIOPSY, SKIN, SINGLE LESION: ICD-10-PCS | Mod: S$GLB,,, | Performed by: DERMATOLOGY

## 2022-11-29 PROCEDURE — 88305 TISSUE EXAM BY PATHOLOGIST: CPT | Mod: 26,,, | Performed by: PATHOLOGY

## 2022-11-29 PROCEDURE — 3044F HG A1C LEVEL LT 7.0%: CPT | Mod: CPTII,S$GLB,, | Performed by: DERMATOLOGY

## 2022-11-29 RX ORDER — HYDROXYZINE HYDROCHLORIDE 25 MG/1
25 TABLET, FILM COATED ORAL NIGHTLY
Qty: 30 TABLET | Refills: 2 | Status: SHIPPED | OUTPATIENT
Start: 2022-11-29 | End: 2023-02-15 | Stop reason: SDUPTHER

## 2022-11-29 RX ORDER — CLOBETASOL PROPIONATE 0.5 MG/G
OINTMENT TOPICAL
Qty: 60 G | Refills: 3 | Status: SHIPPED | OUTPATIENT
Start: 2022-11-29 | End: 2023-02-15 | Stop reason: SDUPTHER

## 2022-11-29 NOTE — PATIENT INSTRUCTIONS
Punch Biopsy Wound Care    Your doctor has performed a punch biopsy today.  A band aid and antibiotic ointment has been placed over the site.  This should remain in place for 24 hours.  It is recommended that you keep the area dry for the first 24 hours.  After 24 hours, you may remove the band aid and wash the area with warm soap and water and apply Vaseline jelly.  Many patients prefer to use Neosporin or Bacitracin ointment.  This is acceptable; however know that you can develop an allergy to this medication even if you have used it safely for years.  It is important to keep the area moist.  Letting it dry out and get air slows healing time, will worsen the scar, and make it more difficult to remove the stitches if they were placed.  Band aid is optional after first 24 hours.      If you notice increasing redness, tenderness, pain, or yellow drainage at the biopsy or surgical site, please notify your doctor.  These are signs of an infection.    If your biopsy/surgical site is bleeding, apply firm pressure for 15 minutes straight.  Repeat for another 15 minutes, if it is still bleeding.   If the surgical site continues to bleed, then please contact your doctor.      For [facility] users:   You will receive your biopsy results in [facility] as soon as they are available. Please be assured that your physician/provider will review your results and will then determine what further treatment, evaluation, or planning is required. You should be contacted by your physician's/provider's office within 5 business days of receiving your results; If not, please reach out to directly. This is one more way [facility] is putting you first.       [facility] [place] [address]/ [phone] FAX/ [email or web address]      Prurigo nodularis  Excoriation (skin-picking) disorder  -Do not pick at these lesions .  To use dove soap or gentle soap recommended, followed by therapy steroid ointment to lesions at night  or every am and pm( only twice daily), then apply cerave anti itch cream all over  -then if still itchy pick at it use the cerave anti-itch cream  -Find outlets that make you happy  -for lesions on the body.  Recommends bathing in the Cerave eczema creamy wash bites therapy or Dove or cetaphil gental wash , followed by using these therapy clobetasol, then cerave anti itch, use gloves at night to try not to pick scan keep the AC down on low, apply ice or CeraVe anti itch cream when needed.  In the a.m. reapply  the clobetasol  ointment if needed to lesions  - you can cover at night with cotton pjs and dampen areas or use saran wrap to areas trying least hour  a day  - Start N-acetylcystine over counter  Instructions for takin mg daily for one week  then 600 mg twice daily for one week  then 1200 mg every morning and 600 mg at night for one week  then 1200 mg twice daily  Anxiety  - Spent much time in counseling and conversation that the picking is due to anxiety. Discussed other ways of relieving stress such as reading a book, walking, cross words, anything to keep her hands busy    Pruritus  - Reviewed Labs with patient CBC, CMP, TSH, there are no underlying liver, kidney or thyroid problems. Patient is under multiple meds and stress, will continue to monitor    XEROSIS (DRY SKIN)        Definition    Xerosis is the term for dry skin.  We all have a natural oil coating over our skin produced by the skin oil glands.  If this oil is removed, the skin becomes dry which can lead to cracking, which can lead to inflammation.  Xerosis is usually a long-term problem that recurs often, especially in the winter.    Cause    Long hot baths or showers can remove our natural oil and lead to xerosis.  One should never take more than one bath or shower a day and for no longer than ten minutes.  Use of harsh soaps such as Zest, Dial, and Ivory can worsen and cause xerosis.  Cold winter weather worsens xerosis because the  amount of moisture contained in cold air is much less than the amount of moisture in warm air.    Treatment    Treatment is intended to restore the natural oil to your skin.  Keep the skin lubricated.    Do not take more than one bath or shower a day.  Use lukewarm water, not hot.  Hot water dries out the skin.    Use a gentle moisturizing soap such as Cetaphil soap, Oil of Olay, Dove, Basis, Ivory moisture care, Restoraderm cleanser.    When toweling dry, dont rub.  Blot the skin so there is still some water left on the skin.  You should apply a moisturizing cream to all of the skin such as Cerave cream, Cetaphil cream, Lipikar [place] AP+ Intense Repair Moisturizing Cream or Restoraderm or Eucerin Original Formula cream.   Alpha hydroxyacid lotions, i.e., AmLactin, also work very well for preventing dry skin, but may burn when used on inflamed or reddened skin.    If you like to swim during the winter months, you should not use soap when getting out of the pool.  When you have finished swimming, rinse off the chlorine with cool to warm water.  If this will be the only shower of the day, then you may use Cetaphil or another mild soap to cleanse your skin.  After the shower, apply a moisturizing cream to all of the skin as above.        [address] [place] [address]/ [phone] FAX/ [email or web address]

## 2022-11-29 NOTE — PROGRESS NOTES
Subjective:       Patient ID:  Odilia Winslow is a 62 y.o. female who presents for   Chief Complaint   Patient presents with    Rash     Diffuse      C/o rash after MOH surgery left leg-July . Tx'd with abx oral and creams. Mild relief .    This is a high risk patient here to check for the development of new lesions.    Rash - Initial  Affected locations: diffuse  Duration: 3 months  Signs / symptoms: itching, redness and tender  Severity: moderate  Timing: constant  Aggravated by: nothing  Relieving factors/Treatments tried: OTC antibiotic cream  Improvement on treatment: mild    Review of Systems   Constitutional:  Negative for fever, chills, fatigue and malaise.   Skin:  Positive for rash and activity-related sunscreen use.   Hematologic/Lymphatic: Bruises/bleeds easily.      Objective:    Physical Exam   Constitutional: She appears well-developed and well-nourished. No distress.   Neurological: She is alert and oriented to person, place, and time. She is not disoriented.   Psychiatric: She has a normal mood and affect.   Skin:   Areas Examined (abnormalities noted in diagram):   RUE Inspected  LUE Inspection Performed  RLE Inspected  LLE Inspection Performed            Diagram Legend     Erythematous scaling macule/papule c/w actinic keratosis       Vascular papule c/w angioma      Pigmented verrucoid papule/plaque c/w seborrheic keratosis      Yellow umbilicated papule c/w sebaceous hyperplasia      Irregularly shaped tan macule c/w lentigo     1-2 mm smooth white papules consistent with Milia      Movable subcutaneous cyst with punctum c/w epidermal inclusion cyst      Subcutaneous movable cyst c/w pilar cyst      Firm pink to brown papule c/w dermatofibroma      Pedunculated fleshy papule(s) c/w skin tag(s)      Evenly pigmented macule c/w junctional nevus     Mildly variegated pigmented, slightly irregular-bordered macule c/w mildly atypical nevus      Flesh colored to evenly pigmented papule c/w  intradermal nevus       Pink pearly papule/plaque c/w basal cell carcinoma      Erythematous hyperkeratotic cursted plaque c/w SCC      Surgical scar with no sign of skin cancer recurrence      Open and closed comedones      Inflammatory papules and pustules      Verrucoid papule consistent consistent with wart     Erythematous eczematous patches and plaques     Dystrophic onycholytic nail with subungual debris c/w onychomycosis     Umbilicated papule    Erythematous-base heme-crusted tan verrucoid plaque consistent with inflamed seborrheic keratosis     Erythematous Silvery Scaling Plaque c/w Psoriasis     See annotation                            Assessment / Plan:      Pathology Orders:       Normal Orders This Visit    Specimen to Pathology, Dermatology     Questions:    Procedure Type: Dermatology and skin neoplasms    Number of Specimens: 1    ------------------------: -------------------------    Spec 1 Procedure: Biopsy    Spec 1 Clinical Impression: r/o eczema or other dermal hypersensitivty reaction (biopsy of non-excoriated lesion)    Spec 1 Source: mid back    Release to patient: Immediate          Disease of skin and subcutaneous tissue  -     clobetasol 0.05% (TEMOVATE) 0.05 % Oint; AAA bid avoid face/groin  Dispense: 60 g; Refill: 3  -     Specimen to Pathology, Dermatology  -     hydrOXYzine HCL (ATARAX) 25 MG tablet; Take 1 tablet (25 mg total) by mouth every evening. If too sedating okay to take 1/2 a pill  Dispense: 30 tablet; Refill: 2  Punch biopsy procedure note:  Punch biopsy performed after verbal consent obtained. Area marked and prepped with alcohol. Approximately 1cc of 1% lidocaine with epinephrine injected. 4 mm disposable punch used to remove lesion. Hemostasis obtained and biopsy site closed with 1 - 2 Prolene sutures. Wound care instructions reviewed with patient and handout given.  Prurigo nodularis  Excoriation (skin-picking) disorder  -Do not pick at these lesions .  To use dove  soap or gentle soap recommended, followed by therapy steroid ointment to lesions at night or every am and pm( only twice daily), then apply cerave anti itch cream all over  -then if still itchy pick at it use the cerave anti-itch cream  -Find outlets that make you happy  -for lesions on the body.  Recommends bathing in the Cerave eczema creamy wash bites therapy or Dove or cetaphil gental wash , followed by using these therapy clobetasol, then cerave anti itch, use gloves at night to try not to pick scan keep the AC down on low, apply ice or CeraVe anti itch cream when needed.  In the a.m. reapply  the clobetasol  ointment if needed to lesions  - you can cover at night with cotton pjs and dampen areas or use saran wrap to areas trying least hour  a day  - Start N-acetylcystine over counter  Instructions for takin mg daily for one week  then 600 mg twice daily for one week  then 1200 mg every morning and 600 mg at night for one week  then 1200 mg twice daily  Anxiety  - Spent much time in counseling and conversation that the picking is due to anxiety. Discussed other ways of relieving stress such as reading a book, walking, cross words, anything to keep her hands busy    Pruritus  - Reviewed Labs with patient CBC, CMP, TSH, there are no underlying liver, kidney or thyroid problems. Patient is under multiple meds and stress, will continue to monitor    Over 30 minutes spent in patient encounter and counseling, patient will need active follow up and encouragement to help her see her small successes in not picking lesions             No follow-ups on file.

## 2022-12-12 LAB
FINAL PATHOLOGIC DIAGNOSIS: NORMAL
GROSS: NORMAL
Lab: NORMAL
MICROSCOPIC EXAM: NORMAL

## 2022-12-13 ENCOUNTER — OFFICE VISIT (OUTPATIENT)
Dept: DERMATOLOGY | Facility: CLINIC | Age: 62
End: 2022-12-13
Payer: COMMERCIAL

## 2022-12-13 DIAGNOSIS — L28.1 PRURIGO NODULARIS: Primary | ICD-10-CM

## 2022-12-13 DIAGNOSIS — L08.9 SUPERFICIAL SKIN INFECTION: ICD-10-CM

## 2022-12-13 DIAGNOSIS — L23.9 DERMAL HYPERSENSITIVITY REACTION: ICD-10-CM

## 2022-12-13 DIAGNOSIS — F41.9 ANXIETY: ICD-10-CM

## 2022-12-13 PROCEDURE — 11901 INJECT SKIN LESIONS >7: CPT | Mod: S$GLB,,, | Performed by: DERMATOLOGY

## 2022-12-13 PROCEDURE — 87186 SC STD MICRODIL/AGAR DIL: CPT | Performed by: DERMATOLOGY

## 2022-12-13 PROCEDURE — 87070 CULTURE OTHR SPECIMN AEROBIC: CPT | Performed by: DERMATOLOGY

## 2022-12-13 PROCEDURE — 11901 PR INJECTION, SKIN LESIONS, 8 OR MORE: ICD-10-PCS | Mod: S$GLB,,, | Performed by: DERMATOLOGY

## 2022-12-13 PROCEDURE — 4010F PR ACE/ARB THEARPY RXD/TAKEN: ICD-10-PCS | Mod: CPTII,S$GLB,, | Performed by: DERMATOLOGY

## 2022-12-13 PROCEDURE — 1159F PR MEDICATION LIST DOCUMENTED IN MEDICAL RECORD: ICD-10-PCS | Mod: CPTII,S$GLB,, | Performed by: DERMATOLOGY

## 2022-12-13 PROCEDURE — 99999 PR PBB SHADOW E&M-EST. PATIENT-LVL III: CPT | Mod: PBBFAC,,, | Performed by: DERMATOLOGY

## 2022-12-13 PROCEDURE — 3044F HG A1C LEVEL LT 7.0%: CPT | Mod: CPTII,S$GLB,, | Performed by: DERMATOLOGY

## 2022-12-13 PROCEDURE — 4010F ACE/ARB THERAPY RXD/TAKEN: CPT | Mod: CPTII,S$GLB,, | Performed by: DERMATOLOGY

## 2022-12-13 PROCEDURE — 1159F MED LIST DOCD IN RCRD: CPT | Mod: CPTII,S$GLB,, | Performed by: DERMATOLOGY

## 2022-12-13 PROCEDURE — 99214 PR OFFICE/OUTPT VISIT, EST, LEVL IV, 30-39 MIN: ICD-10-PCS | Mod: 25,S$GLB,, | Performed by: DERMATOLOGY

## 2022-12-13 PROCEDURE — 3044F PR MOST RECENT HEMOGLOBIN A1C LEVEL <7.0%: ICD-10-PCS | Mod: CPTII,S$GLB,, | Performed by: DERMATOLOGY

## 2022-12-13 PROCEDURE — 99214 OFFICE O/P EST MOD 30 MIN: CPT | Mod: 25,S$GLB,, | Performed by: DERMATOLOGY

## 2022-12-13 PROCEDURE — 99999 PR PBB SHADOW E&M-EST. PATIENT-LVL III: ICD-10-PCS | Mod: PBBFAC,,, | Performed by: DERMATOLOGY

## 2022-12-13 PROCEDURE — 87077 CULTURE AEROBIC IDENTIFY: CPT | Performed by: DERMATOLOGY

## 2022-12-13 RX ORDER — MUPIROCIN 20 MG/G
OINTMENT TOPICAL 2 TIMES DAILY
Qty: 30 G | Refills: 0 | Status: SHIPPED | OUTPATIENT
Start: 2022-12-13

## 2022-12-13 RX ORDER — DOXYCYCLINE 100 MG/1
1 TABLET ORAL 2 TIMES DAILY
Qty: 20 TABLET | Refills: 0 | Status: SHIPPED | OUTPATIENT
Start: 2022-12-13 | End: 2022-12-23

## 2022-12-13 NOTE — PROGRESS NOTES
Subjective:       Patient ID:  Odilia Winslow is a 62 y.o. female who presents for   Chief Complaint   Patient presents with    Suture / Staple Removal     Bx results         Final Pathologic Diagnosis   Date Value Ref Range Status   11/29/2022   Final    Skin, mid back, punch biopsy:  -ALLERGIC URTICARIAL REACTION, see comment  COMMENT:  Clinical images were reviewed in epic and differential diagnosis  noted.  The findings suggest a hypersensitivity reaction.  While there is no  evidence of vasculitis, some early vasculitic processes may exhibit similar  findings.  Drug reactions, urticaria, insect bites, and bullous pemphigoid  may, at times, have similar morphologic findings.  Clinical correlation is  recommended.       Comment:     Interp By Elliott العلي M.D., Signed on 12/12/2022 at 13:28       Patient is here for f/u of biopsy has been using clobetasol ointment and hydroxyzine with some relief. Has stopped picking, using cerave anti-itch    Review of Systems   Skin:  Positive for itching and rash.   Psychiatric/Behavioral:  Positive for anxiety and high stress ( with cancer).       Objective:    Physical Exam   Constitutional: She appears well-developed and well-nourished. No distress.   Neurological: She is alert and oriented to person, place, and time. She is not disoriented.   Psychiatric: She has a normal mood and affect.   Skin:   Areas Examined (abnormalities noted in diagram):   RUE Inspected  LUE Inspection Performed  RLE Inspected  LLE Inspection Performed                Diagram Legend     Erythematous scaling macule/papule c/w actinic keratosis       Vascular papule c/w angioma      Pigmented verrucoid papule/plaque c/w seborrheic keratosis      Yellow umbilicated papule c/w sebaceous hyperplasia      Irregularly shaped tan macule c/w lentigo     1-2 mm smooth white papules consistent with Milia      Movable subcutaneous cyst with punctum c/w epidermal inclusion cyst      Subcutaneous  movable cyst c/w pilar cyst      Firm pink to brown papule c/w dermatofibroma      Pedunculated fleshy papule(s) c/w skin tag(s)      Evenly pigmented macule c/w junctional nevus     Mildly variegated pigmented, slightly irregular-bordered macule c/w mildly atypical nevus      Flesh colored to evenly pigmented papule c/w intradermal nevus       Pink pearly papule/plaque c/w basal cell carcinoma      Erythematous hyperkeratotic cursted plaque c/w SCC      Surgical scar with no sign of skin cancer recurrence      Open and closed comedones      Inflammatory papules and pustules      Verrucoid papule consistent consistent with wart     Erythematous eczematous patches and plaques     Dystrophic onycholytic nail with subungual debris c/w onychomycosis     Umbilicated papule    Erythematous-base heme-crusted tan verrucoid plaque consistent with inflamed seborrheic keratosis     Erythematous Silvery Scaling Plaque c/w Psoriasis     See annotation      Assessment / Plan:        Prurigo nodularis  -     MO [id number] ACETONIDE INJ PER 10MG  -     MO INJECTION, SKIN LESIONS, 8 OR MORE  -     triamcinolone acetonide injection 10 mg    Anxiety  Excoriation (skin-picking) disorder  -Do not pick at these lesions .  To use dove soap or gentle soap recommended, followed by therapy steroid ointment to lesions at night or every am and pm( only twice daily), then apply cerave anti itch cream all over  -then if still itchy pick at it use the cerave anti-itch cream  -Find outlets that make you happy  -for lesions on the body.  Recommends bathing in the Cerave eczema creamy wash bites therapy or Dove or cetaphil gental wash , followed by using these therapy clobetasol, then cerave anti itch, use gloves at night to try not to pick scan keep the AC down on low, apply ice or CeraVe anti itch cream when needed.  In the a.m. reapply  the clobetasol  ointment if needed to lesions  - you can cover at night with cotton pjs and dampen areas or  use saran wrap to areas trying least hour  a day  - Start N-acetylcystine over counter  Instructions for takin mg daily for one week  then 600 mg twice daily for one week  then 1200 mg every morning and 600 mg at night for one week  then 1200 mg twice daily  Anxiety  - Spent much time in counseling and conversation that the picking is due to anxiety. Discussed other ways of relieving stress such as reading a book, walking, cross words, anything to keep her hands busy      Dermal hypersensitivity reaction  - hx of MRSA infection? 2/2 that vs post surgical no new meds or vaccines, CBC/CMP reviewed and within normal limits, up to date with cancer screenings besides colorectal   Start Claritin 10 mg or Zyrtec 10 mg  Or allegra 25 mg  or Zyxal over the counter at breakfast and at lunch  At night take hydroxyzine  Apply steroid cream prn itching up to two times a day (every morning and evening) as needed, if itching in between use - Use cerave anti- itch cream as often a needed put in fridge. Ice will help with itch as well.     Vitamin D def  - take vitamin D as  can cause itch/hypersensitivity , low vitamin, followed by PCP      Superficial skin infection  -     doxycycline monohydrate 100 mg Tab; Take 1 tablet (100 mg total) by mouth 2 (two) times daily. for 10 days  Dispense: 20 tablet; Refill: 0  -     mupirocin (BACTROBAN) 2 % ointment; Apply topically 2 (two) times daily. To back and nares  Dispense: 30 g; Refill: 0    Hx of MRSA  Instructed to do muprocin bid to nares and to lesion of back, cover lesion of back  And then do hibiclens to back   Once a week bathe body with hibclens   Other than that use dove soap    Discussed benefits and risks of doxycyline therapy including but not limited to GI discomfort, esophageal irritation/ulceration, and increased sun sensitivity. Patient was counseled to take medicine with meals and at least 1 hour before lying down.            Follow up in about 3 months (around  3/13/2023).    Intralesional Kenalog 0.5mg/cc (2 cc total) injected into >8 lesions on the body today after obtaining verbal consent including risk of surrounding hypopigmentation. Patient tolerated procedure well.  Units: 1.0  NDC for Kenalog 10mg/cc:  8837-2755-29

## 2022-12-13 NOTE — PATIENT INSTRUCTIONS
Anxiety  Excoriation (skin-picking) disorder  -Do not pick at these lesions .  To use dove soap or gentle soap recommended, followed by therapy steroid ointment to lesions at night or every am and pm( only twice daily), then apply cerave anti itch cream all over  -then if still itchy pick at it use the cerave anti-itch cream  -Find outlets that make you happy  -for lesions on the body.  Recommends bathing in the Cerave eczema creamy wash bites therapy or Dove or cetaphil gental wash , followed by using these therapy clobetasol, then cerave anti itch, use gloves at night to try not to pick scan keep the AC down on low, apply ice or CeraVe anti itch cream when needed.  In the a.m. reapply  the clobetasol  ointment if needed to lesions  - you can cover at night with cotton pjs and dampen areas or use saran wrap to areas trying least hour  a day  - Start N-acetylcystine over counter  Instructions for takin mg daily for one week  then 600 mg twice daily for one week  then 1200 mg every morning and 600 mg at night for one week  then 1200 mg twice daily  Anxiety  - Spent much time in counseling and conversation that the picking is due to anxiety. Discussed other ways of relieving stress such as reading a book, walking, cross words, anything to keep her hands busy      Dermal hypersensitivity reaction  Start Claritin 10 mg or Zyrtec 10 mg  Or allegra 25 mg  or Zyxal over the counter at breakfast and at lunch  At night take hydroxyzine  Apply steroid cream prn itching up to two times a day (every morning and evening) as needed, if itching in between use - Use cerave anti- itch cream as often a needed put in fridge. Ice will help with itch as well.       Superficial skin infection  -     doxycycline monohydrate 100 mg Tab; Take 1 tablet (100 mg total) by mouth 2 (two) times daily. for 10 days  Dispense: 20 tablet; Refill: 0  -     mupirocin (BACTROBAN) 2 % ointment; Apply topically 2 (two) times daily. To back and  nares  Dispense: 30 g; Refill: 0    Hx of MRSA  Instructed to do muprocin bid to nares and to lesion of back, cover lesion of back  And then do hibiclens to back   Once a week bathe body with hibclens   Other than that use dove soap    Discussed benefits and risks of doxycyline therapy including but not limited to GI discomfort, esophageal irritation/ulceration, and increased sun sensitivity. Patient was counseled to take medicine with meals and at least 1 hour before lying down.

## 2022-12-16 LAB — BACTERIA SPEC AEROBE CULT: ABNORMAL

## 2023-01-19 ENCOUNTER — TELEPHONE (OUTPATIENT)
Dept: BEHAVIORAL HEALTH | Facility: CLINIC | Age: 63
End: 2023-01-19

## 2023-01-26 NOTE — PROGRESS NOTES
"OBSTETRICS AND GYNECOLOGY    Chief Complaint:  Well Woman Exam     HPI:      Odilia Winslow is a 62 y.o.  who presents today for well woman exam.  LMP: Patient's last menstrual period was 2016. Patient denies abnormal vaginal bleeding, abnormal discharge/odor, pelvic pain, or dysuria/hematuria.  Ms. Winslow is not currently sexually active. She declines STD screening today. She denies additional acute issues, problems, or complaints.    OB History          6    Para   5    Term   4            AB   1    Living   3         SAB        IAB        Ectopic   1    Multiple        Live Births   3           Obstetric Comments   Menopause age 46             ROS:     GENERAL: Feeling well overall.   CARDIOVASCULAR: Denies chest pain.   RESP: Denies shortness of breath.  BREASTS: Denies lumps or nipple discharge.   URINARY: Denies dysuria, hematuria.  NEUROLOGIC: Denies syncope, falls.     Physical Exam:      PHYSICAL EXAM:  /60   Ht 5' 7" (1.702 m)   Wt 77 kg (169 lb 12.1 oz)   LMP 2016   BMI 26.59 kg/m²   Body mass index is 26.59 kg/m².     APPEARANCE:  Well nourished, well developed, in no acute distress.  Able to smile appropriately during our encounter. Makes eye contact. Pleasant.  PSYCH: Appropriate mood and affect.  SKIN:   No acne or hirsutism.  CARDIOVASCULAR:  No edema of peripheral extremities. Well perfused throughout.  RESP:  No accessory muscle use to breathe. Speaking comfortably in complete sentences.   BREASTS:  Symmetrical, with no visible skin lesions or scars, no palpable masses. No nipple discharge or peau d'orange bilaterally. No palpable axillary LAD.  ABDOMEN:  Soft.    PELVIC:  Normal external genitalia without lesions. Normal hair distribution. Adequate perineal body, normal urethral meatus. Vagina moist and rugated, minimal global atrophy. Without lesions, without discharge. Cervix 3x2cm, parous appearing, slightly friable with pap. Cervix pink, without " ectocervical lesions, discharge or tenderness.  No ectropion. No significant cystocele or rectocele.  Bimanual exam shows uterus to be normal size, regular, mobile and nontender. Adnexa without masses or tenderness.      Assessment/Plan:     Problem List Items Addressed This Visit    None  Visit Diagnoses       Encounter for well woman exam    -  Primary    Relevant Orders    Tdap Vaccine    Screening for cervical cancer        Relevant Orders    Liquid-Based Pap Smear, Screening    Screening for human papillomavirus (HPV)        Relevant Orders    HPV High Risk Genotypes, PCR    Screening mammogram for breast cancer        Relevant Orders    Mammo Digital Screening Bilat w/ Saud    Menopause        Relevant Medications    progesterone (PROMETRIUM) 100 MG capsule    Cold sore        Relevant Medications    valACYclovir (VALTREX) 1000 MG tablet          -- Counseled patient regarding healthy diet, weight, and regular exercise (gym, spinning), daily multivitamin, daily seat belt use.   -- BP normotensive  -- She denies abuse and feels safe at home.  -- Immunizations:  flu not obtained this season  -- Pap smear (10/2018):  NILM, HPV(-) (next due - obtained today)  -- On prometrium 100mg  Refills today  Helps with night sweats  -- STD screening:  declines   HIV NR 6/2021  -- Valtrex; uses for cold sores PRN; will refill rx from PCP today  -- MMG (10/2022):  BiRADS Category 1, TC risk 11.54% (next due 10/2023)  -- Colonoscopy (8/2012):  aware due, wants to wait until insurance change  -- DEXA screening (10/2022):  osteopenia [through PCP]  On Calcium/Vitamin D  -- Daughter in law due end of March! TDAP ordered, to obtain through pharmacy today  -- Patient of Dr. Perez      Follow up in about 1 year (around 2/1/2024) for WWE.    Counseling:     Patient was counseled today on current ASCCP pap guidelines, the recommendation for yearly physical exams, safe driving habits, breast self awareness and annual mammograms. She  is to see her PCP for other health maintenance.     Use of the [facility] Patient Portal discussed and encouraged during today's visit.                 As of April 1, 2021, the Cures Act has been passed nationally. This new law requires that all doctors progress notes, lab results, pathology reports and radiology reports be released IMMEDIATELY to the patient in the patient portal. That means that the results are released to you at the EXACT same time they are released to me. Therefore, with all of the patients that I have I am not able to reply to each patient exactly when the results come in. So there will be a delay from when you see the results to when I see them and have time to come up with a response to send you. Also I only see these results when I am on the computer at work. So if the results come in over the weekend or after 5 pm of a work day, I will not see them until the next business day. As you can tell, this is a challenge as a physician to give every patient the quick response they hope for and deserve. So please be patient!   Thanks for your understanding and patience.

## 2023-01-31 ENCOUNTER — TELEPHONE (OUTPATIENT)
Dept: OBSTETRICS AND GYNECOLOGY | Facility: CLINIC | Age: 63
End: 2023-01-31

## 2023-02-01 ENCOUNTER — OFFICE VISIT (OUTPATIENT)
Dept: OBSTETRICS AND GYNECOLOGY | Facility: CLINIC | Age: 63
End: 2023-02-01
Payer: COMMERCIAL

## 2023-02-01 VITALS
SYSTOLIC BLOOD PRESSURE: 120 MMHG | WEIGHT: 169.75 LBS | HEIGHT: 67 IN | DIASTOLIC BLOOD PRESSURE: 60 MMHG | BODY MASS INDEX: 26.64 KG/M2

## 2023-02-01 DIAGNOSIS — Z12.31 SCREENING MAMMOGRAM FOR BREAST CANCER: ICD-10-CM

## 2023-02-01 DIAGNOSIS — Z01.419 ENCOUNTER FOR WELL WOMAN EXAM: Primary | ICD-10-CM

## 2023-02-01 DIAGNOSIS — Z12.4 SCREENING FOR CERVICAL CANCER: ICD-10-CM

## 2023-02-01 DIAGNOSIS — Z11.51 SCREENING FOR HUMAN PAPILLOMAVIRUS (HPV): ICD-10-CM

## 2023-02-01 DIAGNOSIS — B00.1 COLD SORE: ICD-10-CM

## 2023-02-01 DIAGNOSIS — Z78.0 MENOPAUSE: ICD-10-CM

## 2023-02-01 PROCEDURE — 99386 PREV VISIT NEW AGE 40-64: CPT | Mod: S$GLB,,, | Performed by: STUDENT IN AN ORGANIZED HEALTH CARE EDUCATION/TRAINING PROGRAM

## 2023-02-01 PROCEDURE — 1160F RVW MEDS BY RX/DR IN RCRD: CPT | Mod: CPTII,S$GLB,, | Performed by: STUDENT IN AN ORGANIZED HEALTH CARE EDUCATION/TRAINING PROGRAM

## 2023-02-01 PROCEDURE — 3008F BODY MASS INDEX DOCD: CPT | Mod: CPTII,S$GLB,, | Performed by: STUDENT IN AN ORGANIZED HEALTH CARE EDUCATION/TRAINING PROGRAM

## 2023-02-01 PROCEDURE — 3074F PR MOST RECENT SYSTOLIC BLOOD PRESSURE < 130 MM HG: ICD-10-PCS | Mod: CPTII,S$GLB,, | Performed by: STUDENT IN AN ORGANIZED HEALTH CARE EDUCATION/TRAINING PROGRAM

## 2023-02-01 PROCEDURE — 1159F MED LIST DOCD IN RCRD: CPT | Mod: CPTII,S$GLB,, | Performed by: STUDENT IN AN ORGANIZED HEALTH CARE EDUCATION/TRAINING PROGRAM

## 2023-02-01 PROCEDURE — 99386 PR PREVENTIVE VISIT,NEW,40-64: ICD-10-PCS | Mod: S$GLB,,, | Performed by: STUDENT IN AN ORGANIZED HEALTH CARE EDUCATION/TRAINING PROGRAM

## 2023-02-01 PROCEDURE — 4010F PR ACE/ARB THEARPY RXD/TAKEN: ICD-10-PCS | Mod: CPTII,S$GLB,, | Performed by: STUDENT IN AN ORGANIZED HEALTH CARE EDUCATION/TRAINING PROGRAM

## 2023-02-01 PROCEDURE — 99999 PR PBB SHADOW E&M-EST. PATIENT-LVL III: CPT | Mod: PBBFAC,,, | Performed by: STUDENT IN AN ORGANIZED HEALTH CARE EDUCATION/TRAINING PROGRAM

## 2023-02-01 PROCEDURE — 3074F SYST BP LT 130 MM HG: CPT | Mod: CPTII,S$GLB,, | Performed by: STUDENT IN AN ORGANIZED HEALTH CARE EDUCATION/TRAINING PROGRAM

## 2023-02-01 PROCEDURE — 3008F PR BODY MASS INDEX (BMI) DOCUMENTED: ICD-10-PCS | Mod: CPTII,S$GLB,, | Performed by: STUDENT IN AN ORGANIZED HEALTH CARE EDUCATION/TRAINING PROGRAM

## 2023-02-01 PROCEDURE — 4010F ACE/ARB THERAPY RXD/TAKEN: CPT | Mod: CPTII,S$GLB,, | Performed by: STUDENT IN AN ORGANIZED HEALTH CARE EDUCATION/TRAINING PROGRAM

## 2023-02-01 PROCEDURE — 99999 PR PBB SHADOW E&M-EST. PATIENT-LVL III: ICD-10-PCS | Mod: PBBFAC,,, | Performed by: STUDENT IN AN ORGANIZED HEALTH CARE EDUCATION/TRAINING PROGRAM

## 2023-02-01 PROCEDURE — 3078F DIAST BP <80 MM HG: CPT | Mod: CPTII,S$GLB,, | Performed by: STUDENT IN AN ORGANIZED HEALTH CARE EDUCATION/TRAINING PROGRAM

## 2023-02-01 PROCEDURE — 88142 CYTOPATH C/V THIN LAYER: CPT | Performed by: STUDENT IN AN ORGANIZED HEALTH CARE EDUCATION/TRAINING PROGRAM

## 2023-02-01 PROCEDURE — 3078F PR MOST RECENT DIASTOLIC BLOOD PRESSURE < 80 MM HG: ICD-10-PCS | Mod: CPTII,S$GLB,, | Performed by: STUDENT IN AN ORGANIZED HEALTH CARE EDUCATION/TRAINING PROGRAM

## 2023-02-01 PROCEDURE — 1159F PR MEDICATION LIST DOCUMENTED IN MEDICAL RECORD: ICD-10-PCS | Mod: CPTII,S$GLB,, | Performed by: STUDENT IN AN ORGANIZED HEALTH CARE EDUCATION/TRAINING PROGRAM

## 2023-02-01 PROCEDURE — 87624 HPV HI-RISK TYP POOLED RSLT: CPT | Performed by: STUDENT IN AN ORGANIZED HEALTH CARE EDUCATION/TRAINING PROGRAM

## 2023-02-01 PROCEDURE — 1160F PR REVIEW ALL MEDS BY PRESCRIBER/CLIN PHARMACIST DOCUMENTED: ICD-10-PCS | Mod: CPTII,S$GLB,, | Performed by: STUDENT IN AN ORGANIZED HEALTH CARE EDUCATION/TRAINING PROGRAM

## 2023-02-01 RX ORDER — VALACYCLOVIR HYDROCHLORIDE 1 G/1
TABLET, FILM COATED ORAL
Qty: 30 TABLET | Refills: 2 | Status: SHIPPED | OUTPATIENT
Start: 2023-02-01

## 2023-02-01 RX ORDER — TRIAMCINOLONE ACETONIDE 1 MG/G
OINTMENT TOPICAL
COMMUNITY
Start: 2022-10-05

## 2023-02-01 RX ORDER — PROGESTERONE 100 MG/1
CAPSULE ORAL
Qty: 90 CAPSULE | Refills: 3 | Status: SHIPPED | OUTPATIENT
Start: 2023-02-01 | End: 2023-12-11

## 2023-02-03 ENCOUNTER — PATIENT MESSAGE (OUTPATIENT)
Dept: DERMATOLOGY | Facility: CLINIC | Age: 63
End: 2023-02-03

## 2023-02-08 LAB
HPV HR 12 DNA SPEC QL NAA+PROBE: NEGATIVE
HPV16 AG SPEC QL: NEGATIVE
HPV18 DNA SPEC QL NAA+PROBE: NEGATIVE

## 2023-02-09 LAB
FINAL PATHOLOGIC DIAGNOSIS: NORMAL
Lab: NORMAL

## 2023-02-12 ENCOUNTER — PATIENT MESSAGE (OUTPATIENT)
Dept: DERMATOLOGY | Facility: CLINIC | Age: 63
End: 2023-02-12

## 2023-02-12 ENCOUNTER — PATIENT MESSAGE (OUTPATIENT)
Dept: OBSTETRICS AND GYNECOLOGY | Facility: CLINIC | Age: 63
End: 2023-02-12

## 2023-02-14 ENCOUNTER — TELEPHONE (OUTPATIENT)
Dept: DERMATOLOGY | Facility: CLINIC | Age: 63
End: 2023-02-14

## 2023-02-15 ENCOUNTER — OFFICE VISIT (OUTPATIENT)
Dept: DERMATOLOGY | Facility: CLINIC | Age: 63
End: 2023-02-15
Payer: COMMERCIAL

## 2023-02-15 DIAGNOSIS — L23.9 DERMAL HYPERSENSITIVITY REACTION: Primary | ICD-10-CM

## 2023-02-15 DIAGNOSIS — F42.4 SKIN-PICKING DISORDER: ICD-10-CM

## 2023-02-15 DIAGNOSIS — F41.9 ANXIETY: ICD-10-CM

## 2023-02-15 DIAGNOSIS — L28.1 PRURIGO NODULARIS: ICD-10-CM

## 2023-02-15 DIAGNOSIS — L98.9 DISEASE OF SKIN AND SUBCUTANEOUS TISSUE: ICD-10-CM

## 2023-02-15 PROCEDURE — 99215 OFFICE O/P EST HI 40 MIN: CPT | Mod: 25,S$GLB,, | Performed by: DERMATOLOGY

## 2023-02-15 PROCEDURE — 1159F MED LIST DOCD IN RCRD: CPT | Mod: CPTII,S$GLB,, | Performed by: DERMATOLOGY

## 2023-02-15 PROCEDURE — 4010F ACE/ARB THERAPY RXD/TAKEN: CPT | Mod: CPTII,S$GLB,, | Performed by: DERMATOLOGY

## 2023-02-15 PROCEDURE — 99215 PR OFFICE/OUTPT VISIT, EST, LEVL V, 40-54 MIN: ICD-10-PCS | Mod: 25,S$GLB,, | Performed by: DERMATOLOGY

## 2023-02-15 PROCEDURE — 11901 PR INJECTION, SKIN LESIONS, 8 OR MORE: ICD-10-PCS | Mod: S$GLB,,, | Performed by: DERMATOLOGY

## 2023-02-15 PROCEDURE — 4010F PR ACE/ARB THEARPY RXD/TAKEN: ICD-10-PCS | Mod: CPTII,S$GLB,, | Performed by: DERMATOLOGY

## 2023-02-15 PROCEDURE — 11901 INJECT SKIN LESIONS >7: CPT | Mod: S$GLB,,, | Performed by: DERMATOLOGY

## 2023-02-15 PROCEDURE — 99999 PR PBB SHADOW E&M-EST. PATIENT-LVL III: ICD-10-PCS | Mod: PBBFAC,,, | Performed by: DERMATOLOGY

## 2023-02-15 PROCEDURE — 99999 PR PBB SHADOW E&M-EST. PATIENT-LVL III: CPT | Mod: PBBFAC,,, | Performed by: DERMATOLOGY

## 2023-02-15 PROCEDURE — 1159F PR MEDICATION LIST DOCUMENTED IN MEDICAL RECORD: ICD-10-PCS | Mod: CPTII,S$GLB,, | Performed by: DERMATOLOGY

## 2023-02-15 RX ORDER — HYDROXYZINE HYDROCHLORIDE 25 MG/1
25 TABLET, FILM COATED ORAL NIGHTLY
Qty: 30 TABLET | Refills: 2 | Status: ON HOLD | OUTPATIENT
Start: 2023-02-15 | End: 2023-06-18 | Stop reason: HOSPADM

## 2023-02-15 RX ORDER — CLOBETASOL PROPIONATE 0.5 MG/G
OINTMENT TOPICAL
Qty: 60 G | Refills: 3 | Status: SHIPPED | OUTPATIENT
Start: 2023-02-15 | End: 2023-03-17 | Stop reason: SDUPTHER

## 2023-02-15 NOTE — Clinical Note
Patient may be getting a progesterone induced dermatitis from hormones. I would like to see if we could take her off and if it gets better

## 2023-02-15 NOTE — PROGRESS NOTES
Subjective:       Patient ID:  Odilia Winslow is a 62 y.o. female who presents for   Chief Complaint   Patient presents with    Follow-up     Follow-up - Follow-up  Symptom course: unchanged  Affected locations: left upper leg, right upper leg, left lower leg, right lower leg, left arm and right arm  Signs / symptoms: itching, redness, tender and dryness  Severity: mild      Review of Systems   Constitutional:  Negative for fever, chills, fatigue and malaise.   Skin:  Positive for itching and rash.   Psychiatric/Behavioral:  Negative for anxiety and high stress ( with cancer).    Hematologic/Lymphatic: Bruises/bleeds easily.      Objective:    Physical Exam   Constitutional: She appears well-developed and well-nourished. No distress.   Neurological: She is alert and oriented to person, place, and time. She is not disoriented.   Psychiatric: She has a normal mood and affect.   Skin:   Areas Examined (abnormalities noted in diagram):   RUE Inspected  LUE Inspection Performed  RLE Inspected  LLE Inspection Performed                Diagram Legend     Erythematous scaling macule/papule c/w actinic keratosis       Vascular papule c/w angioma      Pigmented verrucoid papule/plaque c/w seborrheic keratosis      Yellow umbilicated papule c/w sebaceous hyperplasia      Irregularly shaped tan macule c/w lentigo     1-2 mm smooth white papules consistent with Milia      Movable subcutaneous cyst with punctum c/w epidermal inclusion cyst      Subcutaneous movable cyst c/w pilar cyst      Firm pink to brown papule c/w dermatofibroma      Pedunculated fleshy papule(s) c/w skin tag(s)      Evenly pigmented macule c/w junctional nevus     Mildly variegated pigmented, slightly irregular-bordered macule c/w mildly atypical nevus      Flesh colored to evenly pigmented papule c/w intradermal nevus       Pink pearly papule/plaque c/w basal cell carcinoma      Erythematous hyperkeratotic cursted plaque c/w SCC      Surgical  scar with no sign of skin cancer recurrence      Open and closed comedones      Inflammatory papules and pustules      Verrucoid papule consistent consistent with wart     Erythematous eczematous patches and plaques     Dystrophic onycholytic nail with subungual debris c/w onychomycosis     Umbilicated papule    Erythematous-base heme-crusted tan verrucoid plaque consistent with inflamed seborrheic keratosis     Erythematous Silvery Scaling Plaque c/w Psoriasis     See annotation      Assessment / Plan:        Dermal hypersensitivity reaction  -     clobetasol 0.05% (TEMOVATE) 0.05 % Oint; AAA bid avoid face/groin  - Possibly caused by progesterone, after reviewing meds again today will message and discuss with Obgyn   Re start Claritin 10 mg or Zyrtec 10 mg  Or allegra 25 mg  or Zyxal over the counter at breakfast and at lunch ( was taking and then stopped now flaring)  At night take hydroxyzine      Lengthy discussion on importance of continuing antihistamines and how it will stop rash from coming   Apply steroid cream prn itching up to two times a day (every morning and evening) as needed, if itching in between use - Use cerave anti- itch cream as often a needed put in fridge. Ice will help with itch as well.     Counseling on topical steroids:  Patient counseled that the prolonged use of topical steroids can result in the increased appearance superficial blood vessels (telangiectasias) lightening (hypopigmentation), and   thinning of the skin ( atrophy).  Patient understands to avoid using high potency steroids in skin folds, the groin or the face.  The patient verbalized understanding of proper use and possible adverse effects of topical steroids.  All patient's questions and concerns were addressed.       Vitamin D def  - take vitamin D as  can cause itch/hypersensitivity , low vitamin, followed by PCP      Anxiety  - discussed with PCP about CBT ? Currently on sertraline 20 mg daily  - causing a lot of  picking     Excoriation (skin-picking) disorder  -Do not pick at these lesions .  To use dove soap or gentle soap recommended, followed by therapy steroid ointment to lesions at night or every am and pm( only twice daily), then apply cerave anti itch cream all over  -then if still itchy pick at it use the cerave anti-itch cream  -Find outlets that make you happy  -for lesions on the body.  Recommends bathing in the Cerave eczema creamy wash bites therapy or Dove or cetaphil gental wash , followed by using these therapy clobetasol, then cerave anti itch, use gloves at night to try not to pick scan keep the AC down on low, apply ice or CeraVe anti itch cream when needed.  In the a.m. reapply  the clobetasol  ointment if needed to lesions  - you can cover at night with cotton pjs and dampen areas or use saran wrap to areas trying least hour  a day  - Start N-acetylcystine over counter  Instructions for takin mg daily for one week  then 600 mg twice daily for one week  then 1200 mg every morning and 600 mg at night for one week  then 1200 mg twice daily    Prurigo Nodularis  -     IA [id number] ACETONIDE INJ PER 10MG  -     triamcinolone acetonide injection 10 mg  -     IA INJECTION, SKIN LESIONS, 8 OR MORE  Intralesional Kenalog 0.5mg/cc (2 cc total) injected into >8 lesions on the body today after obtaining verbal consent including risk of surrounding hypopigmentation. Patient tolerated procedure well.  Units: 1.0  NDC for Kenalog 10mg/cc:  2405-2287-83    Will discuss dupixent if not better in 3 months                 No follow-ups on file.

## 2023-02-15 NOTE — PATIENT INSTRUCTIONS
Start Claritin 10 mg or Zyrtec 10 mg  Or allegra 25 mg  or Zyxal over the counter at breakfast and at lunch  At night take hydroxyzine  Apply steroid cream prn itching up to two times a day (every morning and evening) as needed, if itching in between use - Use cerave anti- itch cream as often a needed put in fridge. Ice will help with itch as well.      Vitamin D def  - take vitamin D as  can cause itch/hypersensitivity , low vitamin, followed by PCP      Anxiety  Excoriation (skin-picking) disorder  -Do not pick at these lesions .  To use dove soap or gentle soap recommended, followed by therapy steroid ointment to lesions at night or every am and pm( only twice daily), then apply cerave anti itch cream all over  -then if still itchy pick at it use the cerave anti-itch cream  -Find outlets that make you happy  -for lesions on the body.  Recommends bathing in the Cerave eczema creamy wash bites therapy or Dove or cetaphil gental wash , followed by using these therapy clobetasol, then cerave anti itch, use gloves at night to try not to pick scan keep the AC down on low, apply ice or CeraVe anti itch cream when needed.  In the a.m. reapply  the clobetasol  ointment if needed to lesions  - you can cover at night with cotton pjs and dampen areas or use saran wrap to areas trying least hour  a day  - Start N-acetylcystine over counter  Instructions for takin mg daily for one week  then 600 mg twice daily for one week  then 1200 mg every morning and 600 mg at night for one week  then 1200 mg twice daily

## 2023-02-15 NOTE — Clinical Note
We discussed figit toys she said you suggested. I think she needs cognitive behavior therapy as she keeps picking and they don't heal. Discussed trying to keep hands busy but seems so hard for her

## 2023-02-20 ENCOUNTER — PATIENT MESSAGE (OUTPATIENT)
Dept: DERMATOLOGY | Facility: CLINIC | Age: 63
End: 2023-02-20

## 2023-03-17 DIAGNOSIS — L98.9 DISEASE OF SKIN AND SUBCUTANEOUS TISSUE: ICD-10-CM

## 2023-03-17 RX ORDER — CLOBETASOL PROPIONATE 0.5 MG/G
OINTMENT TOPICAL
Qty: 60 G | Refills: 3 | Status: SHIPPED | OUTPATIENT
Start: 2023-03-17 | End: 2023-04-13 | Stop reason: SDUPTHER

## 2023-04-05 ENCOUNTER — PATIENT MESSAGE (OUTPATIENT)
Dept: DERMATOLOGY | Facility: CLINIC | Age: 63
End: 2023-04-05
Payer: MEDICARE

## 2023-04-13 DIAGNOSIS — L98.9 DISEASE OF SKIN AND SUBCUTANEOUS TISSUE: ICD-10-CM

## 2023-04-13 RX ORDER — CLOBETASOL PROPIONATE 0.5 MG/G
OINTMENT TOPICAL
Qty: 60 G | Refills: 3 | Status: SHIPPED | OUTPATIENT
Start: 2023-04-13

## 2023-06-08 ENCOUNTER — TELEPHONE (OUTPATIENT)
Dept: INTERNAL MEDICINE | Facility: CLINIC | Age: 63
End: 2023-06-08
Payer: MEDICARE

## 2023-06-08 NOTE — TELEPHONE ENCOUNTER
----- Message from Manan Ziegler sent at 6/6/2023  4:14 PM CDT -----  Name of Who is Calling: JUDY RAMÍREZ [[id number]]                What is the request in detail: Pt has pain in her neck, back and knee and she having a hard time walking and also has Nausea.Please call back to further assist.                 Can the clinic reply by [facility]: No                What Number to Call Back if not in [facility]: [phone]

## 2023-06-17 ENCOUNTER — HOSPITAL ENCOUNTER (OUTPATIENT)
Facility: HOSPITAL | Age: 63
Discharge: HOME | End: 2023-06-18
Attending: EMERGENCY MEDICINE | Admitting: EMERGENCY MEDICINE
Payer: MEDICARE

## 2023-06-17 DIAGNOSIS — R55 SYNCOPE: ICD-10-CM

## 2023-06-17 DIAGNOSIS — R40.20 LOSS OF CONSCIOUSNESS: ICD-10-CM

## 2023-06-17 DIAGNOSIS — R55 SYNCOPE AND COLLAPSE: Primary | ICD-10-CM

## 2023-06-17 DIAGNOSIS — R07.9 CHEST PAIN: ICD-10-CM

## 2023-06-17 DIAGNOSIS — I10 ESSENTIAL HYPERTENSION: ICD-10-CM

## 2023-06-17 PROCEDURE — 93010 EKG 12-LEAD: ICD-10-PCS | Mod: ,,, | Performed by: INTERNAL MEDICINE

## 2023-06-17 PROCEDURE — 94761 N-INVAS EAR/PLS OXIMETRY MLT: CPT

## 2023-06-17 PROCEDURE — 93010 ELECTROCARDIOGRAM REPORT: CPT | Mod: ,,, | Performed by: INTERNAL MEDICINE

## 2023-06-17 PROCEDURE — 93005 ELECTROCARDIOGRAM TRACING: CPT

## 2023-06-17 PROCEDURE — 83880 ASSAY OF NATRIURETIC PEPTIDE: CPT

## 2023-06-17 PROCEDURE — 99285 EMERGENCY DEPT VISIT HI MDM: CPT | Mod: ,,, | Performed by: EMERGENCY MEDICINE

## 2023-06-17 PROCEDURE — 84484 ASSAY OF TROPONIN QUANT: CPT

## 2023-06-17 PROCEDURE — 85025 COMPLETE CBC W/AUTO DIFF WBC: CPT

## 2023-06-17 PROCEDURE — 99285 EMERGENCY DEPT VISIT HI MDM: CPT | Mod: 25

## 2023-06-17 PROCEDURE — 80053 COMPREHEN METABOLIC PANEL: CPT

## 2023-06-17 PROCEDURE — 99285 PR EMERGENCY DEPT VISIT,LEVEL V: ICD-10-PCS | Mod: ,,, | Performed by: EMERGENCY MEDICINE

## 2023-06-18 VITALS
BODY MASS INDEX: 26.53 KG/M2 | SYSTOLIC BLOOD PRESSURE: 149 MMHG | WEIGHT: 169 LBS | HEART RATE: 79 BPM | HEIGHT: 67 IN | DIASTOLIC BLOOD PRESSURE: 68 MMHG | TEMPERATURE: 99 F | RESPIRATION RATE: 14 BRPM | OXYGEN SATURATION: 94 %

## 2023-06-18 PROBLEM — E87.1 HYPONATREMIA: Status: ACTIVE | Noted: 2023-06-18

## 2023-06-18 PROBLEM — R55 SYNCOPE AND COLLAPSE: Status: ACTIVE | Noted: 2023-06-18

## 2023-06-18 LAB
ALBUMIN SERPL BCP-MCNC: 3.3 G/DL (ref 3.5–5.2)
ALP SERPL-CCNC: 69 U/L (ref 55–135)
ALT SERPL W/O P-5'-P-CCNC: 16 U/L (ref 10–44)
AMPHET+METHAMPHET UR QL: NEGATIVE
ANION GAP SERPL CALC-SCNC: 10 MMOL/L (ref 8–16)
ANION GAP SERPL CALC-SCNC: 12 MMOL/L (ref 8–16)
ASCENDING AORTA: 2.4 CM
AST SERPL-CCNC: 25 U/L (ref 10–40)
AV INDEX (PROSTH): 0.65
AV MEAN GRADIENT: 5 MMHG
AV PEAK GRADIENT: 9 MMHG
AV VALVE AREA: 2.26 CM2
AV VELOCITY RATIO: 0.79
BARBITURATES UR QL SCN>200 NG/ML: NEGATIVE
BASOPHILS # BLD AUTO: 0.07 K/UL (ref 0–0.2)
BASOPHILS NFR BLD: 0.8 % (ref 0–1.9)
BENZODIAZ UR QL SCN>200 NG/ML: NEGATIVE
BILIRUB SERPL-MCNC: 0.2 MG/DL (ref 0.1–1)
BNP SERPL-MCNC: 15 PG/ML (ref 0–99)
BSA FOR ECHO PROCEDURE: 1.9 M2
BUN SERPL-MCNC: 10 MG/DL (ref 8–23)
BUN SERPL-MCNC: 7 MG/DL (ref 8–23)
BZE UR QL SCN: NEGATIVE
CALCIUM SERPL-MCNC: 8.9 MG/DL (ref 8.7–10.5)
CALCIUM SERPL-MCNC: 9.2 MG/DL (ref 8.7–10.5)
CANNABINOIDS UR QL SCN: NEGATIVE
CHLORIDE SERPL-SCNC: 110 MMOL/L (ref 95–110)
CHLORIDE SERPL-SCNC: 99 MMOL/L (ref 95–110)
CO2 SERPL-SCNC: 17 MMOL/L (ref 23–29)
CO2 SERPL-SCNC: 19 MMOL/L (ref 23–29)
CREAT SERPL-MCNC: 0.6 MG/DL (ref 0.5–1.4)
CREAT SERPL-MCNC: 0.7 MG/DL (ref 0.5–1.4)
CREAT UR-MCNC: 14 MG/DL (ref 15–325)
CV ECHO LV RWT: 0.41 CM
DIFFERENTIAL METHOD: ABNORMAL
DOP CALC AO PEAK VEL: 1.47 M/S
DOP CALC AO VTI: 32.11 CM
DOP CALC LVOT AREA: 3.5 CM2
DOP CALC LVOT DIAMETER: 2.11 CM
DOP CALC LVOT PEAK VEL: 1.16 M/S
DOP CALC LVOT STROKE VOLUME: 72.48 CM3
DOP CALCLVOT PEAK VEL VTI: 20.74 CM
E WAVE DECELERATION TIME: 120.11 MSEC
E/A RATIO: 1.03
E/E' RATIO: 8.71 M/S
ECHO LV POSTERIOR WALL: 0.94 CM (ref 0.6–1.1)
EJECTION FRACTION: 65 %
EOSINOPHIL # BLD AUTO: 0.3 K/UL (ref 0–0.5)
EOSINOPHIL NFR BLD: 3.2 % (ref 0–8)
ERYTHROCYTE [DISTWIDTH] IN BLOOD BY AUTOMATED COUNT: 13 % (ref 11.5–14.5)
EST. GFR  (NO RACE VARIABLE): >60 ML/MIN/1.73 M^2
EST. GFR  (NO RACE VARIABLE): >60 ML/MIN/1.73 M^2
ETHANOL SERPL-MCNC: 217 MG/DL
FRACTIONAL SHORTENING: 33 % (ref 28–44)
GLUCOSE SERPL-MCNC: 110 MG/DL (ref 70–110)
GLUCOSE SERPL-MCNC: 86 MG/DL (ref 70–110)
HCT VFR BLD AUTO: 36.4 % (ref 37–48.5)
HGB BLD-MCNC: 11.9 G/DL (ref 12–16)
IMM GRANULOCYTES # BLD AUTO: 0.15 K/UL (ref 0–0.04)
IMM GRANULOCYTES NFR BLD AUTO: 1.8 % (ref 0–0.5)
INTERVENTRICULAR SEPTUM: 1.12 CM (ref 0.6–1.1)
IVRT: 93.24 MSEC
LA MAJOR: 4.7 CM
LA MINOR: 4.96 CM
LA WIDTH: 3.7 CM
LEFT ATRIUM SIZE: 4.06 CM
LEFT ATRIUM VOLUME INDEX MOD: 26.2 ML/M2
LEFT ATRIUM VOLUME INDEX: 32.8 ML/M2
LEFT ATRIUM VOLUME MOD: 49.32 CM3
LEFT ATRIUM VOLUME: 61.63 CM3
LEFT INTERNAL DIMENSION IN SYSTOLE: 3.06 CM (ref 2.1–4)
LEFT VENTRICLE DIASTOLIC VOLUME INDEX: 51.24 ML/M2
LEFT VENTRICLE DIASTOLIC VOLUME: 96.33 ML
LEFT VENTRICLE MASS INDEX: 87 G/M2
LEFT VENTRICLE SYSTOLIC VOLUME INDEX: 19.5 ML/M2
LEFT VENTRICLE SYSTOLIC VOLUME: 36.72 ML
LEFT VENTRICULAR INTERNAL DIMENSION IN DIASTOLE: 4.58 CM (ref 3.5–6)
LEFT VENTRICULAR MASS: 164.24 G
LV LATERAL E/E' RATIO: 8.22 M/S
LV SEPTAL E/E' RATIO: 9.25 M/S
LYMPHOCYTES # BLD AUTO: 1.6 K/UL (ref 1–4.8)
LYMPHOCYTES NFR BLD: 18.8 % (ref 18–48)
MCH RBC QN AUTO: 30.9 PG (ref 27–31)
MCHC RBC AUTO-ENTMCNC: 32.7 G/DL (ref 32–36)
MCV RBC AUTO: 95 FL (ref 82–98)
METHADONE UR QL SCN>300 NG/ML: NEGATIVE
MONOCYTES # BLD AUTO: 0.8 K/UL (ref 0.3–1)
MONOCYTES NFR BLD: 9 % (ref 4–15)
MV PEAK A VEL: 0.72 M/S
MV PEAK E VEL: 0.74 M/S
MV STENOSIS PRESSURE HALF TIME: 34.83 MS
MV VALVE AREA P 1/2 METHOD: 6.32 CM2
NEUTROPHILS # BLD AUTO: 5.5 K/UL (ref 1.8–7.7)
NEUTROPHILS NFR BLD: 66.4 % (ref 38–73)
NRBC BLD-RTO: 0 /100 WBC
OPIATES UR QL SCN: NEGATIVE
OSMOLALITY SERPL: 322 MOSM/KG (ref 275–295)
OSMOLALITY UR: 108 MOSM/KG (ref 50–1200)
PCP UR QL SCN>25 NG/ML: NEGATIVE
PISA TR MAX VEL: 2.51 M/S
PLATELET # BLD AUTO: 425 K/UL (ref 150–450)
PMV BLD AUTO: 8.9 FL (ref 9.2–12.9)
POC CARDIAC TROPONIN I: 0 NG/ML (ref 0–0.08)
POC CARDIAC TROPONIN I: 0 NG/ML (ref 0–0.08)
POTASSIUM SERPL-SCNC: 3.3 MMOL/L (ref 3.5–5.1)
POTASSIUM SERPL-SCNC: 4.4 MMOL/L (ref 3.5–5.1)
PROT SERPL-MCNC: 6.6 G/DL (ref 6–8.4)
RA MAJOR: 4.64 CM
RA PRESSURE: 3 MMHG
RA WIDTH: 3.38 CM
RBC # BLD AUTO: 3.85 M/UL (ref 4–5.4)
RIGHT VENTRICULAR END-DIASTOLIC DIMENSION: 2.4 CM
SAMPLE: NORMAL
SAMPLE: NORMAL
SINUS: 2.59 CM
SODIUM SERPL-SCNC: 128 MMOL/L (ref 136–145)
SODIUM SERPL-SCNC: 139 MMOL/L (ref 136–145)
SODIUM UR-SCNC: <10 MMOL/L (ref 20–250)
STJ: 2.17 CM
TDI LATERAL: 0.09 M/S
TDI SEPTAL: 0.08 M/S
TDI: 0.09 M/S
TOXICOLOGY INFORMATION: ABNORMAL
TR MAX PG: 25 MMHG
TRICUSPID ANNULAR PLANE SYSTOLIC EXCURSION: 2.55 CM
TROPONIN I SERPL DL<=0.01 NG/ML-MCNC: <0.006 NG/ML (ref 0–0.03)
TROPONIN I SERPL DL<=0.01 NG/ML-MCNC: <0.006 NG/ML (ref 0–0.03)
TV REST PULMONARY ARTERY PRESSURE: 28 MMHG
WBC # BLD AUTO: 8.31 K/UL (ref 3.9–12.7)

## 2023-06-18 PROCEDURE — 36415 COLL VENOUS BLD VENIPUNCTURE: CPT

## 2023-06-18 PROCEDURE — 25000003 PHARM REV CODE 250

## 2023-06-18 PROCEDURE — 80307 DRUG TEST PRSMV CHEM ANLYZR: CPT | Performed by: EMERGENCY MEDICINE

## 2023-06-18 PROCEDURE — 96361 HYDRATE IV INFUSION ADD-ON: CPT

## 2023-06-18 PROCEDURE — 99223 1ST HOSP IP/OBS HIGH 75: CPT | Mod: GC,,, | Performed by: HOSPITALIST

## 2023-06-18 PROCEDURE — 25500020 PHARM REV CODE 255: Performed by: EMERGENCY MEDICINE

## 2023-06-18 PROCEDURE — 84300 ASSAY OF URINE SODIUM: CPT

## 2023-06-18 PROCEDURE — 84484 ASSAY OF TROPONIN QUANT: CPT

## 2023-06-18 PROCEDURE — 96360 HYDRATION IV INFUSION INIT: CPT

## 2023-06-18 PROCEDURE — G0378 HOSPITAL OBSERVATION PER HR: HCPCS

## 2023-06-18 PROCEDURE — 82077 ASSAY SPEC XCP UR&BREATH IA: CPT | Performed by: EMERGENCY MEDICINE

## 2023-06-18 PROCEDURE — 83935 ASSAY OF URINE OSMOLALITY: CPT

## 2023-06-18 PROCEDURE — 80048 BASIC METABOLIC PNL TOTAL CA: CPT

## 2023-06-18 PROCEDURE — 99223 PR INITIAL HOSPITAL CARE,LEVL III: ICD-10-PCS | Mod: GC,,, | Performed by: HOSPITALIST

## 2023-06-18 PROCEDURE — 83930 ASSAY OF BLOOD OSMOLALITY: CPT

## 2023-06-18 RX ORDER — TALC
6 POWDER (GRAM) TOPICAL NIGHTLY PRN
Status: DISCONTINUED | OUTPATIENT
Start: 2023-06-18 | End: 2023-06-18 | Stop reason: HOSPADM

## 2023-06-18 RX ORDER — ATORVASTATIN CALCIUM 40 MG/1
80 TABLET, FILM COATED ORAL NIGHTLY
Status: DISCONTINUED | OUTPATIENT
Start: 2023-06-18 | End: 2023-06-18 | Stop reason: HOSPADM

## 2023-06-18 RX ORDER — GLUCAGON 1 MG
1 KIT INJECTION
Status: DISCONTINUED | OUTPATIENT
Start: 2023-06-18 | End: 2023-06-18 | Stop reason: HOSPADM

## 2023-06-18 RX ORDER — ESCITALOPRAM OXALATE 20 MG/1
20 TABLET ORAL DAILY
Status: DISCONTINUED | OUTPATIENT
Start: 2023-06-18 | End: 2023-06-18 | Stop reason: HOSPADM

## 2023-06-18 RX ORDER — VALSARTAN 160 MG/1
320 TABLET ORAL EVERY MORNING
Status: DISCONTINUED | OUTPATIENT
Start: 2023-06-18 | End: 2023-06-18

## 2023-06-18 RX ORDER — DEXTROSE 40 %
30 GEL (GRAM) ORAL
Status: DISCONTINUED | OUTPATIENT
Start: 2023-06-18 | End: 2023-06-18 | Stop reason: HOSPADM

## 2023-06-18 RX ORDER — AMLODIPINE BESYLATE 10 MG/1
10 TABLET ORAL DAILY
Status: DISCONTINUED | OUTPATIENT
Start: 2023-06-18 | End: 2023-06-18

## 2023-06-18 RX ORDER — VALSARTAN 80 MG/1
80 TABLET ORAL DAILY
Qty: 90 TABLET | Refills: 3 | Status: SHIPPED | OUTPATIENT
Start: 2023-06-18 | End: 2023-06-18 | Stop reason: SDUPTHER

## 2023-06-18 RX ORDER — ACETAMINOPHEN 500 MG
1000 TABLET ORAL
Status: COMPLETED | OUTPATIENT
Start: 2023-06-18 | End: 2023-06-18

## 2023-06-18 RX ORDER — POTASSIUM CHLORIDE 750 MG/1
30 CAPSULE, EXTENDED RELEASE ORAL ONCE
Status: COMPLETED | OUTPATIENT
Start: 2023-06-18 | End: 2023-06-18

## 2023-06-18 RX ORDER — AMLODIPINE BESYLATE 10 MG/1
10 TABLET ORAL DAILY
Qty: 90 TABLET | Refills: 3 | Status: SHIPPED | OUTPATIENT
Start: 2023-06-18 | End: 2023-06-21

## 2023-06-18 RX ORDER — DEXTROSE 40 %
15 GEL (GRAM) ORAL
Status: DISCONTINUED | OUTPATIENT
Start: 2023-06-18 | End: 2023-06-18 | Stop reason: HOSPADM

## 2023-06-18 RX ORDER — HYDROXYZINE HYDROCHLORIDE 10 MG/1
10 TABLET, FILM COATED ORAL 3 TIMES DAILY PRN
Status: DISCONTINUED | OUTPATIENT
Start: 2023-06-18 | End: 2023-06-18 | Stop reason: HOSPADM

## 2023-06-18 RX ORDER — NALOXONE HCL 0.4 MG/ML
0.02 VIAL (ML) INJECTION
Status: DISCONTINUED | OUTPATIENT
Start: 2023-06-18 | End: 2023-06-18 | Stop reason: HOSPADM

## 2023-06-18 RX ORDER — VALSARTAN 320 MG/1
320 TABLET ORAL EVERY MORNING
Qty: 90 TABLET | Refills: 3 | Status: SHIPPED | OUTPATIENT
Start: 2023-06-18 | End: 2023-06-21

## 2023-06-18 RX ORDER — SIMETHICONE 80 MG
1 TABLET,CHEWABLE ORAL 4 TIMES DAILY PRN
Status: DISCONTINUED | OUTPATIENT
Start: 2023-06-18 | End: 2023-06-18 | Stop reason: HOSPADM

## 2023-06-18 RX ORDER — SODIUM CHLORIDE 0.9 % (FLUSH) 0.9 %
10 SYRINGE (ML) INJECTION EVERY 12 HOURS PRN
Status: DISCONTINUED | OUTPATIENT
Start: 2023-06-18 | End: 2023-06-18 | Stop reason: HOSPADM

## 2023-06-18 RX ORDER — VALSARTAN 80 MG/1
80 TABLET ORAL DAILY
Qty: 90 TABLET | Refills: 3 | Status: SHIPPED | OUTPATIENT
Start: 2023-06-18 | End: 2023-08-11 | Stop reason: SDUPTHER

## 2023-06-18 RX ORDER — VALSARTAN 320 MG/1
320 TABLET ORAL EVERY MORNING
Qty: 90 TABLET | Refills: 3 | Status: SHIPPED | OUTPATIENT
Start: 2023-06-18 | End: 2023-06-18 | Stop reason: SDUPTHER

## 2023-06-18 RX ADMIN — IOHEXOL 75 ML: 350 INJECTION, SOLUTION INTRAVENOUS at 02:06

## 2023-06-18 RX ADMIN — POTASSIUM CHLORIDE 30 MEQ: 10 CAPSULE, COATED, EXTENDED RELEASE ORAL at 12:06

## 2023-06-18 RX ADMIN — ACETAMINOPHEN 1000 MG: 500 TABLET ORAL at 12:06

## 2023-06-18 RX ADMIN — SODIUM CHLORIDE 1000 ML: 9 INJECTION, SOLUTION INTRAVENOUS at 01:06

## 2023-06-18 RX ADMIN — POTASSIUM CHLORIDE 30 MEQ: 10 CAPSULE, COATED, EXTENDED RELEASE ORAL at 10:06

## 2023-06-18 RX ADMIN — ESCITALOPRAM OXALATE 20 MG: 20 TABLET ORAL at 10:06

## 2023-06-18 NOTE — HOSPITAL COURSE
Pt is 63 yof with pmh anxiety on ssri, HTN presenting with syncope while at a wedding. Pt drinking alcohol at wedding. Does not remember episode, does not know if from standing. No heart history or prodrome. Pt taking BP meds, but was hypotensive on arrival. Pt serum osms were elevated with normal urine osm. Pt also endorsed chest pain with negative trops and EKG, ECHO unremarkable, carotid US will be done outpatient. Pt's orthostatic BP was negative for orthostatic hypotension. Will hold her BP medications and have her follow-up with her PCP to assess her need.

## 2023-06-18 NOTE — ASSESSMENT & PLAN NOTE
Home amlodipine 10 mg, valsartan 320 mg daily.  Pt states BP low for her, typically 120-130s at home.   Complaints of bilateral leg swelling, intermittently since 11/2022.    - hold amlodipine due to complaints of leg swelling  - Will hold BP meds until assessed by PCP  - orthostatics negative

## 2023-06-18 NOTE — ED NOTES
Telemetry Verification   Patient placed on Telemetry Box  Verified with [facility]  Tech Anna   Box # [id number]   Rate ***   Rhythm ***

## 2023-06-18 NOTE — ED NOTES
Telemetry Verification   Patient placed on Telemetry Box  Verified with [facility]  Tech Anna   Box #    Rate 76   Rhythm Normal Sinus

## 2023-06-18 NOTE — ASSESSMENT & PLAN NOTE
Witnessed syncope w/o prodrome while at wedding.   EtOH +, new hyponatremia to 128 wit  Cardiac work-up negative.     - orthostatic negative  - f/u hyponatremia labs  - hold BP meds as low normal for patient while here, re-start as clinically indicated  - fall, aspiration precautions  - stat trop and EKG for chest pain/syncope, both unremarkable  - ECHO unremarkable  - Will undergo carotid US outpatient.

## 2023-06-18 NOTE — ASSESSMENT & PLAN NOTE
Na on admit 128 with urine sodium <10.  Baseline sodium 138.    ?dehydration     - f/u urine osmo, serum osmo  - monitor BMP

## 2023-06-18 NOTE — HPI
63 y.o. F with PMHx of HTN, HLD, anxiety 2/2 to external stressors presenting to the ER after a syncopal episode while she was at a wedding. She cannot recall the episode and did not notice any prodromal symptoms today. She does note of L sided chest pain, sharp, worse with deep inspiration, and worsened jaw and shoulder/neck pain earlier today which prompted her to go get evaluated. She also had had worsening BLE swelling for the past 3 weeks. She had this issue in 11/2022 with US showing no DVTs. She reports compliance with BP meds including Diovan and Norvasc today w/ BP usually running 120/130s. She currently denies fever, chills, CP, SOB, or any other symptoms.    Pt admitted for observation due to high HEART score. Troponin and BNP negative. CXR relatively unchanged. Labs with finding of Na 128 which is new for patient. Urine sodium <10. EtOH levels 217.

## 2023-06-18 NOTE — ASSESSMENT & PLAN NOTE
Home amlodipine 10 mg, valsartan 320 mg daily.  Pt states BP low for her, typically 120-130s at home.   Complaints of bilateral leg swelling, intermittently since 11/2022.    - hold amlodipine due to complaints of leg swelling  - would re-start ARB at lower dose if hypertensive in AM   - f/u orthostatics       decreased baljit

## 2023-06-18 NOTE — ASSESSMENT & PLAN NOTE
Witnessed syncope w/o prodrome while at wedding.   EtOH +, new hyponatremia to 128 wit  Cardiac work-up negative.     - f/u orthostatic   - f/u hyponatremia labs  - hold BP meds as low normal for patient while here, re-start as clinically indicated  - fall, aspiration precautions  - stat trop and EKG for chest pain/syncope

## 2023-06-18 NOTE — ED PROVIDER NOTES
History:  Odilia Winslow is a 63 y.o. female who presents to the ED with Loss of Consciousness (Syncopal episode at wedding, not sure of head injury. Originally wanted to cancel EMS but then remembered she had a chest pain earlier in the day so now would like to be evaluated. )    Described as 62yo F with PMH HTN, HLD, anxiety presenting to the ER after a syncopal episode. She reports for the past 3 weeks she has had worsening BLE swelling. Today, she developed chest pain, sharp, worse with deep inspiration, on the L as well as worsened jaw and shoulder/neck pain. Her pain is mild. Today, she reportedly fell back and hit her head. Patient denies a prodrome and has no recollection of the event. No seizure like activity.     Review of Systems: All systems reviewed and are negative except as per history of present illness.    Medications:   Previous Medications    AMLODIPINE (NORVASC) 10 MG TABLET    Take 1 tablet (10 mg total) by mouth once daily.    ATORVASTATIN (LIPITOR) 80 MG TABLET    Take 1 tablet (80 mg total) by mouth every evening.    CALCIUM ORAL    Take by mouth.    CHOLECALCIFEROL, VITAMIN D3, (VITAMIN D3) 2,000 UNIT CAP    Take 1 capsule (2,000 Units total) by mouth once daily.    CLOBETASOL 0.05% (TEMOVATE) 0.05 % OINT    AAA bid avoid face/groin    DIPHTH,PERTUS,ACELL,,TETANUS (BOOSTRIX TDAP) 2.5-8-5 LF-MCG-LF/0.5ML SYRG INJECTION    Inject into the muscle.    ESCITALOPRAM OXALATE (LEXAPRO) 20 MG TABLET    Take 1 tablet (20 mg total) by mouth once daily.    HYDROXYZINE HCL (ATARAX) 10 MG TAB    Take 1 tablet (10 mg total) by mouth 3 (three) times daily as needed (anxiety).    HYDROXYZINE HCL (ATARAX) 25 MG TABLET    Take 1 tablet (25 mg total) by mouth every evening. If too sedating okay to take 1/2 a pill    MUPIROCIN (BACTROBAN) 2 % OINTMENT    Apply topically 2 (two) times daily. To back and nares    PROGESTERONE (PROMETRIUM) 100 MG CAPSULE    TAKE ONE NIGHTLY    TRAZODONE (DESYREL) 50 MG TABLET     Take 1 tablet (50 mg total) by mouth every evening. prn    TRIAMCINOLONE ACETONIDE 0.1% (KENALOG) 0.1 % OINTMENT    SMARTSI Topical Daily PRN    VALACYCLOVIR (VALTREX) 1000 MG TABLET    TAKE (1/2) HALF BY MOUTH EVERY 12 HOURS PRN    VALSARTAN (DIOVAN) 320 MG TABLET    Take 1 tablet (320 mg total) by mouth every morning.       PMH:   Past Medical History:   Diagnosis Date    Anxiety     Esophageal stricture     GERD (gastroesophageal reflux disease)     H/O cold sores     Hyperlipidemia     Insomnia     Irritable bowel syndrome     Postmenopausal bleeding      PSH:   Past Surgical History:   Procedure Laterality Date    DILATION AND CURETTAGE OF UTERUS  , 2013    ECTOPIC PREGNANCY SURGERY  1986    removal of portion of tube    gerd      KNEE SURGERY Right 2015    arthroscopy    TONSILLECTOMY       Allergies: She has No Known Allergies.  Social History: Marital Status: . She  reports that she has never smoked. She has never used smokeless tobacco.. She  reports current alcohol use..       Exam:  VITAL SIGNS:   Vitals:    23 2329 23 0015 23 0158 23 0425   BP: 118/76 (!) 104/55 (!) 109/58 (!) 117/57   BP Location:  Left arm Left arm Left arm   Patient Position:  Lying Lying Lying   Pulse: 84 82 84 75   Resp: 18 20 20 20   Temp: 98.4 °F (36.9 °C) 98.1 °F (36.7 °C) 98 °F (36.7 °C) 98.2 °F (36.8 °C)   TempSrc: Oral Oral Oral Oral   SpO2: 98% 95% 96% 95%   Weight: 77 kg (169 lb 12.1 oz)        Const: Awake and alert, NAD   Head: Atraumatic  Eyes: Normal Conjunctiva  ENT: Normal External Ears, Nose and Mouth.  Neck: Full range of motion. No meningismus.  No midline tenderness to palpation, no step-offs  Back:  No midline T or L-spine tenderness to palpation, no step-offs  Resp: Normal respiratory effort, No distress, CTAB  Cardio: Equal and intact distal pulses, RRR  Abd: Soft, non tender, non distended.   Skin:  Scabbed lesions, mild excoriations and mild surrounding erythema to  anterior shins bilaterally  Ext: 2+ pitting edema bilateral lower extremities  Neur: Awake and alert moves all extremities equally, cranial nerves grossly intact  Psych: Normal Mood and Affect    Data:  Results for orders placed or performed during the hospital encounter of 06/17/23   CBC auto differential   Result Value Ref Range    WBC 8.31 3.90 - 12.70 K/uL    RBC 3.85 (L) 4.00 - 5.40 M/uL    Hemoglobin 11.9 (L) 12.0 - 16.0 g/dL    Hematocrit 36.4 (L) 37.0 - 48.5 %    MCV 95 82 - 98 fL    MCH 30.9 27.0 - 31.0 pg    MCHC 32.7 32.0 - 36.0 g/dL    RDW 13.0 11.5 - 14.5 %    Platelets 425 150 - 450 K/uL    MPV 8.9 (L) 9.2 - 12.9 fL    Immature Granulocytes 1.8 (H) 0.0 - 0.5 %    Gran # (ANC) 5.5 1.8 - 7.7 K/uL    Immature Grans (Abs) 0.15 (H) 0.00 - 0.04 K/uL    Lymph # 1.6 1.0 - 4.8 K/uL    Mono # 0.8 0.3 - 1.0 K/uL    Eos # 0.3 0.0 - 0.5 K/uL    Baso # 0.07 0.00 - 0.20 K/uL    nRBC 0 0 /100 WBC    Gran % 66.4 38.0 - 73.0 %    Lymph % 18.8 18.0 - 48.0 %    Mono % 9.0 4.0 - 15.0 %    Eosinophil % 3.2 0.0 - 8.0 %    Basophil % 0.8 0.0 - 1.9 %    Differential Method Automated    Comprehensive metabolic panel   Result Value Ref Range    Sodium 128 (L) 136 - 145 mmol/L    Potassium 3.3 (L) 3.5 - 5.1 mmol/L    Chloride 99 95 - 110 mmol/L    CO2 17 (L) 23 - 29 mmol/L    Glucose 110 70 - 110 mg/dL    BUN 10 8 - 23 mg/dL    Creatinine 0.7 0.5 - 1.4 mg/dL    Calcium 8.9 8.7 - 10.5 mg/dL    Total Protein 6.6 6.0 - 8.4 g/dL    Albumin 3.3 (L) 3.5 - 5.2 g/dL    Total Bilirubin 0.2 0.1 - 1.0 mg/dL    Alkaline Phosphatase 69 55 - 135 U/L    AST 25 10 - 40 U/L    ALT 16 10 - 44 U/L    Anion Gap 12 8 - 16 mmol/L    eGFR >60.0 >60 mL/min/1.73 m^2   Troponin I #1   Result Value Ref Range    Troponin I <0.006 0.000 - 0.026 ng/mL   B-Type natriuretic peptide (BNP)   Result Value Ref Range    BNP 15 0 - 99 pg/mL   Ethanol   Result Value Ref Range    Alcohol, Serum 217 (H) <10 mg/dL   Drug screen panel, emergency   Result Value Ref Range     Benzodiazepines Negative Negative    Methadone metabolites Negative Negative    Cocaine (Metab.) Negative Negative    Opiate Scrn, Ur Negative Negative    Barbiturate Screen, Ur Negative Negative    Amphetamine Screen, Ur Negative Negative    THC Negative Negative    Phencyclidine Negative Negative    Creatinine, Urine 14.0 (L) 15.0 - 325.0 mg/dL    Toxicology Information SEE COMMENT    Troponin ISTAT   Result Value Ref Range    POC Cardiac Troponin I 0.00 0.00 - 0.08 ng/mL    Sample unknown    Sodium, urine, random   Result Value Ref Range    Sodium, Urine <10 (L) 20 - 250 mmol/L   Troponin I #2   Result Value Ref Range    Troponin I <0.006 0.000 - 0.026 ng/mL   Troponin ISTAT   Result Value Ref Range    POC Cardiac Troponin I 0.00 0.00 - 0.08 ng/mL    Sample unknown      Imaging Results              CTA Chest Non-Coronary (PE Studies) (Final result)  Result time 06/18/23 03:58:02      Final result by Georges Jarrell MD (06/18/23 03:58:02)                   Impression:      No definite evidence of pulmonary thromboembolism as clinically questioned.    No acute intrathoracic abnormality identified.    Additional findings as above.    Electronically signed by resident: Gabriela Min  Date:    06/18/2023  Time:    03:15    Electronically signed by: Georges Jarrell MD  Date:    06/18/2023  Time:    03:58               Narrative:    EXAMINATION:  CTA CHEST NON CORONARY (PE STUDIES)    CLINICAL HISTORY:  Pulmonary embolism (PE) suspected, unknown D-dimer;    TECHNIQUE:  Low dose axial images, sagittal and coronal reformations were obtained from the thoracic inlet to the lung bases following the IV administration of 75 mL of Omnipaque 350.  MIP images were performed.    COMPARISON:  Chest radiograph 06/18/2023    FINDINGS:  Base of Neck: No significant abnormality.    Thoracic soft tissues: Unremarkable.  No axillary lymphadenopathy.    Aorta/vasculature: 3 vessel left-sided arch with mild calcific  atherosclerosis.  No aneurysm.    Heart: Normal size. No effusion.    Pulmonary vasculature: This study is satisfactory for the evaluation of the pulmonary arteries. Apparent filling defect in the posterior segmental artery of the right lung which is favored artifactual (series 2, image 161) from adjacent contrast bolus in the SVC.  No definite evidence of pulmonary thromboembolism.  Pulmonary veins are unremarkable.    Anitra/Mediastinum: No pathologic natalya enlargement.    Airways: Patent.    Lungs/Pleura: Mild biapical fibronodular scarring.  Dependent atelectasis.  No consolidation, pleural effusion, or pneumothorax.    Esophagus: Normal.    Upper Abdomen: Bilateral extrarenal pelvises, right larger than left.  Small to moderate hiatal hernia.    Bones: No acute fracture. No suspicious lytic or sclerotic lesions.                                       CT Cervical Spine Without Contrast (Final result)  Result time 06/18/23 03:36:37      Final result by Georges Jarrell MD (06/18/23 03:36:37)                   Impression:      No fracture or traumatic malalignment of the cervical spine.    Electronically signed by resident: Gabriela Min  Date:    06/18/2023  Time:    03:06    Electronically signed by: Georges Jarrell MD  Date:    06/18/2023  Time:    03:36               Narrative:    EXAMINATION:  CT CERVICAL SPINE WITHOUT CONTRAST    CLINICAL HISTORY:  Neck trauma (Age >= 65y);    TECHNIQUE:  Low dose axial CT images through the cervical spine, with sagittal and coronal reformations.  Contrast was not administered.    COMPARISON:  None    FINDINGS:  The predental space is maintained.  Minimal fat a dental degenerative changes.    The vertebral bodies are normal in height and morphology without evidence of fracture or osseous destructive process.  Minimal anterolisthesis of C6 on C7..    Multilevel degenerative changes of the cervical spine mostly involving bilateral facet arthropathy.  No high-grade spinal  canal stenosis or neural foraminal narrowing.    Limited evaluation of the intraspinal contents demonstrates no hematoma or mass.Paraspinal soft tissues exhibit no acute abnormalities.  No acute abnormality within the imaged portions of the lungs.  Mild dependent atelectasis.                                       CT Head Without Contrast (Final result)  Result time 06/18/23 02:55:19      Final result by Georges Jarrell MD (06/18/23 02:55:19)                   Impression:      No acute intracranial abnormality.    Additional findings as above.    Electronically signed by resident: Gabriela Min  Date:    06/18/2023  Time:    02:37    Electronically signed by: Georges Jarrell MD  Date:    06/18/2023  Time:    02:55               Narrative:    EXAMINATION:  CT HEAD WITHOUT CONTRAST    CLINICAL HISTORY:  Facial trauma, blunt;    TECHNIQUE:  Low dose axial CT images obtained throughout the head without intravenous contrast. Sagittal and coronal reconstructions were performed.    COMPARISON:  None.    FINDINGS:  Intracranial compartment:    Prominence of the ventricles and sulci compatible with mild generalized cerebral volume loss.  No hydrocephalus. No extra-axial blood or fluid collections.    No parenchymal mass, hemorrhage, edema or major vascular distribution infarct.    Ruddy cisterna magna versus arachnoid cyst in the posterior fossa.    Skull/extracranial contents (limited evaluation):    No fracture. Are maxillary membrane thickening.  Mastoid air cells are essentially clear.                                       X-Ray Chest AP Portable (Final result)  Result time 06/18/23 00:26:32      Final result by Ajay Ndiaye DO (06/18/23 00:26:32)                   Impression:      Bibasilar atelectasis or consolidations.      Electronically signed by: Ajay Ndiaye  Date:    06/18/2023  Time:    00:26               Narrative:    EXAMINATION:  XR CHEST AP PORTABLE    CLINICAL HISTORY:  Chest pain,  unspecified    TECHNIQUE:  Single frontal view of the chest was performed.    COMPARISON:  2017.    FINDINGS:  The lungs are well expanded there are bibasilar opacities compatible with atelectasis or consolidations, left greater than right.  The pleural spaces are clear.    The cardiac silhouette is unremarkable.    The visualized osseous structures are unremarkable.                                    12-LEAD EKG INTERPRETATION BY ME:  Rate/Rhythm: Normal Sinus Rhythm with rate of 84 beats per minute  QRS, ST, T-waves: No changes consistent with acute ischemia  Impression:  No evidence of ischemia or arrhythmia     Labs & Imaging studies were reviewed independently by me.     Medical Decision Makin-year-old female presenting to the emergency department after a syncopal episode after having chest pain throughout the day.  Cardiac workup was initiated concern for possible ACS given moderate hurts score.  Troponin negative x1 will be trended.  Chest x-ray shows likely bibasilar atelectasis, doubt pneumonia given no symptoms.  Alcohol level was elevated, though I do not believe this is contributing to her syncope as she was not clinically intoxicated on examination.  She was a minimal anemia, hemoglobin 11.9, no signs of active bleeding.  She was mildly hyponatremic, likely secondary to dehydration versus alcohol abuse.  CT head and neck negative for acute traumatic injury.  CTA negative for PE.  Plan for admission for further observation and ACS rule out to Hospital Medicine.    Clinical Impression:  1. Loss of consciousness    2. Chest pain                   Luann Ambrocio MD  [phone]

## 2023-06-18 NOTE — SUBJECTIVE & OBJECTIVE
Past Medical History:   Diagnosis Date    Anxiety     Esophageal stricture     GERD (gastroesophageal reflux disease)     H/O cold sores     Hyperlipidemia     Insomnia     Irritable bowel syndrome     Postmenopausal bleeding        Past Surgical History:   Procedure Laterality Date    DILATION AND CURETTAGE OF UTERUS  2007, 2013    ECTOPIC PREGNANCY SURGERY  1986    removal of portion of tube    gerd      KNEE SURGERY Right 2015    arthroscopy    TONSILLECTOMY         Review of patient's allergies indicates:  No Known Allergies    No current facility-administered medications on file prior to encounter.     Current Outpatient Medications on File Prior to Encounter   Medication Sig    amLODIPine (NORVASC) 10 MG tablet Take 1 tablet (10 mg total) by mouth once daily.    atorvastatin (LIPITOR) 80 MG tablet Take 1 tablet (80 mg total) by mouth every evening.    CALCIUM ORAL Take by mouth.    cholecalciferol, vitamin D3, (VITAMIN D3) 2,000 unit Cap Take 1 capsule (2,000 Units total) by mouth once daily.    clobetasol 0.05% (TEMOVATE) 0.05 % Oint AAA bid avoid face/groin    diphth,pertus,acell,,tetanus (BOOSTRIX TDAP) 2.5-8-5 Lf-mcg-Lf/0.5mL Syrg injection Inject into the muscle.    EScitalopram oxalate (LEXAPRO) 20 MG tablet Take 1 tablet (20 mg total) by mouth once daily.    hydrOXYzine HCL (ATARAX) 10 MG Tab Take 1 tablet (10 mg total) by mouth 3 (three) times daily as needed (anxiety).    hydrOXYzine HCL (ATARAX) 25 MG tablet Take 1 tablet (25 mg total) by mouth every evening. If too sedating okay to take 1/2 a pill    mupirocin (BACTROBAN) 2 % ointment Apply topically 2 (two) times daily. To back and nares    progesterone (PROMETRIUM) 100 MG capsule TAKE ONE NIGHTLY    traZODone (DESYREL) 50 MG tablet Take 1 tablet (50 mg total) by mouth every evening. prn    triamcinolone acetonide 0.1% (KENALOG) 0.1 % ointment SMARTSI Topical Daily PRN    valACYclovir (VALTREX) 1000 MG tablet TAKE (1/2) HALF BY MOUTH EVERY 12  HOURS PRN    valsartan (DIOVAN) 320 MG tablet Take 1 tablet (320 mg total) by mouth every morning.     Family History       Problem Relation (Age of Onset)    Bipolar disorder Son    Breast cancer Maternal Aunt    Cancer Mother, Father, Brother    Diabetes Father    Heart disease Mother    Hypertension Mother    Mental illness Mother, Son, Sister    No Known Problems Sister, Sister, Brother, Son    Thyroid disease Daughter          Tobacco Use    Smoking status: Never    Smokeless tobacco: Never   Substance and Sexual Activity    Alcohol use: Yes     Comment: occasional    Drug use: No    Sexual activity: Yes     Partners: Male     Birth control/protection: Post-menopausal     Comment:  to Dylan     Review of Systems   Constitutional:  Negative for chills and fever.   HENT:  Negative for sore throat and trouble swallowing.    Eyes:  Negative for pain and visual disturbance.   Respiratory:  Negative for cough and shortness of breath.    Cardiovascular:  Positive for chest pain and leg swelling. Negative for palpitations.   Gastrointestinal:  Negative for abdominal pain, diarrhea, nausea and vomiting.   Genitourinary:  Negative for difficulty urinating and dysuria.   Musculoskeletal:  Negative for joint swelling and myalgias.   Skin:  Negative for rash and wound.   Neurological:  Positive for syncope. Negative for weakness, light-headedness and headaches.   Objective:     Vital Signs (Most Recent):  Temp: 98.2 °F (36.8 °C) (06/18/23 0425)  Pulse: 75 (06/18/23 0425)  Resp: 20 (06/18/23 0425)  BP: (!) 117/57 (06/18/23 0425)  SpO2: 95 % (06/18/23 0425) Vital Signs (24h Range):  Temp:  [98 °F (36.7 °C)-98.4 °F (36.9 °C)] 98.2 °F (36.8 °C)  Pulse:  [75-84] 75  Resp:  [18-20] 20  SpO2:  [95 %-98 %] 95 %  BP: (104-118)/(55-76) 117/57     Weight: 77 kg (169 lb 12.8 oz)  Body mass index is 26.59 kg/m².     Physical Exam  Constitutional:       General: She is not in acute distress.     Appearance: Normal appearance. She  is normal weight. She is not ill-appearing, toxic-appearing or diaphoretic.   HENT:      Head: Normocephalic and atraumatic.      Nose: Nose normal. No congestion or rhinorrhea.   Eyes:      General: No scleral icterus.        Right eye: No discharge.         Left eye: No discharge.      Extraocular Movements: Extraocular movements intact.      Conjunctiva/sclera: Conjunctivae normal.   Cardiovascular:      Rate and Rhythm: Normal rate and regular rhythm.      Pulses: Normal pulses.   Abdominal:      General: Abdomen is flat. There is no distension.      Palpations: Abdomen is soft.      Tenderness: There is no abdominal tenderness.   Musculoskeletal:         General: Normal range of motion.      Cervical back: Neck supple. No rigidity.   Skin:     General: Skin is warm and dry.      Findings: No bruising or rash.   Neurological:      General: No focal deficit present.      Mental Status: She is alert and oriented to person, place, and time.   Psychiatric:         Mood and Affect: Mood normal.         Behavior: Behavior normal.              Significant Labs: All pertinent labs within the past 24 hours have been reviewed.  CBC:   Recent Labs   Lab 06/17/23 2357   WBC 8.31   HGB 11.9*   HCT 36.4*        CMP:   Recent Labs   Lab 06/17/23 2357   *   K 3.3*   CL 99   CO2 17*      BUN 10   CREATININE 0.7   CALCIUM 8.9   PROT 6.6   ALBUMIN 3.3*   BILITOT 0.2   ALKPHOS 69   AST 25   ALT 16   ANIONGAP 12     Cardiac Markers:   Recent Labs   Lab 06/17/23 2357   BNP 15     Troponin:   Recent Labs   Lab 06/17/23 2357 06/18/23  0304   TROPONINI <0.006 <0.006       Significant Imaging: I have reviewed all pertinent imaging results/findings within the past 24 hours.

## 2023-06-18 NOTE — ASSESSMENT & PLAN NOTE
Na on admit 128 with urine sodium <10.  Baseline sodium 138.    ?dehydration     - f/u urine osmo, serum osmo, thinking most likely a dehydration component  - monitor BMP

## 2023-06-18 NOTE — ED NOTES
Odilia Winslow, a 63 y.o. female presents to the ED w/ complaint of LOC. Syncopal episode at wedding. Not sure of head injury. Wanted to cancel EMS but remembered  she had Chest pain earlier in the day and bilateral swelling in legs    Triage note:  Chief Complaint   Patient presents with    Loss of Consciousness     Syncopal episode at wedding, not sure of head injury. Originally wanted to cancel EMS but then remembered she had a chest pain earlier in the day so now would like to be evaluated.      Review of patient's allergies indicates:  No Known Allergies  Past Medical History:   Diagnosis Date    Anxiety     Esophageal stricture     GERD (gastroesophageal reflux disease)     H/O cold sores     Hyperlipidemia     Insomnia     Irritable bowel syndrome     Postmenopausal bleeding

## 2023-06-18 NOTE — H&P
[facility] Medicine  History & Physical    Patient Name: Odilia Winslow  MRN: [id number]  Patient Class: OP- Observation  Admission Date: 6/17/2023  Attending Physician: Burak Zuleta MD   Primary Care Provider: Meredith Izaguirre MD         Patient information was obtained from patient, past medical records and ER records.     Subjective:     Principal Problem:Syncope and collapse    Chief Complaint:   Chief Complaint   Patient presents with    Loss of Consciousness     Syncopal episode at wedding, not sure of head injury. Originally wanted to cancel EMS but then remembered she had a chest pain earlier in the day so now would like to be evaluated.         HPI: 63 y.o. F with PMHx of HTN, HLD, anxiety 2/2 to external stressors presenting to the ER after a syncopal episode while she was at a wedding. She cannot recall the episode and did not notice any prodromal symptoms today. She does note of L sided chest pain, sharp, worse with deep inspiration, and worsened jaw and shoulder/neck pain earlier today which prompted her to go get evaluated. She also had had worsening BLE swelling for the past 3 weeks. She had this issue in 11/2022 with US showing no DVTs. She reports compliance with BP meds including Diovan and Norvasc today w/ BP usually running 120/130s. She currently denies fever, chills, CP, SOB, or any other symptoms.    Pt admitted for observation due to high HEART score. Troponin and BNP negative. CXR relatively unchanged. Labs with finding of Na 128 which is new for patient. Urine sodium <10. EtOH levels 217.       Past Medical History:   Diagnosis Date    Anxiety     Esophageal stricture     GERD (gastroesophageal reflux disease)     H/O cold sores     Hyperlipidemia     Insomnia     Irritable bowel syndrome     Postmenopausal bleeding        Past Surgical History:   Procedure Laterality Date    DILATION AND CURETTAGE OF UTERUS  2007, 2013    ECTOPIC PREGNANCY SURGERY   1986    removal of portion of tube    gerd      KNEE SURGERY Right 2015    arthroscopy    TONSILLECTOMY         Review of patient's allergies indicates:  No Known Allergies    No current facility-administered medications on file prior to encounter.     Current Outpatient Medications on File Prior to Encounter   Medication Sig    amLODIPine (NORVASC) 10 MG tablet Take 1 tablet (10 mg total) by mouth once daily.    atorvastatin (LIPITOR) 80 MG tablet Take 1 tablet (80 mg total) by mouth every evening.    CALCIUM ORAL Take by mouth.    cholecalciferol, vitamin D3, (VITAMIN D3) 2,000 unit Cap Take 1 capsule (2,000 Units total) by mouth once daily.    clobetasol 0.05% (TEMOVATE) 0.05 % Oint AAA bid avoid face/groin    diphth,pertus,acell,,tetanus (BOOSTRIX TDAP) 2.5-8-5 Lf-mcg-Lf/0.5mL Syrg injection Inject into the muscle.    EScitalopram oxalate (LEXAPRO) 20 MG tablet Take 1 tablet (20 mg total) by mouth once daily.    hydrOXYzine HCL (ATARAX) 10 MG Tab Take 1 tablet (10 mg total) by mouth 3 (three) times daily as needed (anxiety).    hydrOXYzine HCL (ATARAX) 25 MG tablet Take 1 tablet (25 mg total) by mouth every evening. If too sedating okay to take 1/2 a pill    mupirocin (BACTROBAN) 2 % ointment Apply topically 2 (two) times daily. To back and nares    progesterone (PROMETRIUM) 100 MG capsule TAKE ONE NIGHTLY    traZODone (DESYREL) 50 MG tablet Take 1 tablet (50 mg total) by mouth every evening. prn    triamcinolone acetonide 0.1% (KENALOG) 0.1 % ointment SMARTSI Topical Daily PRN    valACYclovir (VALTREX) 1000 MG tablet TAKE (1/2) HALF BY MOUTH EVERY 12 HOURS PRN    valsartan (DIOVAN) 320 MG tablet Take 1 tablet (320 mg total) by mouth every morning.     Family History       Problem Relation (Age of Onset)    Bipolar disorder Son    Breast cancer Maternal Aunt    Cancer Mother, Father, Brother    Diabetes Father    Heart disease Mother    Hypertension Mother    Mental illness Mother, Son,  Sister    No Known Problems Sister, Sister, Brother, Son    Thyroid disease Daughter          Tobacco Use    Smoking status: Never    Smokeless tobacco: Never   Substance and Sexual Activity    Alcohol use: Yes     Comment: occasional    Drug use: No    Sexual activity: Yes     Partners: Male     Birth control/protection: Post-menopausal     Comment:  to Dylan     Review of Systems   Constitutional:  Negative for chills and fever.   HENT:  Negative for sore throat and trouble swallowing.    Eyes:  Negative for pain and visual disturbance.   Respiratory:  Negative for cough and shortness of breath.    Cardiovascular:  Positive for chest pain and leg swelling. Negative for palpitations.   Gastrointestinal:  Negative for abdominal pain, diarrhea, nausea and vomiting.   Genitourinary:  Negative for difficulty urinating and dysuria.   Musculoskeletal:  Negative for joint swelling and myalgias.   Skin:  Negative for rash and wound.   Neurological:  Positive for syncope. Negative for weakness, light-headedness and headaches.   Objective:     Vital Signs (Most Recent):  Temp: 98.2 °F (36.8 °C) (06/18/23 0425)  Pulse: 75 (06/18/23 0425)  Resp: 20 (06/18/23 0425)  BP: (!) 117/57 (06/18/23 0425)  SpO2: 95 % (06/18/23 0425) Vital Signs (24h Range):  Temp:  [98 °F (36.7 °C)-98.4 °F (36.9 °C)] 98.2 °F (36.8 °C)  Pulse:  [75-84] 75  Resp:  [18-20] 20  SpO2:  [95 %-98 %] 95 %  BP: (104-118)/(55-76) 117/57     Weight: 77 kg (169 lb 12.8 oz)  Body mass index is 26.59 kg/m².     Physical Exam  Constitutional:       General: She is not in acute distress.     Appearance: Normal appearance. She is normal weight. She is not ill-appearing, toxic-appearing or diaphoretic.   HENT:      Head: Normocephalic and atraumatic.      Nose: Nose normal. No congestion or rhinorrhea.   Eyes:      General: No scleral icterus.        Right eye: No discharge.         Left eye: No discharge.      Extraocular Movements: Extraocular movements  intact.      Conjunctiva/sclera: Conjunctivae normal.   Cardiovascular:      Rate and Rhythm: Normal rate and regular rhythm.      Pulses: Normal pulses.   Abdominal:      General: Abdomen is flat. There is no distension.      Palpations: Abdomen is soft.      Tenderness: There is no abdominal tenderness.   Musculoskeletal:         General: Normal range of motion.      Cervical back: Neck supple. No rigidity.   Skin:     General: Skin is warm and dry.      Findings: No bruising or rash.   Neurological:      General: No focal deficit present.      Mental Status: She is alert and oriented to person, place, and time.   Psychiatric:         Mood and Affect: Mood normal.         Behavior: Behavior normal.              Significant Labs: All pertinent labs within the past 24 hours have been reviewed.  CBC:   Recent Labs   Lab 06/17/23 2357   WBC 8.31   HGB 11.9*   HCT 36.4*        CMP:   Recent Labs   Lab 06/17/23 2357   *   K 3.3*   CL 99   CO2 17*      BUN 10   CREATININE 0.7   CALCIUM 8.9   PROT 6.6   ALBUMIN 3.3*   BILITOT 0.2   ALKPHOS 69   AST 25   ALT 16   ANIONGAP 12     Cardiac Markers:   Recent Labs   Lab 06/17/23 2357   BNP 15     Troponin:   Recent Labs   Lab 06/17/23 2357 06/18/23  0304   TROPONINI <0.006 <0.006       Significant Imaging: I have reviewed all pertinent imaging results/findings within the past 24 hours.    Assessment/Plan:     * Syncope and collapse  Witnessed syncope w/o prodrome while at wedding.   EtOH +, new hyponatremia to 128 wit  Cardiac work-up negative.     - f/u orthostatic   - f/u hyponatremia labs  - hold BP meds as low normal for patient while here, re-start as clinically indicated  - fall, aspiration precautions  - stat trop and EKG for chest pain/syncope    Hyponatremia  Na on admit 128 with urine sodium <10.  Baseline sodium 138.    ?dehydration     - f/u urine osmo, serum osmo  - monitor BMP        Dyslipidemia  - continue HI statin      HTN  (hypertension), benign  Home amlodipine 10 mg, valsartan 320 mg daily.  Pt states BP low for her, typically 120-130s at home.   Complaints of bilateral leg swelling, intermittently since 11/2022.    - hold amlodipine due to complaints of leg swelling  - would re-start ARB at lower dose if hypertensive in AM   - f/u orthostatics          VTE Risk Mitigation (From admission, onward)         Ordered     IP VTE LOW RISK PATIENT  Once         06/18/23 0419     Place sequential compression device  Until discontinued         06/18/23 0419                     Liliya Palmer MD  Department of Hospital Medicine  [facility]

## 2023-06-19 ENCOUNTER — PATIENT MESSAGE (OUTPATIENT)
Dept: INTERNAL MEDICINE | Facility: CLINIC | Age: 63
End: 2023-06-19
Payer: MEDICARE

## 2023-06-21 ENCOUNTER — OFFICE VISIT (OUTPATIENT)
Dept: INTERNAL MEDICINE | Facility: CLINIC | Age: 63
End: 2023-06-21
Payer: MEDICARE

## 2023-06-21 VITALS
DIASTOLIC BLOOD PRESSURE: 72 MMHG | BODY MASS INDEX: 26.17 KG/M2 | HEART RATE: 83 BPM | WEIGHT: 167.13 LBS | SYSTOLIC BLOOD PRESSURE: 149 MMHG

## 2023-06-21 DIAGNOSIS — E87.1 HYPONATREMIA: ICD-10-CM

## 2023-06-21 DIAGNOSIS — R55 SYNCOPE, UNSPECIFIED SYNCOPE TYPE: ICD-10-CM

## 2023-06-21 DIAGNOSIS — D53.9 NUTRITIONAL ANEMIA: ICD-10-CM

## 2023-06-21 DIAGNOSIS — E87.6 HYPOKALEMIA: ICD-10-CM

## 2023-06-21 DIAGNOSIS — D64.9 ANEMIA, UNSPECIFIED TYPE: Primary | ICD-10-CM

## 2023-06-21 PROCEDURE — 4010F ACE/ARB THERAPY RXD/TAKEN: CPT | Mod: CPTII,S$GLB,, | Performed by: STUDENT IN AN ORGANIZED HEALTH CARE EDUCATION/TRAINING PROGRAM

## 2023-06-21 PROCEDURE — 3078F PR MOST RECENT DIASTOLIC BLOOD PRESSURE < 80 MM HG: ICD-10-PCS | Mod: CPTII,S$GLB,, | Performed by: STUDENT IN AN ORGANIZED HEALTH CARE EDUCATION/TRAINING PROGRAM

## 2023-06-21 PROCEDURE — 3008F BODY MASS INDEX DOCD: CPT | Mod: CPTII,S$GLB,, | Performed by: STUDENT IN AN ORGANIZED HEALTH CARE EDUCATION/TRAINING PROGRAM

## 2023-06-21 PROCEDURE — 1159F MED LIST DOCD IN RCRD: CPT | Mod: CPTII,S$GLB,, | Performed by: STUDENT IN AN ORGANIZED HEALTH CARE EDUCATION/TRAINING PROGRAM

## 2023-06-21 PROCEDURE — 99999 PR PBB SHADOW E&M-EST. PATIENT-LVL IV: CPT | Mod: PBBFAC,,, | Performed by: STUDENT IN AN ORGANIZED HEALTH CARE EDUCATION/TRAINING PROGRAM

## 2023-06-21 PROCEDURE — 4010F PR ACE/ARB THEARPY RXD/TAKEN: ICD-10-PCS | Mod: CPTII,S$GLB,, | Performed by: STUDENT IN AN ORGANIZED HEALTH CARE EDUCATION/TRAINING PROGRAM

## 2023-06-21 PROCEDURE — 1101F PT FALLS ASSESS-DOCD LE1/YR: CPT | Mod: CPTII,S$GLB,, | Performed by: STUDENT IN AN ORGANIZED HEALTH CARE EDUCATION/TRAINING PROGRAM

## 2023-06-21 PROCEDURE — 99214 OFFICE O/P EST MOD 30 MIN: CPT | Mod: S$GLB,,, | Performed by: STUDENT IN AN ORGANIZED HEALTH CARE EDUCATION/TRAINING PROGRAM

## 2023-06-21 PROCEDURE — 3077F SYST BP >= 140 MM HG: CPT | Mod: CPTII,S$GLB,, | Performed by: STUDENT IN AN ORGANIZED HEALTH CARE EDUCATION/TRAINING PROGRAM

## 2023-06-21 PROCEDURE — 99214 PR OFFICE/OUTPT VISIT, EST, LEVL IV, 30-39 MIN: ICD-10-PCS | Mod: S$GLB,,, | Performed by: STUDENT IN AN ORGANIZED HEALTH CARE EDUCATION/TRAINING PROGRAM

## 2023-06-21 PROCEDURE — 3077F PR MOST RECENT SYSTOLIC BLOOD PRESSURE >= 140 MM HG: ICD-10-PCS | Mod: CPTII,S$GLB,, | Performed by: STUDENT IN AN ORGANIZED HEALTH CARE EDUCATION/TRAINING PROGRAM

## 2023-06-21 PROCEDURE — 3078F DIAST BP <80 MM HG: CPT | Mod: CPTII,S$GLB,, | Performed by: STUDENT IN AN ORGANIZED HEALTH CARE EDUCATION/TRAINING PROGRAM

## 2023-06-21 PROCEDURE — 3288F FALL RISK ASSESSMENT DOCD: CPT | Mod: CPTII,S$GLB,, | Performed by: STUDENT IN AN ORGANIZED HEALTH CARE EDUCATION/TRAINING PROGRAM

## 2023-06-21 PROCEDURE — 1101F PR PT FALLS ASSESS DOC 0-1 FALLS W/OUT INJ PAST YR: ICD-10-PCS | Mod: CPTII,S$GLB,, | Performed by: STUDENT IN AN ORGANIZED HEALTH CARE EDUCATION/TRAINING PROGRAM

## 2023-06-21 PROCEDURE — 3288F PR FALLS RISK ASSESSMENT DOCUMENTED: ICD-10-PCS | Mod: CPTII,S$GLB,, | Performed by: STUDENT IN AN ORGANIZED HEALTH CARE EDUCATION/TRAINING PROGRAM

## 2023-06-21 PROCEDURE — 99999 PR PBB SHADOW E&M-EST. PATIENT-LVL IV: ICD-10-PCS | Mod: PBBFAC,,, | Performed by: STUDENT IN AN ORGANIZED HEALTH CARE EDUCATION/TRAINING PROGRAM

## 2023-06-21 PROCEDURE — 1159F PR MEDICATION LIST DOCUMENTED IN MEDICAL RECORD: ICD-10-PCS | Mod: CPTII,S$GLB,, | Performed by: STUDENT IN AN ORGANIZED HEALTH CARE EDUCATION/TRAINING PROGRAM

## 2023-06-21 PROCEDURE — 3008F PR BODY MASS INDEX (BMI) DOCUMENTED: ICD-10-PCS | Mod: CPTII,S$GLB,, | Performed by: STUDENT IN AN ORGANIZED HEALTH CARE EDUCATION/TRAINING PROGRAM

## 2023-06-21 NOTE — PROGRESS NOTES
Subjective:       Patient ID: Odilia Winslow is a 63 y.o. female.    Chief Complaint: Anemia, unspecified type [D64.9]    Patient is known to me, PCP is Dr. Izaguirre. Here today for the following:    Had syncopal episode at a wedding on Saturday  Unsure duration of syncope, no seizure activity  Experienced chest discomfort prior to syncope  Felt like it was related to anxiety   Had swelling BLE for a few weeks before this occurred  Endorses A/C was out at the wedding   Had to carry gifts up the stairs  Not drinking a whole lot of water recently    CTA, CT cervical, and CT head without acute abnormalities  CXR with bibasilar atelectasis vs consildations  Hyponatremic, hypokalemic, and mildly anemic  Urine osm normal, urine sodium low  Serum osm high, had just received IV contrast   Orthostatics negative in ED   Given 1 liter IVF per patient   Hyponatremia and hypokalemia resolved on repeat  EKG without acute changes, Echo ok with EF 65%  Has carotid u/s ordered for outpatient, has not yet scheduled     Endorses recent night sweats for the past 1-2 weeks     Discontinued the amlodipine after ED visit  Lowered valsartan 80 mg after ED visit  Patient endorses home 's and DBP 70-80's.  Due for micro albumin creatinine ratio.   BP Readings from Last 5 Encounters:  06/18/23 : (!) 149/68  02/01/23 : 120/60  11/10/22 : 115/64  09/01/22 : 134/60  10/03/21 : 127/69    Lab Results       Component                Value               Date                       HGB                      11.9 (L)            06/17/2023                 HCT                      36.4 (L)            06/17/2023                 MCV                      95                  06/17/2023                 RDW                      13.0                06/17/2023                  Review of Systems   All other systems reviewed and are negative.        Current Outpatient Medications   Medication Instructions    amLODIPine (NORVASC) 10 mg, Oral, Daily, DO NOT  TAKE UNTIL YOU FOLLOW UP WITH YOUR PRIMARY CARE DOCTOR    atorvastatin (LIPITOR) 80 mg, Oral, Nightly    CALCIUM ORAL Oral    cholecalciferol (vitamin D3) (VITAMIN D3) 2,000 Units, Oral, Daily    clobetasol 0.05% (TEMOVATE) 0.05 % Oint AAA bid avoid face/groin    diphth,pertus,acell,,tetanus (BOOSTRIX TDAP) 2.5-8-5 Lf-mcg-Lf/0.5mL Syrg injection Intramuscular    EScitalopram oxalate (LEXAPRO) 20 mg, Oral, Daily    hydrOXYzine HCL (ATARAX) 10 mg, Oral, 3 times daily PRN    mupirocin (BACTROBAN) 2 % ointment Topical (Top), 2 times daily, To back and nares    progesterone (PROMETRIUM) 100 MG capsule TAKE ONE NIGHTLY    traZODone (DESYREL) 50 mg, Oral, Nightly, prn    triamcinolone acetonide 0.1% (KENALOG) 0.1 % ointment SMARTSI Topical Daily PRN    valACYclovir (VALTREX) 1000 MG tablet TAKE (1/2) HALF BY MOUTH EVERY 12 HOURS PRN    valsartan (DIOVAN) 320 mg, Oral, Every morning, DO NOT TAKE UNTIL YOU SEE YOUR PRIMARY CARE DOCTOR, TAKE THE 80mg UNLESS BP STARTS BEING IN THE 160s on the TOP OR ABOVE 90 ON THE BOTTOM THEN YOU CAN SWITCH BACK TO HIGHER DOSE    valsartan (DIOVAN) 80 mg, Oral, Daily, TAKE UNTIL YOU SEE PCP OR YOU MEET CRITERIA MENTIONED ON VALSARTAN 320mg     Objective:      Vitals:    23 0856 23 0952 23 0953 23 0954   BP: 138/80 (!) 160/70 (!) 146/74 (!) 149/72   Pulse: 72 78 77 83   Weight: 75.8 kg (167 lb 1.7 oz)      PainSc: 0-No pain        Body mass index is 26.17 kg/m².    Physical Exam  Vitals reviewed.   Constitutional:       General: She is not in acute distress.     Appearance: Normal appearance. She is not ill-appearing or diaphoretic.   HENT:      Head: Normocephalic and atraumatic.      Right Ear: Tympanic membrane, ear canal and external ear normal. There is no impacted cerumen.      Left Ear: Tympanic membrane, ear canal and external ear normal. There is no impacted cerumen.      Nose: Nose normal. No rhinorrhea.      Mouth/Throat:      Mouth: Mucous membranes are  moist.      Pharynx: Oropharynx is clear. No oropharyngeal exudate or posterior oropharyngeal erythema.   Eyes:      General: No scleral icterus.        Right eye: No discharge.         Left eye: No discharge.      Conjunctiva/sclera: Conjunctivae normal.   Neck:      Thyroid: No thyromegaly or thyroid tenderness.      Trachea: Trachea normal.   Cardiovascular:      Rate and Rhythm: Normal rate and regular rhythm.      Heart sounds: Normal heart sounds. No murmur heard.     No friction rub. No gallop.   Pulmonary:      Effort: Pulmonary effort is normal. No respiratory distress.      Breath sounds: Normal breath sounds. No stridor. No wheezing, rhonchi or rales.   Musculoskeletal:      Cervical back: Neck supple.   Lymphadenopathy:      Head:      Right side of head: No submandibular or posterior auricular adenopathy.      Left side of head: No submandibular or posterior auricular adenopathy.      Cervical: No cervical adenopathy.      Right cervical: No superficial, deep or posterior cervical adenopathy.     Left cervical: No superficial, deep or posterior cervical adenopathy.      Upper Body:      Right upper body: No supraclavicular adenopathy.      Left upper body: No supraclavicular adenopathy.   Skin:     General: Skin is warm and dry.      Comments: Color WNL   Neurological:      Mental Status: She is alert. Mental status is at baseline.      Motor: No weakness, tremor, abnormal muscle tone or pronator drift.      Coordination: Coordination is intact. Coordination normal. Heel to Shin Test normal.      Deep Tendon Reflexes:      Reflex Scores:       Bicep reflexes are 2+ on the right side and 2+ on the left side.       Brachioradialis reflexes are 2+ on the right side and 2+ on the left side.       Patellar reflexes are 2+ on the right side and 2+ on the left side.     Comments:   No pronator drift on exam.    CN II, III, IV, VI: EOMI, PERRLA  CN V: Equal sensation to face bilaterally.  CN VII: Closes eyes  against resistance and symmetical smile bilaterally  CN VII: Hearing grossly intact  CN IX, X: Elevation of palate and uvula symmetrical.   CN XI: Shoulder shrug against resistance equal bilaterally.  CN XII: No deviation on tongue protrusion.    Shoulder abduction left 5/5 right 5/5  Elbow flexion left 5/5 right 5/5  Elbow extension left 5/5 right 5/5   strength left 5/5 right 5/5  Hip flexion left 5/5 right 5/5  Knee extension left 5/5 right 5/5  Knee flexion left 5/5 right 5/5       Psychiatric:         Mood and Affect: Mood normal.         Behavior: Behavior normal.         Assessment:       1. Anemia, unspecified type    2. Hyponatremia    3. Hypokalemia    4. Syncope, unspecified syncope type    5. Nutritional anemia        Plan:       Anemia, unspecified type  -     Pathologist Interpretation Differential; Future  -     Vitamin B12; Future  -     Folate; Future  -     Reticulocytes; Future  -     Iron and TIBC; Future  -     Ferritin; Future  -     CBC Auto Differential; Future    Hyponatremia  -     COMPREHENSIVE METABOLIC PANEL; Future  -     Cancel: Sodium, urine, random; Future  -     Osmolality, Serum; Future  -     Osmolality, urine; Future  -     Urinalysis, Reflex to Urine Culture Urine, Clean Catch; Future    Hypokalemia  -     COMPREHENSIVE METABOLIC PANEL; Future    Syncope, unspecified syncope type  -     COMPREHENSIVE METABOLIC PANEL; Future  -     Pathologist Interpretation Differential; Future  -     Vitamin B12; Future  -     Folate; Future  -     Reticulocytes; Future  -     Iron and TIBC; Future  -     Ferritin; Future  -     CBC Auto Differential; Future  -     Sodium, urine, random; Future  -     Osmolality, Serum; Future  -     Osmolality, urine; Future  -     Urinalysis, Reflex to Urine Culture Urine, Clean Catch; Future    Nutritional anemia  -     Vitamin B12; Future  -     Folate; Future      Annamarie Edwards MD  6/21/2023

## 2023-06-21 NOTE — DISCHARGE SUMMARY
"Discharge Summary  Hospital Medicine    Attending Provider on Discharge: Burak Zuleta     Discharging Team: [facility]     Date of Admission:  6/17/2023         Date of Discharge:  6/18/2023      Diagnoses:     Principal Problem(s):   Syncope and collapse     Secondary Problems:  Active Hospital Problems    Diagnosis    *Syncope and collapse    Hyponatremia    HTN (hypertension), benign    Dyslipidemia        Hospital Course:      63F HTN, HLD, anxiety 2/2 presenting to the ER after a syncopal episode while she was at a wedding. She cannot recall the episode and did not notice any prodromal symptoms today. She does note of L sided chest pain, sharp, worse with deep inspiration, and worsened jaw and shoulder/neck pain earlier today which prompted her to go get evaluated. She also had had worsening BLE swelling for the past 3 weeks. She had this issue in 11/2022 with US showing no DVTs. She reports compliance with BP meds including Diovan and Norvasc today w/ BP usually running 120/130s. She currently denies fever, chills, CP, SOB, or any other symptoms. Pt admitted for observation due to high HEART score. Troponin and BNP negative. CXR relatively unchanged. Labs with finding of Na 128 which is new for patient. Urine sodium <10. EtOH levels 217. Patient was treated with IV hydration which resulted in correction of sodium values, c/w hypovolemic hyponatremia. UTOX otherwise negative. CTH/c-spine without acute insult, while CTA did not show PE. ECHO was unremarkable. Patient's syncope episode occurred at wedding with no air condition and thus it was thought her syncope was due to dehydration as well as possible vasovagal component. Patient encouraged to ensure adequate hydration, especially if consuming EtOH beverages. Patient discharged on home valsartan and amlodipine held. She should f/u with PCP within 1 weeks time.    BP (!) 149/68   Pulse 79   Temp 98.6 °F (37 °C)   Resp 14   Ht 5' 7" (1.702 m)   Wt " 76.7 kg (169 lb)   LMP 07/26/2016   SpO2 (!) 94%   BMI 26.47 kg/m²    Physical Exam  Constitutional:       General: She is not in acute distress.     Appearance: She is not diaphoretic.   HENT:      Head: Normocephalic and atraumatic.   Eyes:      Conjunctiva/sclera: Conjunctivae normal.      Pupils: Pupils are equal, round, and reactive to light.   Neck:      Vascular: No JVD.      Trachea: No tracheal deviation.   Cardiovascular:      Rate and Rhythm: Normal rate and regular rhythm.      Heart sounds: Normal heart sounds. No murmur heard.  Pulmonary:      Effort: Pulmonary effort is normal. No respiratory distress.      Breath sounds: Normal breath sounds. No wheezing.   Abdominal:      General: Bowel sounds are normal. There is no distension.      Palpations: Abdomen is soft.      Tenderness: There is no abdominal tenderness.   Musculoskeletal:         General: No tenderness. Normal range of motion.      Cervical back: Normal range of motion and neck supple.   Skin:     General: Skin is warm and dry.      Findings: No erythema.   Neurological:      Mental Status: She is alert and oriented to person, place, and time.      Motor: No abnormal muscle tone.        Significant Diagnostic Studies:   Labs:   No results for input(s): WBC, RBC, HGB, HCT, PLT, MCV, MCH, MCHC, GRAN, LYMPH, MONO, EOS in the last 72 hours.   Recent Labs     06/18/23  1446   GLU 86      K 4.4      CO2 19*   BUN 7*   CREATININE 0.6   CALCIUM 9.2   ANIONGAP 10        Microbiology:   No results found for: DEBBY  Urine Culture, Routine   Date Value Ref Range Status   06/18/2021 ESCHERICHIA COLI  10,000 - 49,999 cfu/ml   (A)  Final     Aerobic Bacterial Culture   Date Value Ref Range Status   12/13/2022 (A)  Final    METHICILLIN RESISTANT STAPHYLOCOCCUS AUREUS  Many       No results found for: PHILIP    Radiology:   Results for orders placed during the hospital encounter of 12/13/17    X-Ray Chest 1 View    Narrative  One view:  Heart size is normal. Lungs are clear. Bones and bowel gas are noncontributory.  IMPRESSION:  No acute process seen.      Electronically signed by: ARELI BRITT MD  Date:     12/13/17  Time:    12:37     Results for orders placed in visit on 05/06/13    X-Ray Chest PA And Lateral    Narrative  PA and lateral views of the chest were obtained.  Comparison -- 3/18/10.  The heart size and pulmonary vessels are normal.  Lungs are expanded and clear.  Skeletal structures are intact with mild curvature in the spine.  IMPRESSION:  No active chest disease      Electronically signed by: NAYAN CHARLES MD  Date:     05/06/13  Time:    15:23     No results found for this or any previous visit.     Results for orders placed during the hospital encounter of 06/17/23    CT Head Without Contrast    Narrative  EXAMINATION:  CT HEAD WITHOUT CONTRAST    CLINICAL HISTORY:  Facial trauma, blunt;    TECHNIQUE:  Low dose axial CT images obtained throughout the head without intravenous contrast. Sagittal and coronal reconstructions were performed.    COMPARISON:  None.    FINDINGS:  Intracranial compartment:    Prominence of the ventricles and sulci compatible with mild generalized cerebral volume loss.  No hydrocephalus. No extra-axial blood or fluid collections.    No parenchymal mass, hemorrhage, edema or major vascular distribution infarct.    Ruddy cisterna magna versus arachnoid cyst in the posterior fossa.    Skull/extracranial contents (limited evaluation):    No fracture. Are maxillary membrane thickening.  Mastoid air cells are essentially clear.    Impression  No acute intracranial abnormality.    Additional findings as above.    Electronically signed by resident: Gabriela Min  Date:    06/18/2023  Time:    02:37    Electronically signed by: Georges Jarrell MD  Date:    06/18/2023  Time:    02:55       Cardiac Studies:     Significant Treatments/Procedures:   IVF    Consults while in Hospital:   None    Discharge  Medications:      Discharge Medication List as of 6/18/2023  5:42 PM        CONTINUE these medications which have CHANGED    Details   !! valsartan (DIOVAN) 80 MG tablet Take 1 tablet (80 mg total) by mouth once daily. TAKE UNTIL YOU SEE PCP OR YOU MEET CRITERIA MENTIONED ON VALSARTAN 320mg, Starting Sun 6/18/2023, Until Mon 6/17/2024, Normal      amLODIPine (NORVASC) 10 MG tablet Take 1 tablet (10 mg total) by mouth once daily. DO NOT TAKE UNTIL YOU FOLLOW UP WITH YOUR PRIMARY CARE DOCTOR, Starting Sun 6/18/2023, Normal      !! valsartan (DIOVAN) 320 MG tablet Take 1 tablet (320 mg total) by mouth every morning. DO NOT TAKE UNTIL YOU SEE YOUR PRIMARY CARE DOCTOR, TAKE THE 80mg UNLESS BP STARTS BEING IN THE 160s on the TOP OR ABOVE 90 ON THE BOTTOM THEN YOU CAN SWITCH BACK TO HIGHER DOSE, Starting Sun 6/18/2023 , Normal       !! - Potential duplicate medications found. Please discuss with provider.        CONTINUE these medications which have NOT CHANGED    Details   atorvastatin (LIPITOR) 80 MG tablet Take 1 tablet (80 mg total) by mouth every evening., Starting Thu 9/1/2022, Normal      CALCIUM ORAL Take by mouth., Historical Med      cholecalciferol, vitamin D3, (VITAMIN D3) 2,000 unit Cap Take 1 capsule (2,000 Units total) by mouth once daily., Starting Mon 1/21/2019, OTC      clobetasol 0.05% (TEMOVATE) 0.05 % Oint AAA bid avoid face/groin, Normal      diphth,pertus,acell,,tetanus (BOOSTRIX TDAP) 2.5-8-5 Lf-mcg-Lf/0.5mL Syrg injection Inject into the muscle., Starting Wed 2/1/2023, Normal      EScitalopram oxalate (LEXAPRO) 20 MG tablet Take 1 tablet (20 mg total) by mouth once daily., Starting Thu 9/1/2022, Normal      hydrOXYzine HCL (ATARAX) 10 MG Tab Take 1 tablet (10 mg total) by mouth 3 (three) times daily as needed (anxiety)., Starting Thu 11/10/2022, Normal      mupirocin (BACTROBAN) 2 % ointment Apply topically 2 (two) times daily. To back and nares, Starting Tue 12/13/2022, Normal      progesterone  (PROMETRIUM) 100 MG capsule TAKE ONE NIGHTLY, Normal      traZODone (DESYREL) 50 MG tablet Take 1 tablet (50 mg total) by mouth every evening. prn, Starting Thu 2022, Normal      triamcinolone acetonide 0.1% (KENALOG) 0.1 % ointment SMARTSI Topical Daily PRN, Historical Med      valACYclovir (VALTREX) 1000 MG tablet TAKE (1/2) HALF BY MOUTH EVERY 12 HOURS PRN, Normal              Discharge Diet:regular diet with Normal Fluid intake of 1500 - 2000 mL per day    Activity: activity as tolerated    Discharged Condition: Stable    Discharge Disposition: Home or Self Care    Follow-up:   Future Appointments   Date Time Provider Department Center   2023 10:30 AM LAB, [facility] [place]       Studies/Results pending on discharge:   None    Burak Zuleta MD  [facility]

## 2023-06-27 ENCOUNTER — LAB VISIT (OUTPATIENT)
Dept: LAB | Facility: HOSPITAL | Age: 63
End: 2023-06-27
Attending: STUDENT IN AN ORGANIZED HEALTH CARE EDUCATION/TRAINING PROGRAM
Payer: MEDICARE

## 2023-06-27 DIAGNOSIS — E87.1 HYPONATREMIA: ICD-10-CM

## 2023-06-27 DIAGNOSIS — R55 SYNCOPE, UNSPECIFIED SYNCOPE TYPE: ICD-10-CM

## 2023-06-27 DIAGNOSIS — D64.9 ANEMIA, UNSPECIFIED TYPE: ICD-10-CM

## 2023-06-27 DIAGNOSIS — D53.9 NUTRITIONAL ANEMIA: ICD-10-CM

## 2023-06-27 DIAGNOSIS — E87.6 HYPOKALEMIA: ICD-10-CM

## 2023-06-27 LAB
ALBUMIN SERPL BCP-MCNC: 3.8 G/DL (ref 3.5–5.2)
ALP SERPL-CCNC: 78 U/L (ref 55–135)
ALT SERPL W/O P-5'-P-CCNC: 20 U/L (ref 10–44)
ANION GAP SERPL CALC-SCNC: 13 MMOL/L (ref 8–16)
AST SERPL-CCNC: 27 U/L (ref 10–40)
BASOPHILS # BLD AUTO: 0.05 K/UL (ref 0–0.2)
BASOPHILS NFR BLD: 0.6 % (ref 0–1.9)
BILIRUB SERPL-MCNC: 0.4 MG/DL (ref 0.1–1)
BUN SERPL-MCNC: 7 MG/DL (ref 8–23)
CALCIUM SERPL-MCNC: 9.9 MG/DL (ref 8.7–10.5)
CHLORIDE SERPL-SCNC: 104 MMOL/L (ref 95–110)
CO2 SERPL-SCNC: 21 MMOL/L (ref 23–29)
CREAT SERPL-MCNC: 0.7 MG/DL (ref 0.5–1.4)
DIFFERENTIAL METHOD: ABNORMAL
EOSINOPHIL # BLD AUTO: 0.3 K/UL (ref 0–0.5)
EOSINOPHIL NFR BLD: 3.2 % (ref 0–8)
ERYTHROCYTE [DISTWIDTH] IN BLOOD BY AUTOMATED COUNT: 13.4 % (ref 11.5–14.5)
EST. GFR  (NO RACE VARIABLE): >60 ML/MIN/1.73 M^2
FERRITIN SERPL-MCNC: 298 NG/ML (ref 20–300)
FOLATE SERPL-MCNC: 9.9 NG/ML (ref 4–24)
GLUCOSE SERPL-MCNC: 85 MG/DL (ref 70–110)
HCT VFR BLD AUTO: 40.8 % (ref 37–48.5)
HGB BLD-MCNC: 13.2 G/DL (ref 12–16)
IMM GRANULOCYTES # BLD AUTO: 0.05 K/UL (ref 0–0.04)
IMM GRANULOCYTES NFR BLD AUTO: 0.6 % (ref 0–0.5)
IRON SERPL-MCNC: 82 UG/DL (ref 30–160)
LYMPHOCYTES # BLD AUTO: 1.6 K/UL (ref 1–4.8)
LYMPHOCYTES NFR BLD: 20 % (ref 18–48)
MCH RBC QN AUTO: 31.8 PG (ref 27–31)
MCHC RBC AUTO-ENTMCNC: 32.4 G/DL (ref 32–36)
MCV RBC AUTO: 98 FL (ref 82–98)
MONOCYTES # BLD AUTO: 0.6 K/UL (ref 0.3–1)
MONOCYTES NFR BLD: 7.8 % (ref 4–15)
NEUTROPHILS # BLD AUTO: 5.4 K/UL (ref 1.8–7.7)
NEUTROPHILS NFR BLD: 67.8 % (ref 38–73)
NRBC BLD-RTO: 0 /100 WBC
OSMOLALITY SERPL: 278 MOSM/KG (ref 275–295)
PATH REV BLD -IMP: NORMAL
PLATELET # BLD AUTO: 405 K/UL (ref 150–450)
PMV BLD AUTO: 9.4 FL (ref 9.2–12.9)
POTASSIUM SERPL-SCNC: 4.1 MMOL/L (ref 3.5–5.1)
PROT SERPL-MCNC: 7.5 G/DL (ref 6–8.4)
RBC # BLD AUTO: 4.15 M/UL (ref 4–5.4)
RETICS/RBC NFR AUTO: 2.1 % (ref 0.5–2.5)
SATURATED IRON: 21 % (ref 20–50)
SODIUM SERPL-SCNC: 138 MMOL/L (ref 136–145)
TOTAL IRON BINDING CAPACITY: 398 UG/DL (ref 250–450)
TRANSFERRIN SERPL-MCNC: 269 MG/DL (ref 200–375)
VIT B12 SERPL-MCNC: 281 PG/ML (ref 210–950)
WBC # BLD AUTO: 7.91 K/UL (ref 3.9–12.7)

## 2023-06-27 PROCEDURE — 85045 AUTOMATED RETICULOCYTE COUNT: CPT | Performed by: STUDENT IN AN ORGANIZED HEALTH CARE EDUCATION/TRAINING PROGRAM

## 2023-06-27 PROCEDURE — 36415 COLL VENOUS BLD VENIPUNCTURE: CPT | Mod: PO | Performed by: STUDENT IN AN ORGANIZED HEALTH CARE EDUCATION/TRAINING PROGRAM

## 2023-06-27 PROCEDURE — 84466 ASSAY OF TRANSFERRIN: CPT | Performed by: STUDENT IN AN ORGANIZED HEALTH CARE EDUCATION/TRAINING PROGRAM

## 2023-06-27 PROCEDURE — 85025 COMPLETE CBC W/AUTO DIFF WBC: CPT | Performed by: STUDENT IN AN ORGANIZED HEALTH CARE EDUCATION/TRAINING PROGRAM

## 2023-06-27 PROCEDURE — 83930 ASSAY OF BLOOD OSMOLALITY: CPT | Performed by: STUDENT IN AN ORGANIZED HEALTH CARE EDUCATION/TRAINING PROGRAM

## 2023-06-27 PROCEDURE — 82607 VITAMIN B-12: CPT | Performed by: STUDENT IN AN ORGANIZED HEALTH CARE EDUCATION/TRAINING PROGRAM

## 2023-06-27 PROCEDURE — 82728 ASSAY OF FERRITIN: CPT | Performed by: STUDENT IN AN ORGANIZED HEALTH CARE EDUCATION/TRAINING PROGRAM

## 2023-06-27 PROCEDURE — 80053 COMPREHEN METABOLIC PANEL: CPT | Performed by: STUDENT IN AN ORGANIZED HEALTH CARE EDUCATION/TRAINING PROGRAM

## 2023-06-27 PROCEDURE — 85060 PATHOLOGIST REVIEW: ICD-10-PCS | Mod: ,,, | Performed by: PATHOLOGY

## 2023-06-27 PROCEDURE — 82746 ASSAY OF FOLIC ACID SERUM: CPT | Performed by: STUDENT IN AN ORGANIZED HEALTH CARE EDUCATION/TRAINING PROGRAM

## 2023-06-27 PROCEDURE — 85060 BLOOD SMEAR INTERPRETATION: CPT | Mod: ,,, | Performed by: PATHOLOGY

## 2023-06-28 LAB — PATH REV BLD -IMP: NORMAL

## 2023-06-29 ENCOUNTER — PATIENT MESSAGE (OUTPATIENT)
Dept: INTERNAL MEDICINE | Facility: CLINIC | Age: 63
End: 2023-06-29
Payer: MEDICARE

## 2023-08-11 ENCOUNTER — PATIENT MESSAGE (OUTPATIENT)
Dept: INTERNAL MEDICINE | Facility: CLINIC | Age: 63
End: 2023-08-11
Payer: MEDICARE

## 2023-08-14 VITALS — SYSTOLIC BLOOD PRESSURE: 130 MMHG | DIASTOLIC BLOOD PRESSURE: 80 MMHG

## 2023-08-14 RX ORDER — VALSARTAN 80 MG/1
160 TABLET ORAL DAILY
Qty: 180 TABLET | Refills: 3 | Status: SHIPPED | OUTPATIENT
Start: 2023-08-14 | End: 2024-08-13

## 2023-08-14 NOTE — TELEPHONE ENCOUNTER
Care Due:                  Date            Visit Type   Department     Provider  --------------------------------------------------------------------------------                                EP -                              PRIMARY      [facility] INTERNAL  Last Visit: 06-      CARE ([facility])   MEDICINE       Annamarie Edwards  Next Visit: None Scheduled  None         None Found                                                            Last  Test          Frequency    Reason                     Performed    Due Date  --------------------------------------------------------------------------------    Lipid Panel.  12 months..  atorvastatin.............  10-   10-    Health [facility] Embedded Care Due Messages. Reference number: [id number].   8/14/2023 10:10:15 AM CDT

## 2023-08-15 ENCOUNTER — TELEPHONE (OUTPATIENT)
Dept: INTERNAL MEDICINE | Facility: CLINIC | Age: 63
End: 2023-08-15
Payer: MEDICARE

## 2023-10-04 DIAGNOSIS — F41.1 GAD (GENERALIZED ANXIETY DISORDER): ICD-10-CM

## 2023-10-04 RX ORDER — ESCITALOPRAM OXALATE 20 MG/1
20 TABLET ORAL DAILY
Qty: 90 TABLET | Refills: 3 | Status: SHIPPED | OUTPATIENT
Start: 2023-10-04

## 2023-10-04 NOTE — TELEPHONE ENCOUNTER
Refill Routing Note   Medication(s) are not appropriate for processing by [facility] for the following reason(s):      No active prescription written by provider    [facility] action(s):  Defer Care Due:  None identified            Appointments  past 12m or future 3m with PCP    Date Provider   Last Visit   6/21/2023 Annamarie Edwards MD   Next Visit   Visit date not found Annamarie Edwards MD   ED visits in past 90 days: 0        Note composed:12:58 PM 10/04/2023

## 2023-10-04 NOTE — TELEPHONE ENCOUNTER
No care due was identified.  [facility] Embedded Care Due Messages. Reference number: [id number].   10/04/2023 12:34:32 PM CDT

## 2023-11-29 DIAGNOSIS — E78.5 HYPERLIPIDEMIA, UNSPECIFIED HYPERLIPIDEMIA TYPE: ICD-10-CM

## 2023-11-29 RX ORDER — ATORVASTATIN CALCIUM 80 MG/1
80 TABLET, FILM COATED ORAL NIGHTLY
Qty: 90 TABLET | Refills: 0 | Status: SHIPPED | OUTPATIENT
Start: 2023-11-29 | End: 2024-03-19

## 2023-11-29 NOTE — TELEPHONE ENCOUNTER
----- Message from Naet Knapp sent at 11/28/2023  4:32 PM CST -----  Type: RX Refill Request    Who Called: [facility]    Have you contacted your pharmacy:no    Refill or New Rx:refill    RX Name and Strength:atorvastatin (LIPITOR) 80 MG tablet    Preferred Pharmacy with phone number:[facility] ([place] - [place] [address] - [address]   Phone: [phone]  Fax: [phone]          Local or Mail Order:local    Would the patient rather a call back or a response via [facility]? call    Best Call Back Number:.[phone] (home)       Additional Information:

## 2023-11-29 NOTE — TELEPHONE ENCOUNTER
Care Due:                  Date            Visit Type   Department     Provider  --------------------------------------------------------------------------------                                EP -                              PRIMARY      [facility] INTERNAL  Last Visit: 06-      CARE ([facility])   MEDICINE       Annamarie Edwards  Next Visit: None Scheduled  None         None Found                                                            Last  Test          Frequency    Reason                     Performed    Due Date  --------------------------------------------------------------------------------    Lipid Panel.  12 months..  atorvastatin.............  10-   10-    Health [facility] Embedded Care Due Messages. Reference number: [id number].   11/29/2023 6:57:07 AM CST

## 2023-11-29 NOTE — TELEPHONE ENCOUNTER
Refill Routing Note   Medication(s) are not appropriate for processing by [facility] for the following reason(s):        Required labs outdated  Responsible provider unclear    [facility] action(s):  Defer     Requires labs : Yes             Appointments  past 12m or future 3m with PCP    Date Provider   Last Visit   11/10/2022 Meredith Izaguirre MD   Next Visit   Visit date not found Meredith Izaguirre MD   ED visits in past 90 days: 0        Note composed:7:03 AM 11/29/2023

## 2023-12-11 DIAGNOSIS — Z78.0 MENOPAUSE: ICD-10-CM

## 2023-12-11 RX ORDER — PROGESTERONE 100 MG/1
CAPSULE ORAL
Qty: 90 CAPSULE | Refills: 0 | Status: SHIPPED | OUTPATIENT
Start: 2023-12-11

## 2023-12-11 NOTE — TELEPHONE ENCOUNTER
Refill Decision Note   Odilia Goldy  is requesting a refill authorization.  Brief Assessment and Rationale for Refill:  Approve     Medication Therapy Plan:       Medication Reconciliation Completed: No   Comments:     No Care Gaps recommended.     Note composed:12:59 PM 12/11/2023

## 2024-01-11 DIAGNOSIS — Z00.00 ENCOUNTER FOR MEDICARE ANNUAL WELLNESS EXAM: ICD-10-CM

## 2024-02-19 NOTE — PROGRESS NOTES
Refill Authorization Note   Odilia Winslow is requesting a refill authorization.  Brief assessment and rationale for refill: Approve     Medication Therapy Plan: CDMR: FOVS    Medication reconciliation completed: No   Comments:   Orders Placed This Encounter    escitalopram oxalate (LEXAPRO) 20 MG tablet      Requested Prescriptions   Signed Prescriptions Disp Refills    escitalopram oxalate (LEXAPRO) 20 MG tablet 90 tablet 0     Sig: Take 1 tablet (20 mg total) by mouth once daily.       Psychiatry:  Antidepressants - SSRI Passed - 10/21/2020  3:32 PM        Passed - Patient is at least 18 years old        Passed - Office Visit within last 12 months or future 90 days.     Recent Outpatient Visits            4 months ago HTN (hypertension), benign    [facility] Ross Lopes MD    1 year ago Annual physical exam    [facility] [place] [address] Meredith Izaguirre MD    1 year ago Upper respiratory tract infection, unspecified type    [facility] [place] Hubert Espinal MD    1 year ago Encounter for gynecological examination    [facility] Odilia Perez MD    2 years ago Annual physical exam    [facility] [address] Meredith Izaguirre MD          Future Appointments              In 2 months Meredith Izaguirre MD [facility] [address]                    Appointments  past 12m or future 3m with PCP    Date Provider   Last Visit   7/16/2019 Meredith Izaguirre MD   Next Visit   12/23/2020 Meredith Izaguirre MD   ED visits in past 90 days: 0     Note composed:7:10 PM 10/21/2020             
1) Follow up with your doctor and return tomorrow for social work consult  2) Return to the ED immediately for new or worsening symptoms   3) Please continue to take any home medications as prescribed  4) Your test results from your ED visit were discussed with you prior to discharge  5) You were provided with a copy of your test results

## 2024-03-09 NOTE — TELEPHONE ENCOUNTER
----- Message from Laura White sent at 7/14/2022  8:02 AM CDT -----  Message    Good morning.I took a home test n positive for uti. Could u call in antibiotics to [facility]? Thanks         
Left message on voicemail to get update on symptoms.    Have not seen patient since 2020.  
Left message on voicemail x2    
09-Mar-2024 05:27

## 2024-03-15 DIAGNOSIS — E78.5 HYPERLIPIDEMIA, UNSPECIFIED HYPERLIPIDEMIA TYPE: ICD-10-CM

## 2024-03-15 NOTE — TELEPHONE ENCOUNTER
Care Due:                  Date            Visit Type   Department     Provider  --------------------------------------------------------------------------------                                EP -                              PRIMARY      [facility] INTERNAL  Last Visit: 06-      CARE ([facility])   MEDICINE       Annamarie Edwards  Next Visit: None Scheduled  None         None Found                                                            Last  Test          Frequency    Reason                     Performed    Due Date  --------------------------------------------------------------------------------    Lipid Panel.  12 months..  atorvastatin.............  10-   10-    [facility] Embedded Care Due Messages. Reference number: [id number].   3/15/2024 2:08:27 PM CDT

## 2024-03-16 NOTE — TELEPHONE ENCOUNTER
Refill Routing Note   Medication(s) are not appropriate for processing by [facility] for the following reason(s):        Required labs outdated    [facility] action(s):  Defer     Requires labs : Yes             Appointments  past 12m or future 3m with PCP    Date Provider   Last Visit   6/21/2023 Annamarie Edwards MD   Next Visit   Visit date not found Annamarie Edwards MD   ED visits in past 90 days: 0        Note composed:4:02 PM 03/16/2024

## 2024-03-19 RX ORDER — ATORVASTATIN CALCIUM 80 MG/1
80 TABLET, FILM COATED ORAL NIGHTLY
Qty: 90 TABLET | Refills: 3 | Status: SHIPPED | OUTPATIENT
Start: 2024-03-19

## 2024-05-14 DIAGNOSIS — I10 ESSENTIAL HYPERTENSION: Primary | ICD-10-CM

## 2024-05-14 RX ORDER — VALSARTAN 160 MG/1
160 TABLET ORAL DAILY
Qty: 90 TABLET | Refills: 3 | Status: SHIPPED | OUTPATIENT
Start: 2024-05-14

## 2024-05-14 NOTE — TELEPHONE ENCOUNTER
Care Due:                  Date            Visit Type   Department     Provider  --------------------------------------------------------------------------------                                EP -                              PRIMARY      [facility] INTERNAL  Last Visit: 06-      CARE ([facility])   MEDICINE       Annamarie Edwards  Next Visit: None Scheduled  None         None Found                                                            Last  Test          Frequency    Reason                     Performed    Due Date  --------------------------------------------------------------------------------    CMP.........  12 months..  atorvastatin, valsartan..  06- 06-    [facility] Embedded Care Due Messages. Reference number: [id number].   5/14/2024 11:44:00 AM CDT

## 2024-05-14 NOTE — TELEPHONE ENCOUNTER
Refill Routing Note   Medication(s) are not appropriate for processing by [facility] for the following reason(s):        No active prescription written by provider    [facility] action(s):  Defer     Requires labs : Yes             Appointments  past 12m or future 3m with PCP    Date Provider   Last Visit   6/21/2023 Annamarie Edwards MD   Next Visit   Visit date not found Annamarie Edwards MD   ED visits in past 90 days: 0        Note composed:1:25 PM 05/14/2024

## 2024-05-28 ENCOUNTER — PATIENT MESSAGE (OUTPATIENT)
Dept: INTERNAL MEDICINE | Facility: CLINIC | Age: 64
End: 2024-05-28
Payer: MEDICARE

## 2024-05-29 ENCOUNTER — OFFICE VISIT (OUTPATIENT)
Dept: INTERNAL MEDICINE | Facility: CLINIC | Age: 64
End: 2024-05-29
Payer: MEDICARE

## 2024-05-29 VITALS
OXYGEN SATURATION: 97 % | BODY MASS INDEX: 26.37 KG/M2 | HEART RATE: 84 BPM | WEIGHT: 168 LBS | SYSTOLIC BLOOD PRESSURE: 148 MMHG | DIASTOLIC BLOOD PRESSURE: 84 MMHG | HEIGHT: 67 IN

## 2024-05-29 DIAGNOSIS — L03.90 CELLULITIS, UNSPECIFIED CELLULITIS SITE: Primary | ICD-10-CM

## 2024-05-29 DIAGNOSIS — L02.92 FURUNCLE: ICD-10-CM

## 2024-05-29 DIAGNOSIS — L08.9 SUPERFICIAL SKIN INFECTION: ICD-10-CM

## 2024-05-29 DIAGNOSIS — L08.9 SKIN INFECTION: ICD-10-CM

## 2024-05-29 DIAGNOSIS — I10 HTN (HYPERTENSION), BENIGN: ICD-10-CM

## 2024-05-29 DIAGNOSIS — Z86.14 HISTORY OF MRSA INFECTION: ICD-10-CM

## 2024-05-29 PROCEDURE — 3079F DIAST BP 80-89 MM HG: CPT | Mod: HCNC,CPTII,S$GLB, | Performed by: STUDENT IN AN ORGANIZED HEALTH CARE EDUCATION/TRAINING PROGRAM

## 2024-05-29 PROCEDURE — 3077F SYST BP >= 140 MM HG: CPT | Mod: HCNC,CPTII,S$GLB, | Performed by: STUDENT IN AN ORGANIZED HEALTH CARE EDUCATION/TRAINING PROGRAM

## 2024-05-29 PROCEDURE — 3288F FALL RISK ASSESSMENT DOCD: CPT | Mod: HCNC,CPTII,S$GLB, | Performed by: STUDENT IN AN ORGANIZED HEALTH CARE EDUCATION/TRAINING PROGRAM

## 2024-05-29 PROCEDURE — 3008F BODY MASS INDEX DOCD: CPT | Mod: HCNC,CPTII,S$GLB, | Performed by: STUDENT IN AN ORGANIZED HEALTH CARE EDUCATION/TRAINING PROGRAM

## 2024-05-29 PROCEDURE — 1101F PT FALLS ASSESS-DOCD LE1/YR: CPT | Mod: HCNC,CPTII,S$GLB, | Performed by: STUDENT IN AN ORGANIZED HEALTH CARE EDUCATION/TRAINING PROGRAM

## 2024-05-29 PROCEDURE — 1125F AMNT PAIN NOTED PAIN PRSNT: CPT | Mod: HCNC,CPTII,S$GLB, | Performed by: STUDENT IN AN ORGANIZED HEALTH CARE EDUCATION/TRAINING PROGRAM

## 2024-05-29 PROCEDURE — 4010F ACE/ARB THERAPY RXD/TAKEN: CPT | Mod: HCNC,CPTII,S$GLB, | Performed by: STUDENT IN AN ORGANIZED HEALTH CARE EDUCATION/TRAINING PROGRAM

## 2024-05-29 PROCEDURE — 99999 PR PBB SHADOW E&M-EST. PATIENT-LVL IV: CPT | Mod: PBBFAC,HCNC,, | Performed by: STUDENT IN AN ORGANIZED HEALTH CARE EDUCATION/TRAINING PROGRAM

## 2024-05-29 PROCEDURE — 1159F MED LIST DOCD IN RCRD: CPT | Mod: HCNC,CPTII,S$GLB, | Performed by: STUDENT IN AN ORGANIZED HEALTH CARE EDUCATION/TRAINING PROGRAM

## 2024-05-29 PROCEDURE — 99214 OFFICE O/P EST MOD 30 MIN: CPT | Mod: HCNC,S$GLB,, | Performed by: STUDENT IN AN ORGANIZED HEALTH CARE EDUCATION/TRAINING PROGRAM

## 2024-05-29 RX ORDER — MUPIROCIN 20 MG/G
OINTMENT TOPICAL 2 TIMES DAILY
Qty: 30 G | Refills: 0 | Status: SHIPPED | OUTPATIENT
Start: 2024-05-29 | End: 2024-06-05

## 2024-05-29 RX ORDER — DOXYCYCLINE 100 MG/1
100 CAPSULE ORAL EVERY 12 HOURS
Qty: 14 CAPSULE | Refills: 0 | Status: SHIPPED | OUTPATIENT
Start: 2024-05-29 | End: 2024-06-05

## 2024-05-29 NOTE — PROGRESS NOTES
[facility]    Subjective:       Patient ID: Odilia Winslow is a 64 y.o. female.    Chief Complaint: Leg Pain (right)      History was obtained from the patient and supplemented through chart review.  This patient is normally followed by a colleague of erwin, who is unavailable today.  The patient is new to me.    HPI:    Patient is a 64 y.o. female who presents for left shin erythema, pain, lump    Pain, redness front of shin left leg  None in calf, none extending into thigh or joint    Hx of staph infection in similar location after moh[facility] surgery summer 2023  -Seen [facility] with Dr. Kumar/Dr. Bradley, referred to surgical center  -seen by Dr. Love with [facility]  -then [facility]  -Now following with Dr. Duron    Onset: started last week, better today  Location/radiation: mid shin  Duration: 1 week  Associated sx: none  Attempted treatments: tried steroid cream  Pain Scale 0-10/10:   Prior occurrences: 2    Had not taken decolonization seriously in the past, states was prescribed, but never really was dedicated about it. Encouraged to try again.    Advised pt make appt with Derm because of hx of complications after surg, multiple nodules on skin, picking disorder    Medical History  Past Medical History:   Diagnosis Date    Anxiety     Esophageal stricture     GERD (gastroesophageal reflux disease)     H/O cold sores     Hyperlipidemia     Insomnia     Irritable bowel syndrome     Postmenopausal bleeding        Review of Systems   Skin:  Positive for color change and wound.         Surgical hx, family hx, social hx   Have been reviewed      Current Outpatient Medications:     atorvastatin (LIPITOR) 80 MG tablet, TAKE 1 TABLET (80 MG TOTAL) BY MOUTH EVERY EVENING., Disp: 90 tablet, Rfl: 3    CALCIUM ORAL, Take by mouth., Disp: , Rfl:     cholecalciferol, vitamin D3, (VITAMIN D3) 2,000 unit Cap, Take 1 capsule (2,000 Units total) by mouth once daily., Disp: , Rfl:     clobetasol 0.05%  "(TEMOVATE) 0.05 % Oint, AAA bid avoid face/groin, Disp: 60 g, Rfl: 3    EScitalopram oxalate (LEXAPRO) 20 MG tablet, TAKE 1 TABLET (20 MG TOTAL) BY MOUTH ONCE DAILY., Disp: 90 tablet, Rfl: 3    hydrOXYzine HCL (ATARAX) 10 MG Tab, Take 1 tablet (10 mg total) by mouth 3 (three) times daily as needed (anxiety)., Disp: 60 tablet, Rfl: 3    progesterone (PROMETRIUM) 100 MG capsule, TAKE ONE NIGHTLY, Disp: 90 capsule, Rfl: 0    traZODone (DESYREL) 50 MG tablet, Take 1 tablet (50 mg total) by mouth every evening. prn, Disp: 90 tablet, Rfl: 1    valACYclovir (VALTREX) 1000 MG tablet, TAKE (1/2) HALF BY MOUTH EVERY 12 HOURS PRN, Disp: 30 tablet, Rfl: 2    valsartan (DIOVAN) 160 MG tablet, Take 1 tablet (160 mg total) by mouth once daily. TAKE UNTIL YOU SEE PCP OR YOU MEET CRITERIA MENTIONED ON VALSARTAN 320mg, Disp: 90 tablet, Rfl: 3    diphth,pertus,acell,,tetanus (BOOSTRIX TDAP) 2.5-8-5 Lf-mcg-Lf/0.5mL Syrg injection, Inject into the muscle., Disp: 0.5 mL, Rfl: 0    doxycycline (VIBRAMYCIN) 100 MG Cap, Take 1 capsule (100 mg total) by mouth every 12 (twelve) hours. for 7 days, Disp: 14 capsule, Rfl: 0    mupirocin (BACTROBAN) 2 % ointment, Apply topically 2 (two) times daily. To nares. Use clean swab each nare, each time. for 7 days, Disp: 30 g, Rfl: 0    triamcinolone acetonide 0.1% (KENALOG) 0.1 % ointment, SMARTSI Topical Daily PRN (Patient not taking: Reported on 2024), Disp: , Rfl:     Current Facility-Administered Medications:     triamcinolone acetonide injection 10 mg, 10 mg, Intradermal, Once, Ivett Goodman MD    Objective:        Body mass index is 26.31 kg/m².  Vitals:    24 0832   BP: (!) 148/84   Pulse: 84   SpO2: 97%   Weight: 76.2 kg (167 lb 15.9 oz)   Height: 5' 7" (1.702 m)   PainSc:   2     Physical Exam  Vitals and nursing note reviewed.   Constitutional:       General: She is not in acute distress.     Appearance: Normal appearance. She is not ill-appearing.   HENT:      Head: " "Normocephalic and atraumatic.   Eyes:      General: No scleral icterus.  Pulmonary:      Effort: Pulmonary effort is normal.   Musculoskeletal:         General: No deformity.   Skin:     Comments: Right lateral thigh scant blood, pt states from skin picking  Left anterior mid shin, erythema, firm induration, approx 3 cm in diam, raised 0.5 cm, non-tender, non-fluctuant   Neurological:      Mental Status: She is alert and oriented to person, place, and time.   Psychiatric:         Behavior: Behavior normal.         Left shin medial view      Left shin lateral view      Left shin anterior view    Lab Results   Component Value Date    WBC 7.91 06/27/2023    HGB 13.2 06/27/2023    HCT 40.8 06/27/2023     06/27/2023    CHOL 233 (H) 10/13/2022    TRIG 118 10/13/2022    HDL 70 10/13/2022    ALT 20 06/27/2023    AST 27 06/27/2023     06/27/2023    K 4.1 06/27/2023     06/27/2023    CREATININE 0.7 06/27/2023    BUN 7 (L) 06/27/2023    CO2 21 (L) 06/27/2023    TSH 0.486 08/04/2022    INR 0.9 03/18/2010    GLUF 109 03/13/2007    HGBA1C 5.9 (H) 08/04/2022       The 10-year ASCVD risk score (Christopher LOPEZ, et al., 2019) is: 10.8%    Values used to calculate the score:      Age: 64 years      Sex: Female      Is Non- : No      Diabetic: No      Tobacco smoker: No      Systolic Blood Pressure: 148 mmHg      Is BP treated: Yes      HDL Cholesterol: 70 mg/dL      Total Cholesterol: 233 mg/dL    (Imaging have been independently reviewed)      Assessment:         1. Cellulitis, unspecified cellulitis site    2. Skin infection    3. HTN (hypertension), benign    4. History of MRSA infection    5. Furuncle    6. Superficial skin infection          Plan:     Odilia Tong" was seen today for leg pain.    Diagnoses and all orders for this visit:    Cellulitis, unspecified cellulitis site    Skin infection  -     doxycycline (VIBRAMYCIN) 100 MG Cap; Take 1 capsule (100 mg total) by mouth every 12 " (twelve) hours. for 7 days    HTN (hypertension), benign    History of MRSA infection    Furuncle    Superficial skin infection  -     mupirocin (BACTROBAN) 2 % ointment; Apply topically 2 (two) times daily. To nares. Use clean swab each nare, each time. for 7 days            Follow up in about 4 weeks (around 6/26/2024) for Dr. Edwards annual, BP f/u .        All medications were reviewed including potential side effects and risks/benefits.  Pt was counseled to call back if anything worsens or if questions arise.    Jimmy Humphreys MD  Family Medicine  [facility]  [address] [address]  Phone [phone]  Fax [phone]

## 2024-06-02 ENCOUNTER — PATIENT MESSAGE (OUTPATIENT)
Dept: INTERNAL MEDICINE | Facility: CLINIC | Age: 64
End: 2024-06-02
Payer: MEDICARE

## 2024-06-02 DIAGNOSIS — F41.1 GAD (GENERALIZED ANXIETY DISORDER): ICD-10-CM

## 2024-06-03 RX ORDER — ESCITALOPRAM OXALATE 20 MG/1
30 TABLET ORAL DAILY
Qty: 135 TABLET | Refills: 1 | Status: SHIPPED | OUTPATIENT
Start: 2024-06-03

## 2024-06-03 NOTE — TELEPHONE ENCOUNTER
Please let patient know increased Lexapro to 30 mg. If her symptoms are still uncontrolled after 1-2 months that is the highest dose so we may need to consider medication change. Patient also due for annual appt, please assist with scheduling. Thank you!

## 2024-06-25 DIAGNOSIS — Z78.0 MENOPAUSE: ICD-10-CM

## 2024-06-26 RX ORDER — PROGESTERONE 100 MG/1
CAPSULE ORAL
Qty: 90 CAPSULE | Refills: 1 | Status: SHIPPED | OUTPATIENT
Start: 2024-06-26

## 2024-06-26 NOTE — TELEPHONE ENCOUNTER
Refill Routing Note   Medication(s) are not appropriate for processing by [facility] for the following reason(s):        Patient not seen by provider within 15 months    [facility] action(s):  Defer             Appointments  past 12m or future 3m with PCP    Date Provider   Last Visit   2/1/2023 Amelia Diana MD   Next Visit   6/25/2024 Amelia Diana MD   ED visits in past 90 days: 0        Note composed:9:11 PM 06/25/2024

## 2024-07-31 DIAGNOSIS — L08.9 SUPERFICIAL SKIN INFECTION: ICD-10-CM

## 2024-08-01 RX ORDER — MUPIROCIN 20 MG/G
OINTMENT TOPICAL 2 TIMES DAILY
Qty: 22 G | Refills: 0 | Status: SHIPPED | OUTPATIENT
Start: 2024-08-01 | End: 2024-08-08

## 2024-08-17 DIAGNOSIS — F41.9 ANXIETY: ICD-10-CM

## 2024-08-19 RX ORDER — HYDROXYZINE HYDROCHLORIDE 10 MG/1
10 TABLET, FILM COATED ORAL 3 TIMES DAILY PRN
Qty: 60 TABLET | Refills: 3 | Status: SHIPPED | OUTPATIENT
Start: 2024-08-19

## 2024-08-22 ENCOUNTER — OFFICE VISIT (OUTPATIENT)
Dept: INTERNAL MEDICINE | Facility: CLINIC | Age: 64
End: 2024-08-22
Payer: MEDICARE

## 2024-08-22 ENCOUNTER — LAB VISIT (OUTPATIENT)
Dept: LAB | Facility: HOSPITAL | Age: 64
End: 2024-08-22
Attending: STUDENT IN AN ORGANIZED HEALTH CARE EDUCATION/TRAINING PROGRAM
Payer: MEDICARE

## 2024-08-22 ENCOUNTER — PATIENT MESSAGE (OUTPATIENT)
Dept: INTERNAL MEDICINE | Facility: CLINIC | Age: 64
End: 2024-08-22
Payer: MEDICARE

## 2024-08-22 VITALS
OXYGEN SATURATION: 99 % | WEIGHT: 170.19 LBS | DIASTOLIC BLOOD PRESSURE: 78 MMHG | BODY MASS INDEX: 26.66 KG/M2 | SYSTOLIC BLOOD PRESSURE: 132 MMHG | HEART RATE: 117 BPM

## 2024-08-22 DIAGNOSIS — R73.03 PREDIABETES: ICD-10-CM

## 2024-08-22 DIAGNOSIS — Z00.00 HEALTH MAINTENANCE EXAMINATION: ICD-10-CM

## 2024-08-22 DIAGNOSIS — E55.9 VITAMIN D DEFICIENCY: ICD-10-CM

## 2024-08-22 DIAGNOSIS — Z00.00 ANNUAL PHYSICAL EXAM: Primary | ICD-10-CM

## 2024-08-22 DIAGNOSIS — F41.1 GAD (GENERALIZED ANXIETY DISORDER): ICD-10-CM

## 2024-08-22 DIAGNOSIS — G47.00 INSOMNIA, UNSPECIFIED TYPE: ICD-10-CM

## 2024-08-22 DIAGNOSIS — Z78.0 ASYMPTOMATIC MENOPAUSAL STATE: ICD-10-CM

## 2024-08-22 DIAGNOSIS — E53.8 VITAMIN B12 DEFICIENCY: ICD-10-CM

## 2024-08-22 DIAGNOSIS — R73.09 OTHER ABNORMAL GLUCOSE: ICD-10-CM

## 2024-08-22 DIAGNOSIS — Z12.31 SCREENING MAMMOGRAM FOR BREAST CANCER: ICD-10-CM

## 2024-08-22 DIAGNOSIS — I70.0 AORTIC ATHEROSCLEROSIS: ICD-10-CM

## 2024-08-22 DIAGNOSIS — Z00.00 HEALTH MAINTENANCE EXAMINATION: Primary | ICD-10-CM

## 2024-08-22 DIAGNOSIS — I10 ESSENTIAL HYPERTENSION: ICD-10-CM

## 2024-08-22 DIAGNOSIS — Z12.11 SCREENING FOR COLORECTAL CANCER: ICD-10-CM

## 2024-08-22 DIAGNOSIS — Z12.12 SCREENING FOR COLORECTAL CANCER: ICD-10-CM

## 2024-08-22 DIAGNOSIS — E78.5 HYPERLIPIDEMIA, UNSPECIFIED HYPERLIPIDEMIA TYPE: ICD-10-CM

## 2024-08-22 DIAGNOSIS — Z23 NEED FOR VACCINATION: ICD-10-CM

## 2024-08-22 DIAGNOSIS — Z79.890 POST-MENOPAUSE ON HRT (HORMONE REPLACEMENT THERAPY): ICD-10-CM

## 2024-08-22 DIAGNOSIS — M85.89 OSTEOPENIA OF MULTIPLE SITES: ICD-10-CM

## 2024-08-22 LAB
25(OH)D3+25(OH)D2 SERPL-MCNC: 25 NG/ML (ref 30–96)
ALBUMIN SERPL BCP-MCNC: 3.7 G/DL (ref 3.5–5.2)
ALP SERPL-CCNC: 96 U/L (ref 55–135)
ALT SERPL W/O P-5'-P-CCNC: 21 U/L (ref 10–44)
ANION GAP SERPL CALC-SCNC: 9 MMOL/L (ref 8–16)
AST SERPL-CCNC: 32 U/L (ref 10–40)
BASOPHILS # BLD AUTO: 0.07 K/UL (ref 0–0.2)
BASOPHILS NFR BLD: 1.1 % (ref 0–1.9)
BILIRUB SERPL-MCNC: 0.5 MG/DL (ref 0.1–1)
BUN SERPL-MCNC: 7 MG/DL (ref 8–23)
CALCIUM SERPL-MCNC: 9.4 MG/DL (ref 8.7–10.5)
CHLORIDE SERPL-SCNC: 107 MMOL/L (ref 95–110)
CHOLEST SERPL-MCNC: 200 MG/DL (ref 120–199)
CHOLEST/HDLC SERPL: 2.7 {RATIO} (ref 2–5)
CO2 SERPL-SCNC: 23 MMOL/L (ref 23–29)
CREAT SERPL-MCNC: 0.8 MG/DL (ref 0.5–1.4)
DIFFERENTIAL METHOD BLD: NORMAL
EOSINOPHIL # BLD AUTO: 0.1 K/UL (ref 0–0.5)
EOSINOPHIL NFR BLD: 1.9 % (ref 0–8)
ERYTHROCYTE [DISTWIDTH] IN BLOOD BY AUTOMATED COUNT: 13.2 % (ref 11.5–14.5)
EST. GFR  (NO RACE VARIABLE): >60 ML/MIN/1.73 M^2
ESTIMATED AVG GLUCOSE: 120 MG/DL (ref 68–131)
GLUCOSE SERPL-MCNC: 113 MG/DL (ref 70–110)
HBA1C MFR BLD: 5.8 % (ref 4–5.6)
HCT VFR BLD AUTO: 42 % (ref 37–48.5)
HDLC SERPL-MCNC: 74 MG/DL (ref 40–75)
HDLC SERPL: 37 % (ref 20–50)
HGB BLD-MCNC: 13.8 G/DL (ref 12–16)
IMM GRANULOCYTES # BLD AUTO: 0.02 K/UL (ref 0–0.04)
IMM GRANULOCYTES NFR BLD AUTO: 0.3 % (ref 0–0.5)
LDLC SERPL CALC-MCNC: 108.6 MG/DL (ref 63–159)
LYMPHOCYTES # BLD AUTO: 1.8 K/UL (ref 1–4.8)
LYMPHOCYTES NFR BLD: 28.5 % (ref 18–48)
MCH RBC QN AUTO: 30.9 PG (ref 27–31)
MCHC RBC AUTO-ENTMCNC: 32.9 G/DL (ref 32–36)
MCV RBC AUTO: 94 FL (ref 82–98)
MONOCYTES # BLD AUTO: 0.5 K/UL (ref 0.3–1)
MONOCYTES NFR BLD: 7.9 % (ref 4–15)
NEUTROPHILS # BLD AUTO: 3.9 K/UL (ref 1.8–7.7)
NEUTROPHILS NFR BLD: 60.3 % (ref 38–73)
NONHDLC SERPL-MCNC: 126 MG/DL
NRBC BLD-RTO: 0 /100 WBC
PLATELET # BLD AUTO: 336 K/UL (ref 150–450)
PMV BLD AUTO: 9.4 FL (ref 9.2–12.9)
POTASSIUM SERPL-SCNC: 4.2 MMOL/L (ref 3.5–5.1)
PROT SERPL-MCNC: 7.3 G/DL (ref 6–8.4)
RBC # BLD AUTO: 4.47 M/UL (ref 4–5.4)
SODIUM SERPL-SCNC: 139 MMOL/L (ref 136–145)
TRIGL SERPL-MCNC: 87 MG/DL (ref 30–150)
TSH SERPL DL<=0.005 MIU/L-ACNC: 0.77 UIU/ML (ref 0.4–4)
VIT B12 SERPL-MCNC: 270 PG/ML (ref 210–950)
WBC # BLD AUTO: 6.46 K/UL (ref 3.9–12.7)

## 2024-08-22 PROCEDURE — 82306 VITAMIN D 25 HYDROXY: CPT | Mod: HCNC | Performed by: STUDENT IN AN ORGANIZED HEALTH CARE EDUCATION/TRAINING PROGRAM

## 2024-08-22 PROCEDURE — 82607 VITAMIN B-12: CPT | Mod: HCNC | Performed by: STUDENT IN AN ORGANIZED HEALTH CARE EDUCATION/TRAINING PROGRAM

## 2024-08-22 PROCEDURE — 84443 ASSAY THYROID STIM HORMONE: CPT | Mod: HCNC | Performed by: STUDENT IN AN ORGANIZED HEALTH CARE EDUCATION/TRAINING PROGRAM

## 2024-08-22 PROCEDURE — 80053 COMPREHEN METABOLIC PANEL: CPT | Mod: HCNC | Performed by: STUDENT IN AN ORGANIZED HEALTH CARE EDUCATION/TRAINING PROGRAM

## 2024-08-22 PROCEDURE — 85025 COMPLETE CBC W/AUTO DIFF WBC: CPT | Mod: HCNC | Performed by: STUDENT IN AN ORGANIZED HEALTH CARE EDUCATION/TRAINING PROGRAM

## 2024-08-22 PROCEDURE — 83036 HEMOGLOBIN GLYCOSYLATED A1C: CPT | Mod: HCNC | Performed by: STUDENT IN AN ORGANIZED HEALTH CARE EDUCATION/TRAINING PROGRAM

## 2024-08-22 PROCEDURE — 80061 LIPID PANEL: CPT | Mod: HCNC | Performed by: STUDENT IN AN ORGANIZED HEALTH CARE EDUCATION/TRAINING PROGRAM

## 2024-08-22 PROCEDURE — 99999 PR PBB SHADOW E&M-EST. PATIENT-LVL IV: CPT | Mod: PBBFAC,HCNC,, | Performed by: STUDENT IN AN ORGANIZED HEALTH CARE EDUCATION/TRAINING PROGRAM

## 2024-08-22 RX ORDER — ATORVASTATIN CALCIUM 80 MG/1
80 TABLET, FILM COATED ORAL NIGHTLY
Qty: 90 TABLET | Refills: 3 | Status: SHIPPED | OUTPATIENT
Start: 2024-08-22

## 2024-08-22 RX ORDER — VALSARTAN 320 MG/1
320 TABLET ORAL DAILY
Qty: 90 TABLET | Refills: 3 | Status: SHIPPED | OUTPATIENT
Start: 2024-08-22

## 2024-08-22 NOTE — PATIENT INSTRUCTIONS
Check BP at home 3-4 times weekly, keep log for review.   Contact office with home blood pressure logs in 2-3 weeks.

## 2024-08-22 NOTE — TELEPHONE ENCOUNTER
Pended annual labs. Pt scheduled today for an appt with Dr Edwards at 9:30 am and would like her labs drawn at [facility] before her appt this morning if possible.

## 2024-08-22 NOTE — PROGRESS NOTES
"Subjective:       Patient ID: Odilia Winslow is a 64 y.o. female.    Chief Complaint: Health maintenance examination [Z00.00]    Patient is established with me, here today for the following:    Health maintenance -   Colonoscopy performed SEP2012. Told to repeat in 10 years.  Denies family history of colorectal cancer.  Mammogram BI-RADS 1 in OCT2022. Due for repeat.  Denies history of abnormal mammogram.  Family history of breast cancer.  Denies family history of ovarian cancer.  Last pap performed FEB2023.   Completed pap screening.   Denies history of abnormal pap smear.  Family history of cardiac disease.  UTD on Tdap, COVID primary vaccinations.  Due for COVID, PCV20, shingles, RSV vaccinations.  Never smoker.  Drinks alcohol 4 times weekly, 1-2 drinks per sitting.  Denies drug use.  Completed HIV and hepatitis C screening.          Endorses exercising routinely.  Going to spin class for cardio, 3 times weekly.  Plans to start weigh training.    HTN -   Currently prescribed valsartan.  Patient endorses taking medication as directed.  Denies side effects or concerns while taking medication.  Patient not currently checking BP at home.   Due for micro albumin creatinine ratio.   BP Readings from Last 5 Encounters:  05/29/24 : (!) 148/84  08/14/23 : 130/80  06/21/23 : (!) 149/72  06/18/23 : (!) 149/68  02/01/23 : 120/60    Prediabetes -   Endorses overall healthy diet.   Due for diabetes screening.  Lab Results       Component                Value               Date                       HGBA1C                   5.9 (H)             08/04/2022                 HGBA1C                   6.0 (H)             08/09/2017                 HGBA1C                   5.7                 03/13/2007              HLD -   Endorses taking atorvastatin as directed   Denies side effects or concerns while taking medication  CTA 2023 with " mild calcific atherosclerosis"  Lab Results       Component                Value              " " Date                       CHOL                     233 (H)             10/13/2022            Lab Results       Component                Value               Date                       TRIG                     118                 10/13/2022            Lab Results       Component                Value               Date                       LDLCALC                  139.4               10/13/2022            Lab Results       Component                Value               Date                       HDL                      70                  10/13/2022            Pending results of lipid panel.    Taking Lexapro for anxiety with good effect  Taking trazodone for insomnia with good effect    Following with Dr. Diana for gynecology.  On HRT with progesterone PO    Osteopenia -   Vitamin D deficiency -   Completed DEXA in OCT2022.   Showed osteopenia.  "FRAX:  10% risk of a major osteoporotic fracture in the next 10 years.  1.4% risk of hip fracture in the next 10 years."  Denies family history of osteoporosis.  Currently taking vitamin D3 2000 IU daily.  Lab Results       Component                Value               Date                       PFAQNGRR82VH             16 (L)              10/13/2022                 [id number]             16 (L)              06/08/2021                 [id number]             55                  04/11/2019              Vitamin B12 deficiency -  Not currently taking vitamin B12 supplementation.   Lab Results       Component                Value               Date                       [id number]               281                 06/27/2023                      Review of Systems   Constitutional:  Negative for activity change, fatigue and fever.   Respiratory:  Negative for cough and shortness of breath.    Cardiovascular:  Negative for chest pain and palpitations.   Gastrointestinal:  Negative for abdominal pain, constipation, diarrhea, nausea and vomiting.   Neurological:  Negative for " syncope and headaches.         Current Outpatient Medications   Medication Instructions    atorvastatin (LIPITOR) 80 mg, Oral, Nightly    CALCIUM ORAL Oral    cholecalciferol (vitamin D3) (VITAMIN D3) 2,000 Units, Oral, Daily    clobetasol 0.05% (TEMOVATE) 0.05 % Oint AAA bid avoid face/groin    diphth,pertus,acell,,tetanus (BOOSTRIX TDAP) 2.5-8-5 Lf-mcg-Lf/0.5mL Syrg injection Intramuscular    EScitalopram oxalate (LEXAPRO) 30 mg, Oral, Daily    hydrOXYzine HCL (ATARAX) 10 mg, Oral, 3 times daily PRN    progesterone (PROMETRIUM) 100 MG capsule TAKE ONE NIGHTLY    traZODone (DESYREL) 50 mg, Oral, Nightly, prn    triamcinolone acetonide 0.1% (KENALOG) 0.1 % ointment SMARTSI Topical Daily PRN    valACYclovir (VALTREX) 1000 MG tablet TAKE (1/2) HALF BY MOUTH EVERY 12 HOURS PRN    valsartan (DIOVAN) 320 mg, Oral, Daily     Objective:      Vitals:    24 0947   BP: 132/78   Pulse: (!) 117   SpO2: 99%   Weight: 77.2 kg (170 lb 3.1 oz)     Body mass index is 26.66 kg/m².    Physical Exam  Vitals reviewed.   Constitutional:       General: She is not in acute distress.     Appearance: Normal appearance. She is not ill-appearing or diaphoretic.   HENT:      Head: Normocephalic and atraumatic.      Right Ear: Tympanic membrane, ear canal and external ear normal. There is no impacted cerumen.      Left Ear: Tympanic membrane, ear canal and external ear normal. There is no impacted cerumen.      Nose: Nose normal. No rhinorrhea.      Mouth/Throat:      Mouth: Mucous membranes are moist.      Pharynx: Oropharynx is clear. No oropharyngeal exudate or posterior oropharyngeal erythema.   Eyes:      General: No scleral icterus.        Right eye: No discharge.         Left eye: No discharge.      Conjunctiva/sclera: Conjunctivae normal.   Neck:      Thyroid: No thyromegaly or thyroid tenderness.      Trachea: Trachea normal.   Cardiovascular:      Rate and Rhythm: Normal rate and regular rhythm.      Heart sounds: Normal heart  sounds. No murmur heard.     No friction rub. No gallop.   Pulmonary:      Effort: Pulmonary effort is normal. No respiratory distress.      Breath sounds: Normal breath sounds. No stridor. No wheezing, rhonchi or rales.   Abdominal:      General: There is no distension.      Palpations: Abdomen is soft.      Tenderness: There is no abdominal tenderness. There is no guarding or rebound.   Musculoskeletal:         General: No swelling or deformity.      Cervical back: Neck supple.   Lymphadenopathy:      Head:      Right side of head: No submandibular or posterior auricular adenopathy.      Left side of head: No submandibular or posterior auricular adenopathy.      Cervical: No cervical adenopathy.      Right cervical: No superficial, deep or posterior cervical adenopathy.     Left cervical: No superficial, deep or posterior cervical adenopathy.      Upper Body:      Right upper body: No supraclavicular adenopathy.      Left upper body: No supraclavicular adenopathy.   Skin:     General: Skin is warm and dry.   Neurological:      General: No focal deficit present.      Mental Status: She is alert. Mental status is at baseline.      Gait: Gait normal.   Psychiatric:         Mood and Affect: Mood normal.         Behavior: Behavior normal.         Assessment:       1. Health maintenance examination    2. Essential hypertension    3. Prediabetes    4. Aortic atherosclerosis    5. Hyperlipidemia, unspecified hyperlipidemia type    6. CLAY (generalized anxiety disorder)    7. Insomnia, unspecified type    8. Post-menopause on HRT (hormone replacement therapy)    9. Osteopenia of multiple sites    10. Asymptomatic menopausal state    11. Vitamin D deficiency    12. Vitamin B12 deficiency    13. Need for vaccination    14. Screening mammogram for breast cancer    15. Screening for colorectal cancer        Plan:       Essential hypertension  Increase valsartan to 320 mg daily  Check BP at home 3-4 times weekly, keep log for  review.   Contact office with home blood pressure logs in 2-3 weeks.   RTC in 6 months for follow up.  -     valsartan (DIOVAN) 320 MG tablet; Take 1 tablet (320 mg total) by mouth once daily.    Prediabetes  Continue healthy diet and lifestyle modifications  RTC in 6 months for follow up.    Hyperlipidemia, unspecified hyperlipidemia type  Aortic atherosclerosis   Continue current medications.  RTC in 6 months for follow up.  -     atorvastatin (LIPITOR) 80 MG tablet; Take 1 tablet (80 mg total) by mouth every evening.    CLAY (generalized anxiety disorder)  Insomnia, unspecified type  Continue current medications.  RTC in 6 months for follow up.    Post-menopause on HRT (hormone replacement therapy)  Continue medication, evaluation, and management per gynecologist.    Osteopenia of multiple sites  Asymptomatic menopausal state  Vitamin D deficiency  Continue supplementation.  RTC in 6 months for follow up.  -     DXA Bone Density Axial Skeleton 1 or more sites; Future    Vitamin B12 deficiency  Continue supplementation.  RTC in 6 months for follow up.    Health maintenance examination  Reviewed and discussed age appropriate screenings and immunizations.    Need for vaccination  Discussed recommended vaccinations and how to obtain.    Screening mammogram for breast cancer  -     Mammo Digital Screening Bilat w/ Saud; Future    Screening for colorectal cancer  -     Ambulatory referral/consult to Endo Procedure ; Future        Annamarie Edwards MD  8/22/2024

## 2024-08-26 ENCOUNTER — HOSPITAL ENCOUNTER (OUTPATIENT)
Dept: RADIOLOGY | Facility: HOSPITAL | Age: 64
Discharge: HOME | End: 2024-08-26
Attending: STUDENT IN AN ORGANIZED HEALTH CARE EDUCATION/TRAINING PROGRAM
Payer: MEDICARE

## 2024-08-26 DIAGNOSIS — Z12.31 SCREENING MAMMOGRAM FOR BREAST CANCER: ICD-10-CM

## 2024-08-26 PROCEDURE — 77063 BREAST TOMOSYNTHESIS BI: CPT | Mod: TC,HCNC,PO

## 2024-08-26 PROCEDURE — 77067 SCR MAMMO BI INCL CAD: CPT | Mod: TC,HCNC,PO

## 2024-08-26 NOTE — TELEPHONE ENCOUNTER
Refill Routing Note   Medication(s) are not appropriate for processing by [facility] for the following reason(s):        Outside of protocol    [facility] action(s):  Route               Appointments  past 12m or future 3m with PCP    Date Provider   Last Visit   8/22/2024 Annamarie Edwards MD   Next Visit   Visit date not found Annamarie Edwards MD   ED visits in past 90 days: 0        Note composed:5:05 PM 08/26/2024

## 2024-08-28 RX ORDER — MUPIROCIN 20 MG/G
OINTMENT TOPICAL
Qty: 22 G | Refills: 1 | Status: SHIPPED | OUTPATIENT
Start: 2024-08-28

## 2024-08-31 ENCOUNTER — PATIENT MESSAGE (OUTPATIENT)
Dept: INTERNAL MEDICINE | Facility: CLINIC | Age: 64
End: 2024-08-31
Payer: MEDICARE

## 2024-09-03 ENCOUNTER — PATIENT MESSAGE (OUTPATIENT)
Dept: INTERNAL MEDICINE | Facility: CLINIC | Age: 64
End: 2024-09-03
Payer: MEDICARE

## 2024-09-06 NOTE — TELEPHONE ENCOUNTER
Recommend scheduling another appointment so we can discuss this in more detail to determine how to evaluate/treat the concern, please assist with scheduling, thank you!

## 2024-09-12 ENCOUNTER — PATIENT MESSAGE (OUTPATIENT)
Dept: INTERNAL MEDICINE | Facility: CLINIC | Age: 64
End: 2024-09-12
Payer: MEDICARE

## 2024-10-03 DIAGNOSIS — Z78.0 MENOPAUSE: ICD-10-CM

## 2024-10-03 RX ORDER — PROGESTERONE 100 MG/1
CAPSULE ORAL
Qty: 90 CAPSULE | Refills: 0 | Status: SHIPPED | OUTPATIENT
Start: 2024-10-03

## 2024-10-03 NOTE — TELEPHONE ENCOUNTER
Refill Routing Note   Medication(s) are not appropriate for processing by [facility] for the following reason(s):        Patient not seen by provider within 15 months    [facility] action(s):  Defer               Appointments  past 12m or future 3m with PCP    Date Provider   Last Visit   Visit date not found Amelia Diana MD   Next Visit   Visit date not found Amelia Diana MD   ED visits in past 90 days: 0        Note composed:2:06 PM 10/03/2024

## 2024-10-15 DIAGNOSIS — F41.1 GAD (GENERALIZED ANXIETY DISORDER): ICD-10-CM

## 2024-10-15 DIAGNOSIS — B00.1 COLD SORE: ICD-10-CM

## 2024-10-15 RX ORDER — VALACYCLOVIR HYDROCHLORIDE 1 G/1
TABLET, FILM COATED ORAL
Qty: 30 TABLET | Refills: 1 | Status: SHIPPED | OUTPATIENT
Start: 2024-10-15

## 2024-10-15 RX ORDER — ESCITALOPRAM OXALATE 20 MG/1
30 TABLET ORAL DAILY
Qty: 135 TABLET | Refills: 3 | Status: SHIPPED | OUTPATIENT
Start: 2024-10-15

## 2024-10-15 NOTE — TELEPHONE ENCOUNTER
No care due was identified.  [facility] Embedded Care Due Messages. Reference number: [id number].   10/15/2024 1:18:14 PM CDT

## 2024-10-15 NOTE — TELEPHONE ENCOUNTER
Refill Decision Note   Odilia Winslow  is requesting a refill authorization.  Brief Assessment and Rationale for Refill:  Approve     Medication Therapy Plan:         Comments:     Note composed:5:38 PM 10/15/2024

## 2024-10-21 ENCOUNTER — PATIENT MESSAGE (OUTPATIENT)
Dept: OBSTETRICS AND GYNECOLOGY | Facility: CLINIC | Age: 64
End: 2024-10-21
Payer: MEDICARE

## 2024-10-22 ENCOUNTER — APPOINTMENT (OUTPATIENT)
Dept: RADIOLOGY | Facility: CLINIC | Age: 64
End: 2024-10-22
Attending: STUDENT IN AN ORGANIZED HEALTH CARE EDUCATION/TRAINING PROGRAM
Payer: MEDICARE

## 2024-10-22 DIAGNOSIS — M85.89 OSTEOPENIA OF MULTIPLE SITES: ICD-10-CM

## 2024-10-22 DIAGNOSIS — Z78.0 ASYMPTOMATIC MENOPAUSAL STATE: ICD-10-CM

## 2024-10-22 PROCEDURE — 77080 DXA BONE DENSITY AXIAL: CPT | Mod: TC,HCNC,PO

## 2024-10-22 PROCEDURE — 77080 DXA BONE DENSITY AXIAL: CPT | Mod: 26,HCNC,, | Performed by: INTERNAL MEDICINE

## 2024-10-22 RX ORDER — ACYCLOVIR 50 MG/G
CREAM TOPICAL DAILY
Qty: 5 G | Refills: 0 | Status: SHIPPED | OUTPATIENT
Start: 2024-10-22

## 2024-11-06 ENCOUNTER — TELEPHONE (OUTPATIENT)
Dept: INTERNAL MEDICINE | Facility: CLINIC | Age: 64
End: 2024-11-06
Payer: MEDICARE

## 2024-11-06 NOTE — TELEPHONE ENCOUNTER
----- Message from Annamarie Edwards MD sent at 11/5/2024  7:29 PM CST -----  Please assist patient with scheduling appointment to discuss bone density. Concern that she has progressed to osteoporosis and would warrant treatment. Thank you!

## 2024-11-06 NOTE — TELEPHONE ENCOUNTER
----- Message from Mauricio sent at 11/6/2024  9:55 AM CST -----  Type:  Patient Returning Call    Who Called:     Who Left Message for Patient:     Does the patient know what this is regarding?:  missed call     Best Call Back Number:   [phone]    Additional Information:

## 2024-11-11 ENCOUNTER — CLINICAL SUPPORT (OUTPATIENT)
Dept: ENDOSCOPY | Facility: HOSPITAL | Age: 64
End: 2024-11-11
Attending: STUDENT IN AN ORGANIZED HEALTH CARE EDUCATION/TRAINING PROGRAM
Payer: MEDICARE

## 2024-11-11 ENCOUNTER — TELEPHONE (OUTPATIENT)
Dept: ENDOSCOPY | Facility: HOSPITAL | Age: 64
End: 2024-11-11

## 2024-11-11 DIAGNOSIS — Z12.12 SCREENING FOR COLORECTAL CANCER: ICD-10-CM

## 2024-11-11 DIAGNOSIS — Z12.11 SCREENING FOR COLORECTAL CANCER: ICD-10-CM

## 2024-11-11 NOTE — TELEPHONE ENCOUNTER
Attempted to contact patient for PAT appointment to schedule colonoscopy. Left voicemail message and sent message via portal to call Endoscopy Scheduling at # [phone].

## 2024-11-11 NOTE — PLAN OF CARE
We attempted to reach you for your scheduled PAT appointment to schedule your colonoscopy. Left voicemail message. Please contact Endoscopy Scheduling at # [phone].

## 2024-11-20 ENCOUNTER — PATIENT MESSAGE (OUTPATIENT)
Dept: INTERNAL MEDICINE | Facility: CLINIC | Age: 64
End: 2024-11-20
Payer: MEDICARE

## 2024-11-20 DIAGNOSIS — I10 PRIMARY HYPERTENSION: Primary | ICD-10-CM

## 2024-11-21 ENCOUNTER — TELEPHONE (OUTPATIENT)
Dept: INTERNAL MEDICINE | Facility: CLINIC | Age: 64
End: 2024-11-21
Payer: MEDICARE

## 2024-11-21 VITALS — SYSTOLIC BLOOD PRESSURE: 159 MMHG | DIASTOLIC BLOOD PRESSURE: 95 MMHG

## 2024-11-21 RX ORDER — HYDROCHLOROTHIAZIDE 12.5 MG/1
12.5 TABLET ORAL DAILY
Qty: 30 TABLET | Refills: 11 | Status: SHIPPED | OUTPATIENT
Start: 2024-11-21 | End: 2025-11-21

## 2024-11-21 NOTE — TELEPHONE ENCOUNTER
Please let patient know I sent her HCTZ 12.5 mg to her pharmacy to start taking with the valsartan 320 mg. Can we schedule her for a nurse visit to recheck BP in 1 week? Thank you!

## 2024-12-10 ENCOUNTER — PATIENT MESSAGE (OUTPATIENT)
Dept: INTERNAL MEDICINE | Facility: CLINIC | Age: 64
End: 2024-12-10
Payer: MEDICARE

## 2024-12-10 DIAGNOSIS — I10 PRIMARY HYPERTENSION: Primary | ICD-10-CM

## 2024-12-23 ENCOUNTER — TELEPHONE (OUTPATIENT)
Dept: ENDOSCOPY | Facility: HOSPITAL | Age: 64
End: 2024-12-23

## 2024-12-26 ENCOUNTER — TELEPHONE (OUTPATIENT)
Dept: INTERNAL MEDICINE | Facility: CLINIC | Age: 64
End: 2024-12-26
Payer: MEDICARE

## 2024-12-26 ENCOUNTER — TELEPHONE (OUTPATIENT)
Dept: INTERNAL MEDICINE | Facility: CLINIC | Age: 64
End: 2024-12-26

## 2024-12-26 DIAGNOSIS — K22.2 ESOPHAGEAL STRICTURE: Primary | ICD-10-CM

## 2024-12-26 NOTE — TELEPHONE ENCOUNTER
----- Message from Taylor sent at 12/26/2024  2:31 PM CST -----  Regarding: Missed call  Type:  Patient Returning Call    Who Called:Odilia   Who Left Message for Patient:Brant Galicia   Does the patient know what this is regarding?:no   Would the patient rather a call back or a response via [facility]? Call back   Best Call Back Number:[phone]  Additional Information:

## 2024-12-26 NOTE — TELEPHONE ENCOUNTER
Spoke with patient and she states that she's not in a hurry to get the coloscopy done but would to get her Esophagus stretched again as she's been choking.      ----- Message from Nikki sent at 12/26/2024 11:17 AM CST -----  Pt is calling rg wanting to have her esophagus stretched before having her colonoscopy done/ pt can be reached at    [phone]//thanks/dbw

## 2024-12-26 NOTE — TELEPHONE ENCOUNTER
----- Message from Taylor sent at 12/26/2024  3:15 PM CST -----  Regarding: Missed call  Type:  Patient Returning Call    Who Called:Odilia   Who Left Message for Patient:Donna   Does the patient know what this is regarding?:No  Would the patient rather a call back or a response via [facility]? Call back   Best Call Back Number: [phone]  Additional Information:

## 2024-12-26 NOTE — TELEPHONE ENCOUNTER
----- Message from Nikki sent at 12/26/2024 11:17 AM CST -----  Pt is calling rg wanting to have her esophagus stretched before having her colonoscopy done/ pt can be reached at  [phone]//thanks/dbw

## 2024-12-26 NOTE — TELEPHONE ENCOUNTER
Left voicemail       Melonie Gilbert MA JW    12/26/24  2:20 PM  Note  ----- Message from Nikki sent at 12/26/2024 11:17 AM CST -----  Pt is calling rg wanting to have her esophagus stretched before having her colonoscopy done/ pt can be reached at    [phone]//davey/dbw

## 2025-01-02 ENCOUNTER — PATIENT MESSAGE (OUTPATIENT)
Dept: INTERNAL MEDICINE | Facility: CLINIC | Age: 65
End: 2025-01-02
Payer: MEDICARE

## 2025-02-09 ENCOUNTER — HOSPITAL ENCOUNTER (INPATIENT)
Facility: OTHER | Age: 65
LOS: 1 days | Discharge: HOME | DRG: 918 | End: 2025-02-11
Admitting: HOSPITALIST
Payer: MEDICARE

## 2025-02-09 DIAGNOSIS — T14.91XA SUICIDE ATTEMPT: Primary | ICD-10-CM

## 2025-02-09 DIAGNOSIS — T50.902A INTENTIONAL DRUG OVERDOSE: ICD-10-CM

## 2025-02-09 DIAGNOSIS — T50.901A OVERDOSE: ICD-10-CM

## 2025-02-09 DIAGNOSIS — R09.02 HYPOXIA: ICD-10-CM

## 2025-02-09 DIAGNOSIS — R94.31 PROLONGED Q-T INTERVAL ON ECG: ICD-10-CM

## 2025-02-09 LAB
ALBUMIN SERPL BCP-MCNC: 3.8 G/DL (ref 3.5–5.2)
ALP SERPL-CCNC: 79 U/L (ref 40–150)
ALT SERPL W/O P-5'-P-CCNC: 26 U/L (ref 10–44)
AMPHET+METHAMPHET UR QL: NEGATIVE
ANION GAP SERPL CALC-SCNC: 14 MMOL/L (ref 8–16)
APAP SERPL-MCNC: <3 UG/ML (ref 10–20)
AST SERPL-CCNC: 46 U/L (ref 10–40)
BASOPHILS # BLD AUTO: 0.04 K/UL (ref 0–0.2)
BASOPHILS NFR BLD: 0.6 % (ref 0–1.9)
BENZODIAZ UR QL SCN>200 NG/ML: NEGATIVE
BILIRUB SERPL-MCNC: 0.3 MG/DL (ref 0.1–1)
BILIRUB UR QL STRIP: NEGATIVE
BUN SERPL-MCNC: 9 MG/DL (ref 8–23)
BZE UR QL SCN: NEGATIVE
CALCIUM SERPL-MCNC: 9.7 MG/DL (ref 8.7–10.5)
CANNABINOIDS UR QL SCN: NEGATIVE
CHLORIDE SERPL-SCNC: 105 MMOL/L (ref 95–110)
CLARITY UR: CLEAR
CO2 SERPL-SCNC: 16 MMOL/L (ref 23–29)
COLOR UR: COLORLESS
CREAT SERPL-MCNC: 0.7 MG/DL (ref 0.5–1.4)
CREAT UR-MCNC: 12.2 MG/DL (ref 15–325)
DIFFERENTIAL METHOD BLD: ABNORMAL
EOSINOPHIL # BLD AUTO: 0.2 K/UL (ref 0–0.5)
EOSINOPHIL NFR BLD: 2.8 % (ref 0–8)
ERYTHROCYTE [DISTWIDTH] IN BLOOD BY AUTOMATED COUNT: 12.4 % (ref 11.5–14.5)
EST. GFR  (NO RACE VARIABLE): >60 ML/MIN/1.73 M^2
ETHANOL SERPL-MCNC: 144 MG/DL
GLUCOSE SERPL-MCNC: 100 MG/DL (ref 70–110)
GLUCOSE UR QL STRIP: NEGATIVE
HCT VFR BLD AUTO: 38.3 % (ref 37–48.5)
HCV AB SERPL QL IA: NEGATIVE
HGB BLD-MCNC: 12.9 G/DL (ref 12–16)
HGB UR QL STRIP: ABNORMAL
HIV 1+2 AB+HIV1 P24 AG SERPL QL IA: NEGATIVE
IMM GRANULOCYTES # BLD AUTO: 0.03 K/UL (ref 0–0.04)
IMM GRANULOCYTES NFR BLD AUTO: 0.5 % (ref 0–0.5)
KETONES UR QL STRIP: NEGATIVE
LEUKOCYTE ESTERASE UR QL STRIP: ABNORMAL
LYMPHOCYTES # BLD AUTO: 1.9 K/UL (ref 1–4.8)
LYMPHOCYTES NFR BLD: 29.1 % (ref 18–48)
MCH RBC QN AUTO: 31.9 PG (ref 27–31)
MCHC RBC AUTO-ENTMCNC: 33.7 G/DL (ref 32–36)
MCV RBC AUTO: 95 FL (ref 82–98)
METHADONE UR QL SCN>300 NG/ML: NEGATIVE
MICROSCOPIC COMMENT: NORMAL
MONOCYTES # BLD AUTO: 0.6 K/UL (ref 0.3–1)
MONOCYTES NFR BLD: 8.7 % (ref 4–15)
NEUTROPHILS # BLD AUTO: 3.8 K/UL (ref 1.8–7.7)
NEUTROPHILS NFR BLD: 58.3 % (ref 38–73)
NITRITE UR QL STRIP: NEGATIVE
NRBC BLD-RTO: 0 /100 WBC
OPIATES UR QL SCN: NEGATIVE
PCP UR QL SCN>25 NG/ML: NEGATIVE
PH UR STRIP: 6 [PH] (ref 5–8)
PLATELET # BLD AUTO: 250 K/UL (ref 150–450)
PMV BLD AUTO: 8.4 FL (ref 9.2–12.9)
POTASSIUM SERPL-SCNC: 3.4 MMOL/L (ref 3.5–5.1)
PROT SERPL-MCNC: 7.5 G/DL (ref 6–8.4)
PROT UR QL STRIP: NEGATIVE
RBC # BLD AUTO: 4.04 M/UL (ref 4–5.4)
RBC #/AREA URNS HPF: 1 /HPF (ref 0–4)
SODIUM SERPL-SCNC: 135 MMOL/L (ref 136–145)
SP GR UR STRIP: <1.005 (ref 1–1.03)
SQUAMOUS #/AREA URNS HPF: 0 /HPF
TOXICOLOGY INFORMATION: ABNORMAL
URN SPEC COLLECT METH UR: ABNORMAL
UROBILINOGEN UR STRIP-ACNC: NEGATIVE EU/DL
WBC # BLD AUTO: 6.52 K/UL (ref 3.9–12.7)
WBC #/AREA URNS HPF: 0 /HPF (ref 0–5)

## 2025-02-09 PROCEDURE — 85025 COMPLETE CBC W/AUTO DIFF WBC: CPT | Mod: HCNC | Performed by: NURSE PRACTITIONER

## 2025-02-09 PROCEDURE — 80053 COMPREHEN METABOLIC PANEL: CPT | Mod: HCNC | Performed by: NURSE PRACTITIONER

## 2025-02-09 PROCEDURE — 86803 HEPATITIS C AB TEST: CPT | Mod: HCNC | Performed by: NURSE PRACTITIONER

## 2025-02-09 PROCEDURE — 80307 DRUG TEST PRSMV CHEM ANLYZR: CPT | Mod: HCNC | Performed by: NURSE PRACTITIONER

## 2025-02-09 PROCEDURE — 87389 HIV-1 AG W/HIV-1&-2 AB AG IA: CPT | Mod: HCNC | Performed by: NURSE PRACTITIONER

## 2025-02-09 PROCEDURE — 99285 EMERGENCY DEPT VISIT HI MDM: CPT | Mod: 25,HCNC

## 2025-02-09 PROCEDURE — G0378 HOSPITAL OBSERVATION PER HR: HCPCS | Mod: HCNC

## 2025-02-09 PROCEDURE — 80143 DRUG ASSAY ACETAMINOPHEN: CPT | Mod: HCNC | Performed by: NURSE PRACTITIONER

## 2025-02-09 PROCEDURE — 93005 ELECTROCARDIOGRAM TRACING: CPT | Mod: HCNC

## 2025-02-09 PROCEDURE — 81000 URINALYSIS NONAUTO W/SCOPE: CPT | Mod: HCNC,59 | Performed by: NURSE PRACTITIONER

## 2025-02-09 PROCEDURE — 63600175 PHARM REV CODE 636 W HCPCS: Mod: HCNC | Performed by: NURSE PRACTITIONER

## 2025-02-09 PROCEDURE — 25000003 PHARM REV CODE 250: Mod: HCNC | Performed by: NURSE PRACTITIONER

## 2025-02-09 PROCEDURE — 82077 ASSAY SPEC XCP UR&BREATH IA: CPT | Mod: HCNC | Performed by: NURSE PRACTITIONER

## 2025-02-09 RX ORDER — VALSARTAN 80 MG/1
320 TABLET ORAL DAILY
Status: DISCONTINUED | OUTPATIENT
Start: 2025-02-10 | End: 2025-02-10

## 2025-02-09 RX ORDER — SODIUM CHLORIDE, SODIUM LACTATE, POTASSIUM CHLORIDE, CALCIUM CHLORIDE 600; 310; 30; 20 MG/100ML; MG/100ML; MG/100ML; MG/100ML
INJECTION, SOLUTION INTRAVENOUS CONTINUOUS
Status: DISCONTINUED | OUTPATIENT
Start: 2025-02-09 | End: 2025-02-11

## 2025-02-09 RX ORDER — HEPARIN SODIUM 5000 [USP'U]/ML
5000 INJECTION, SOLUTION INTRAVENOUS; SUBCUTANEOUS EVERY 8 HOURS
Status: DISCONTINUED | OUTPATIENT
Start: 2025-02-10 | End: 2025-02-11 | Stop reason: HOSPADM

## 2025-02-09 RX ORDER — SODIUM CHLORIDE 0.9 % (FLUSH) 0.9 %
10 SYRINGE (ML) INJECTION EVERY 8 HOURS PRN
Status: DISCONTINUED | OUTPATIENT
Start: 2025-02-09 | End: 2025-02-11 | Stop reason: HOSPADM

## 2025-02-09 RX ORDER — ELECTROLYTES/DEXTROSE
400 SOLUTION, ORAL ORAL
Status: DISCONTINUED | OUTPATIENT
Start: 2025-02-09 | End: 2025-02-09

## 2025-02-09 RX ORDER — GLUCAGON 1 MG
1 KIT INJECTION
Status: DISCONTINUED | OUTPATIENT
Start: 2025-02-09 | End: 2025-02-11 | Stop reason: HOSPADM

## 2025-02-09 RX ORDER — IBUPROFEN 200 MG
24 TABLET ORAL
Status: DISCONTINUED | OUTPATIENT
Start: 2025-02-09 | End: 2025-02-11 | Stop reason: HOSPADM

## 2025-02-09 RX ORDER — NALOXONE HCL 0.4 MG/ML
0.02 VIAL (ML) INJECTION
Status: DISCONTINUED | OUTPATIENT
Start: 2025-02-09 | End: 2025-02-11 | Stop reason: HOSPADM

## 2025-02-09 RX ORDER — IBUPROFEN 200 MG
16 TABLET ORAL
Status: DISCONTINUED | OUTPATIENT
Start: 2025-02-09 | End: 2025-02-11 | Stop reason: HOSPADM

## 2025-02-09 RX ADMIN — SODIUM CHLORIDE, POTASSIUM CHLORIDE, SODIUM LACTATE AND CALCIUM CHLORIDE: 600; 310; 30; 20 INJECTION, SOLUTION INTRAVENOUS at 11:02

## 2025-02-09 RX ADMIN — Medication 400 ML: at 10:02

## 2025-02-10 PROBLEM — T14.91XA SUICIDE ATTEMPT: Status: ACTIVE | Noted: 2025-02-10

## 2025-02-10 LAB
ALBUMIN SERPL BCP-MCNC: 3.3 G/DL (ref 3.5–5.2)
ALP SERPL-CCNC: 63 U/L (ref 40–150)
ALT SERPL W/O P-5'-P-CCNC: 23 U/L (ref 10–44)
ANION GAP SERPL CALC-SCNC: 10 MMOL/L (ref 8–16)
AST SERPL-CCNC: 32 U/L (ref 10–40)
BASOPHILS # BLD AUTO: 0.03 K/UL (ref 0–0.2)
BASOPHILS NFR BLD: 0.6 % (ref 0–1.9)
BILIRUB SERPL-MCNC: 0.4 MG/DL (ref 0.1–1)
BUN SERPL-MCNC: 10 MG/DL (ref 8–23)
CALCIUM SERPL-MCNC: 9.3 MG/DL (ref 8.7–10.5)
CHLORIDE SERPL-SCNC: 110 MMOL/L (ref 95–110)
CO2 SERPL-SCNC: 22 MMOL/L (ref 23–29)
CREAT SERPL-MCNC: 0.7 MG/DL (ref 0.5–1.4)
DIFFERENTIAL METHOD BLD: ABNORMAL
EOSINOPHIL # BLD AUTO: 0.2 K/UL (ref 0–0.5)
EOSINOPHIL NFR BLD: 3.5 % (ref 0–8)
ERYTHROCYTE [DISTWIDTH] IN BLOOD BY AUTOMATED COUNT: 12.7 % (ref 11.5–14.5)
EST. GFR  (NO RACE VARIABLE): >60 ML/MIN/1.73 M^2
GLUCOSE SERPL-MCNC: 106 MG/DL (ref 70–110)
HCT VFR BLD AUTO: 35.9 % (ref 37–48.5)
HGB BLD-MCNC: 12 G/DL (ref 12–16)
IMM GRANULOCYTES # BLD AUTO: 0.02 K/UL (ref 0–0.04)
IMM GRANULOCYTES NFR BLD AUTO: 0.4 % (ref 0–0.5)
LYMPHOCYTES # BLD AUTO: 1.8 K/UL (ref 1–4.8)
LYMPHOCYTES NFR BLD: 35.3 % (ref 18–48)
MAGNESIUM SERPL-MCNC: 1.8 MG/DL (ref 1.6–2.6)
MCH RBC QN AUTO: 32.1 PG (ref 27–31)
MCHC RBC AUTO-ENTMCNC: 33.4 G/DL (ref 32–36)
MCV RBC AUTO: 96 FL (ref 82–98)
MONOCYTES # BLD AUTO: 0.5 K/UL (ref 0.3–1)
MONOCYTES NFR BLD: 9.5 % (ref 4–15)
NEUTROPHILS # BLD AUTO: 2.6 K/UL (ref 1.8–7.7)
NEUTROPHILS NFR BLD: 50.7 % (ref 38–73)
NRBC BLD-RTO: 0 /100 WBC
OHS QRS DURATION: 84 MS
OHS QRS DURATION: 84 MS
OHS QTC CALCULATION: 457 MS
OHS QTC CALCULATION: 487 MS
PHOSPHATE SERPL-MCNC: 4.1 MG/DL (ref 2.7–4.5)
PLATELET # BLD AUTO: 223 K/UL (ref 150–450)
PMV BLD AUTO: 8.8 FL (ref 9.2–12.9)
POTASSIUM SERPL-SCNC: 4.2 MMOL/L (ref 3.5–5.1)
PROT SERPL-MCNC: 6.3 G/DL (ref 6–8.4)
RBC # BLD AUTO: 3.74 M/UL (ref 4–5.4)
SODIUM SERPL-SCNC: 142 MMOL/L (ref 136–145)
WBC # BLD AUTO: 5.16 K/UL (ref 3.9–12.7)

## 2025-02-10 PROCEDURE — 93005 ELECTROCARDIOGRAM TRACING: CPT | Mod: HCNC

## 2025-02-10 PROCEDURE — 21400001 HC TELEMETRY ROOM: Mod: HCNC

## 2025-02-10 PROCEDURE — 85025 COMPLETE CBC W/AUTO DIFF WBC: CPT | Mod: HCNC | Performed by: NURSE PRACTITIONER

## 2025-02-10 PROCEDURE — 93010 ELECTROCARDIOGRAM REPORT: CPT | Mod: 76,HCNC,, | Performed by: INTERNAL MEDICINE

## 2025-02-10 PROCEDURE — 99900035 HC TECH TIME PER 15 MIN (STAT): Mod: HCNC

## 2025-02-10 PROCEDURE — 36415 COLL VENOUS BLD VENIPUNCTURE: CPT | Mod: HCNC | Performed by: NURSE PRACTITIONER

## 2025-02-10 PROCEDURE — 84100 ASSAY OF PHOSPHORUS: CPT | Mod: HCNC | Performed by: NURSE PRACTITIONER

## 2025-02-10 PROCEDURE — 25000003 PHARM REV CODE 250: Mod: HCNC | Performed by: HOSPITALIST

## 2025-02-10 PROCEDURE — 83735 ASSAY OF MAGNESIUM: CPT | Mod: HCNC | Performed by: NURSE PRACTITIONER

## 2025-02-10 PROCEDURE — 63600175 PHARM REV CODE 636 W HCPCS: Mod: HCNC | Performed by: NURSE PRACTITIONER

## 2025-02-10 PROCEDURE — 80053 COMPREHEN METABOLIC PANEL: CPT | Mod: HCNC | Performed by: NURSE PRACTITIONER

## 2025-02-10 PROCEDURE — 96372 THER/PROPH/DIAG INJ SC/IM: CPT | Performed by: NURSE PRACTITIONER

## 2025-02-10 RX ORDER — VALSARTAN 40 MG/1
40 TABLET ORAL DAILY
Status: DISCONTINUED | OUTPATIENT
Start: 2025-02-10 | End: 2025-02-11 | Stop reason: HOSPADM

## 2025-02-10 RX ORDER — POTASSIUM CHLORIDE 20 MEQ/1
40 TABLET, EXTENDED RELEASE ORAL ONCE
Status: COMPLETED | OUTPATIENT
Start: 2025-02-10 | End: 2025-02-10

## 2025-02-10 RX ADMIN — SODIUM CHLORIDE, POTASSIUM CHLORIDE, SODIUM LACTATE AND CALCIUM CHLORIDE: 600; 310; 30; 20 INJECTION, SOLUTION INTRAVENOUS at 08:02

## 2025-02-10 RX ADMIN — HEPARIN SODIUM 5000 UNITS: 5000 INJECTION, SOLUTION INTRAVENOUS; SUBCUTANEOUS at 05:02

## 2025-02-10 RX ADMIN — HEPARIN SODIUM 5000 UNITS: 5000 INJECTION, SOLUTION INTRAVENOUS; SUBCUTANEOUS at 10:02

## 2025-02-10 RX ADMIN — VALSARTAN 40 MG: 40 TABLET, FILM COATED ORAL at 08:02

## 2025-02-10 RX ADMIN — POTASSIUM CHLORIDE 40 MEQ: 1500 TABLET, EXTENDED RELEASE ORAL at 06:02

## 2025-02-10 NOTE — PLAN OF CARE
Problem: Adult Inpatient Plan of Care  Goal: Plan of Care Review  Outcome: Progressing  Goal: Patient-Specific Goal (Individualized)  Outcome: Progressing  Goal: Absence of Hospital-Acquired Illness or Injury  Outcome: Progressing  Goal: Optimal Comfort and Wellbeing  Outcome: Progressing  Goal: Readiness for Transition of Care  Outcome: Progressing     Plan of care reviewed and followed. Patient progressing well. Tolerated medications well. Sitter remains @ the bedside per [facility] policy. Observed to be sad and remorseful. Absence of falls or injury noted throughout shift. Instructed to call for mobility assistance. Call bell in reach, side rails up x2, bed in lowest position. VSS and NADN. Compliant with scheduled medications. POC ongoing.

## 2025-02-10 NOTE — PLAN OF CARE
Pt lying in bed. Assessment completed; no pain or distress reported. Call light within reach and bed lowered.     1700  VSS on RA and afebrile this shift.Good appetite and good UOP this shift. Repositions self independently in bed and ambulating around room. PEC'd documentation and rounding completed. Free from injury or skin breakdown. Fall precautions maintained and call light in reach. POC updated questions answered and comments acknowledged.  Purposeful hourly rounding completed this shift.

## 2025-02-10 NOTE — ED NOTES
Poison Control contacted. Spoke with Dalila.     Monitor Vitals, Routine labs, Drug screen, Tylenol level, EKG, Cardiac monitoring.     Monitor for s/s of Agitation, sedation, QTC prolongation.     If pt displays s/s of Agitation, treat with IV fluids and Benzodiazepines.

## 2025-02-10 NOTE — ASSESSMENT & PLAN NOTE
Patient's blood pressure range in the last 24 hours was: BP  Min: 124/67  Max: 157/70.The patient's inpatient anti-hypertensive regimen is listed below:  Current Antihypertensives  valsartan tablet 320 mg, Daily, Oral    Plan  - BP is controlled, no changes needed to their regimen

## 2025-02-10 NOTE — SUBJECTIVE & OBJECTIVE
Interval History:  Patient known to me as her  has been admitted here multiple times.  She is familiar with inpatient psych facilities in the area due to her son having been hospitalized.   at bedside in the afternoon, feels patient would be safe with him and her sons at home, they plan to be present 24 hours/day.  Patient says she wishes she had not taken the overdose and would not do it again.     Review of Systems   Constitutional:  Negative for chills and fever.   Respiratory:  Negative for cough and shortness of breath.    Cardiovascular:  Negative for chest pain and palpitations.   Psychiatric/Behavioral:  Positive for dysphoric mood.      Objective:     Vital Signs (Most Recent):  Temp: 98.4 °F (36.9 °C) (02/10/25 1421)  Pulse: 98 (02/10/25 1421)  Resp: 17 (02/10/25 1421)  BP: (!) 155/70 (02/10/25 1421)  SpO2: (!) 93 % (02/10/25 1421) Vital Signs (24h Range):  Temp:  [97.4 °F (36.3 °C)-98.4 °F (36.9 °C)] 98.4 °F (36.9 °C)  Pulse:  [79-98] 98  Resp:  [16-20] 17  SpO2:  [93 %-100 %] 93 %  BP: ()/(50-79) 155/70     Weight: 77.2 kg (170 lb 3.1 oz)  Body mass index is 26.66 kg/m².    Intake/Output Summary (Last 24 hours) at 2/10/2025 1500  Last data filed at 2/10/2025 1020  Gross per 24 hour   Intake 520 ml   Output 2400 ml   Net -1880 ml         Physical Exam  Cardiovascular:      Rate and Rhythm: Normal rate and regular rhythm.      Heart sounds: Normal heart sounds. No murmur heard.     No gallop.   Pulmonary:      Effort: Pulmonary effort is normal.      Breath sounds: Normal breath sounds.   Abdominal:      General: Bowel sounds are normal.      Palpations: Abdomen is soft.   Skin:     General: Skin is warm and dry.   Neurological:      General: No focal deficit present.      Mental Status: She is alert.   Psychiatric:         Mood and Affect: Mood normal.         Behavior: Behavior normal.             Significant Labs: All pertinent labs within the past 24 hours have been  reviewed.    Significant Imaging: I have reviewed all pertinent imaging results/findings within the past 24 hours.

## 2025-02-10 NOTE — PLAN OF CARE
Case Management Assessment     PCP: Correct in [facility]   Pharmacy: Bedside     Patient Arrived From: Home   Existing Help at Home: Family     Barriers to Discharge: None     Discharge Plan:    A. Home with family    B. Home with family     [facility] ([address])  Initial Discharge Assessment       Primary Care Provider: Annamarie Edwards MD    Admission Diagnosis: Overdose [T50.901A]  Suicide attempt [T14.91XA]  Hypoxia [R09.02]    Admission Date: 2/9/2025  Expected Discharge Date: 2/11/2025    Transition of Care Barriers: (P) None    Payor: HU[employer] MANAGED MEDICARE / Plan: [employer] MEDICARE HMO / Product Type: Capitation /     Extended Emergency Contact Information  Primary Emergency Contact: HuseyinGala mendiola  Mobile Phone: [phone]  Relation: Daughter  Secondary Emergency Contact: Juan Carlos Winslowinic  Address: [address] [facility]           [place] [address] [facility]  Home Phone: [phone]  Mobile Phone: [phone]  Relation: Spouse    Discharge Plan A: (P) Home with family  Discharge Plan B: (P) Home Health, Home with family      [facility] - [place] - [address] [place] [address]  [address] [place] [address]  [place] [address]  Phone: [phone] Fax: [phone]    [id number] [place], [place] - [address] [facility] [address]  [address] [facility] [address]  Phone: [phone] Fax: [phone]      Initial Assessment (most recent)       Adult Discharge Assessment - 02/10/25 1538          Discharge Assessment    Assessment Type Discharge Planning Assessment (P)      Confirmed/corrected address, phone number and insurance Yes (P)      Confirmed Demographics Correct on Facesheet (P)      Source of Information patient (P)      People in Home spouse;child(melania), adult (P)      Do you expect to return to your current living situation? Yes (P)      Do you have help at home or someone to help you manage your care at  home? Yes (P)      Prior to hospitilization cognitive status: Alert/Oriented (P)      Current cognitive status: Alert/Oriented (P)      Walking or Climbing Stairs Difficulty no (P)      Dressing/Bathing Difficulty no (P)      Equipment Currently Used at Home none (P)      Readmission within 30 days? No (P)      Patient currently being followed by outpatient case management? No (P)      Do you currently have service(s) that help you manage your care at home? No (P)      Do you take prescription medications? Yes (P)      Do you have prescription coverage? Yes (P)      Do you have any problems affording any of your prescribed medications? No (P)      Is the patient taking medications as prescribed? yes (P)      How do you get to doctors appointments? car, drives self;family or friend will provide (P)      Are you on dialysis? No (P)      Do you take coumadin? No (P)      Discharge Plan A Home with family (P)      Discharge Plan B Home Health;Home with family (P)      DME Needed Upon Discharge  none (P)      Discharge Plan discussed with: Patient (P)      Transition of Care Barriers None (P)

## 2025-02-10 NOTE — HPI
HPI by Ariel Ye NP:  The patient is a 65 y/o female with anxiety, HLD, IBS, and esophageal stricture who presents with depression and attempted suicide by taking 12 trazodone 50 mg pills PTA. Pt states she takes Lexapro for her depression. She has had a very hard month. She has 2 mentally disabled sons, her  has cancer, they don't have enough money to pay their bills, and her daughter just went through a depressive episode.  She will be admitted for further management of her drug overdose and Tele-Psych consult.

## 2025-02-10 NOTE — ASSESSMENT & PLAN NOTE
Patient took trazodone.  Mildly prolonged QT interval.  AAOx3    Consult Tele-Psych  IVF  Encourage oral fluids  On PEC

## 2025-02-10 NOTE — ED PROVIDER NOTES
"Encounter Date: 2/9/2025       History     Chief Complaint   Patient presents with    Suicidal     Feeling extremely depressed over the past week  Reports SI w/ attempt and took 10 trazodone approximately one hour ago  "I just been extremely depressed and overwhelmed."  "I took the medicine bc I wanted to go to sleep and not wake up."      Denies any HI at this time  Admits to drinking a few beers earlier today      63 y/o female with anxiety, GERD, HLD, insomnia, IBS, and esophageal stricture which presents with depression and attempted suicide by taking 12 trazodone 50 mg  pills PTA. Pt states she takes Lexapro for her depression. She has had a very hard month. She has 2 mentally disabled sons, her  has cancer, they don't have enough money to pay their bills, and her daughter just went through a depressive episode.     The history is provided by the patient.     Review of patient's allergies indicates:  No Known Allergies  Past Medical History:   Diagnosis Date    Anxiety     Esophageal stricture     GERD (gastroesophageal reflux disease)     H/O cold sores     Hyperlipidemia     Insomnia     Irritable bowel syndrome     Postmenopausal bleeding      Past Surgical History:   Procedure Laterality Date    DILATION AND CURETTAGE OF UTERUS  2007, 2013    ECTOPIC PREGNANCY SURGERY  1986    removal of portion of tube    gerd      KNEE SURGERY Right 2015    arthroscopy    TONSILLECTOMY       Family History   Problem Relation Name Age of Onset    Heart disease Mother Chandana         cad s/p stent    Hypertension Mother Tonry     Cancer Mother Tonry         MDS    Mental illness Mother Tonry     Diabetes Father Hug     Cancer Father Hug         lung, + tobacco    Breast cancer Maternal Aunt      No Known Problems Sister      Cancer Brother Jose         lung, + tobacco    Thyroid disease Daughter      Bipolar disorder Son Matty and Herve     Mental illness Son Matty and Herve     No Known Problems Sister      No Known " Problems Brother      No Known Problems Son      Mental illness Sister Betsey     Colon cancer Neg Hx      Ovarian cancer Neg Hx       Social History     Tobacco Use    Smoking status: Never    Smokeless tobacco: Never   Substance Use Topics    Alcohol use: Yes     Comment: occasional    Drug use: No     Review of Systems   Constitutional:  Negative for fever.   HENT:  Negative for sore throat.    Respiratory:  Negative for shortness of breath.    Cardiovascular:  Negative for chest pain.   Gastrointestinal:  Negative for nausea.   Genitourinary:  Negative for dysuria.   Musculoskeletal:  Negative for back pain.   Skin:  Negative for rash.   Neurological:  Negative for weakness.   Hematological:  Does not bruise/bleed easily.   Psychiatric/Behavioral:  Positive for self-injury and suicidal ideas.    All other systems reviewed and are negative.      Physical Exam     Initial Vitals [02/09/25 1913]   BP Pulse Resp Temp SpO2   (!) 149/71 85 17 97.6 °F (36.4 °C) 95 %      MAP       --         Physical Exam    Nursing note and vitals reviewed.  Constitutional: She appears well-developed and well-nourished.   HENT:   Head: Normocephalic and atraumatic.   Eyes: Conjunctivae and EOM are normal. Pupils are equal, round, and reactive to light.   Neck:   Normal range of motion.  Cardiovascular:  Normal rate, regular rhythm, normal heart sounds and intact distal pulses.     Exam reveals no gallop and no friction rub.       No murmur heard.  Pulmonary/Chest: Breath sounds normal. No respiratory distress. She has no wheezes. She has no rhonchi. She has no rales. She exhibits no tenderness.   Abdominal: Abdomen is soft. Bowel sounds are normal. She exhibits no distension and no mass. There is no abdominal tenderness. There is no rebound and no guarding.   Musculoskeletal:         General: No tenderness or edema. Normal range of motion.      Cervical back: Normal range of motion.     Neurological: She is alert and oriented to  person, place, and time. She has normal strength. GCS score is 15. GCS eye subscore is 4. GCS verbal subscore is 5. GCS motor subscore is 6.   Skin: Skin is warm. Capillary refill takes less than 2 seconds. No rash noted. No erythema.   Psychiatric: She exhibits a depressed mood.       ED Course   Procedures  Labs Reviewed   CBC W/ AUTO DIFFERENTIAL - Abnormal       Result Value    WBC 6.52      RBC 4.04      Hemoglobin 12.9      Hematocrit 38.3      MCV 95      MCH 31.9 (*)     MCHC 33.7      RDW 12.4      Platelets 250      MPV 8.4 (*)     Immature Granulocytes 0.5      Gran # (ANC) 3.8      Immature Grans (Abs) 0.03      Lymph # 1.9      Mono # 0.6      Eos # 0.2      Baso # 0.04      nRBC 0      Gran % 58.3      Lymph % 29.1      Mono % 8.7      Eosinophil % 2.8      Basophil % 0.6      Differential Method Automated     COMPREHENSIVE METABOLIC PANEL - Abnormal    Sodium 135 (*)     Potassium 3.4 (*)     Chloride 105      CO2 16 (*)     Glucose 100      BUN 9      Creatinine 0.7      Calcium 9.7      Total Protein 7.5      Albumin 3.8      Total Bilirubin 0.3      Alkaline Phosphatase 79      AST 46 (*)     ALT 26      eGFR >60      Anion Gap 14     URINALYSIS, REFLEX TO URINE CULTURE - Abnormal    Specimen UA Urine, Clean Catch      Color, UA Colorless (*)     Appearance, UA Clear      pH, UA 6.0      Specific Gravity, UA <1.005 (*)     Protein, UA Negative      Glucose, UA Negative      Ketones, UA Negative      Bilirubin (UA) Negative      Occult Blood UA Trace (*)     Nitrite, UA Negative      Urobilinogen, UA Negative      Leukocytes, UA Trace (*)     Narrative:     Specimen Source->Urine   DRUG SCREEN PANEL, URINE EMERGENCY - Abnormal    Benzodiazepines Negative      Methadone metabolites Negative      Cocaine (Metab.) Negative      Opiate Scrn, Ur Negative      Amphetamine Screen, Ur Negative      THC Negative      Phencyclidine Negative      Creatinine, Urine 12.2 (*)     Toxicology Information SEE  COMMENT      Narrative:     Specimen Source->Urine   ALCOHOL,MEDICAL (ETHANOL) - Abnormal    Alcohol, Serum 144 (*)    ACETAMINOPHEN LEVEL - Abnormal    Acetaminophen (Tylenol), Serum <3.0 (*)    HEPATITIS C ANTIBODY   HIV 1 / 2 ANTIBODY   HIV 1 / 2 ANTIBODY    HIV 1/2 Ag/Ab Negative     HEPATITIS C ANTIBODY    Hepatitis C Ab Negative     URINALYSIS MICROSCOPIC    RBC, UA 1      WBC, UA 0      Squam Epithel, UA 0      Microscopic Comment SEE COMMENT      Narrative:     Specimen Source->Urine     EKG Readings: (Independently Interpreted)   Initial Reading: No STEMI. Previous EKG: Compared with most recent EKG Previous EKG Date: 06/17/2023. Rhythm: Normal Sinus Rhythm. Heart Rate: 78. Ectopy: No Ectopy. Conduction: Normal. ST Segments: Normal ST Segments. T Waves: Normal. Clinical Impression: Normal Sinus Rhythm   QT is now 428 compared to 384 in June of 2023       Imaging Results    None          Medications   electrolytes-dextrose (Pedialyte) oral solution 400 mL (has no administration in time range)     Medical Decision Making  63 y/o female which presents to the ED after an attempted suicide with trazodone. PEC completed. Poison control states the patient has to be monitored for at least 8 hours since she became hypoxic and slurring speech due to trazodone. Labs are unremarkable. EKG shows slightly longer QT compared to previous EKG on June of 2023. Pt will be admitted to Dr. Wilde for monitoring due to her hypoxia and drowsiness. Pt currently on 2 liters. Pt updated on plan.       Differential Diagnosis: suicidal attempt, overdose, depression, hypoxia, prolonged QT    Problems Addressed:  Hypoxia: acute illness or injury  Overdose: acute illness or injury  Suicide attempt: acute illness or injury    Amount and/or Complexity of Data Reviewed  Labs: ordered. Decision-making details documented in ED Course.    Risk  OTC drugs.  Decision regarding hospitalization.               ED Course as of 02/09/25 2103   Sun  Feb 09, 2025 1918 BP(!): 149/71 [AT]   1918 MAP (mmHg): 102 [AT]   1918 Temp: 97.6 °F (36.4 °C) [AT]   1918 Temp Source: Oral [AT]   1918 Pulse: 85 [AT]   1918 Resp: 17 [AT]   1918 SpO2: 95 % [AT]   1928 Poison control called regarding trazodone attempted overdose. They would like the patient monitored for sedation, agitation, and QTC prolongation. They also requested a drug screen, acetaminophen level, EKG, and routine labs.  [AT]   1956 Leukocyte Esterase, UA(!): Trace [AT]   1956 Blood, UA(!): Trace [AT]   1956 Spec Grav UA(!): <1.005 [AT]      ED Course User Index  [AT] Cornelia Fernandez FNP                           Clinical Impression:  Final diagnoses:  [T50.901A] Overdose  [T14.91XA] Suicide attempt (Primary)  [R09.02] Hypoxia          ED Disposition Condition    Observation Stable                  Cornelia Fernandez FNP  02/09/25 2103       Cornelia Fernandez FNP  02/09/25 2103

## 2025-02-10 NOTE — CONSULTS
"  The patient location is  [facility], [place], operated by [facility]*     Consults  Consult Start Time: 02/10/2025 12:40 CST  Consult End Time: 02/10/2025 14:00 CST          Telepsychiatry Consult    The chief complaint leading to psychiatric consultation is: psychiatric evaluation  This consultation is from the patient's treating physician Dr. Idalmis Wilde  Start time of consultation 12:40 pm     Patient Identification:  Odilia Winslow is a 64 y.o. female.    Patient information was obtained from patient.    History of Present Illness:    From current presentation:  "  Patient presents with    Suicidal       Feeling extremely depressed over the past week  Reports SI w/ attempt and took 10 trazodone approximately one hour ago  "I just been extremely depressed and overwhelmed."  "I took the medicine bc I wanted to go to sleep and not wake up."        Denies any HI at this time  Admits to drinking a few beers earlier today    HPI: The patient is a 63 y/o female with anxiety, HLD, IBS, and esophageal stricture who presents with depression and attempted suicide by taking 12 trazodone 50 mg pills PTA. Pt states she takes Lexapro for her depression. She has had a very hard month. She has 2 mentally disabled sons, her  has cancer, they don't have enough money to pay their bills, and her daughter just went through a depressive episode.  She will be admitted for further management of her drug overdose and Tele-Psych consult."    On interview by me today:  Much stress.  has medical issues, is unable to work; 2 sons have psychiatric problems; daughter has medical problem; financial stress; yesterday drank some beer; took about 10 tablets of Trazodone in suicide attempt, 10 min later told ;  Has been taking Lexapro 30 mg daily[at this dose about 6 months]; rarely takes Trazodone, because it causes grogginess in the morning;  Currently denies SI/HI.    Gala Winslow  Daughter  [phone]    Bulmaro " Goldy  Spouse  [phone]: patient appeared fine mentally until yesterday; patient told  that she took 10 sleeping pills;  was shocked about overdose     Current Outpatient Medications on File Prior to Encounter   Medication Sig Dispense Refill Last Dose/Taking    atorvastatin (LIPITOR) 80 MG tablet Take 1 tablet (80 mg total) by mouth every evening. 90 tablet 3 2025    CALCIUM ORAL Take by mouth.   2025    cholecalciferol, vitamin D3, (VITAMIN D3) 2,000 unit Cap Take 1 capsule (2,000 Units total) by mouth once daily.   2025    clobetasol 0.05% (TEMOVATE) 0.05 % Oint AAA bid avoid face/groin 60 g 3 2/10/2025 Morning    EScitalopram oxalate (LEXAPRO) 20 MG tablet TAKE 1.5 TABLETS (30 MG TOTAL) BY MOUTH ONCE DAILY. 135 tablet 3 2025    hydroCHLOROthiazide (HYDRODIURIL) 12.5 MG Tab Take 1 tablet (12.5 mg total) by mouth once daily. 30 tablet 11 2025    hydrOXYzine HCL (ATARAX) 10 MG Tab TAKE 1 TABLET (10 MG TOTAL) BY MOUTH 3 (THREE) TIMES DAILY AS NEEDED (ANXIETY). 60 tablet 3 Past Month    progesterone (PROMETRIUM) 100 MG capsule TAKE ONE BY MOUTH NIGHTLY 90 capsule 0 2025    traZODone (DESYREL) 50 MG tablet Take 1 tablet (50 mg total) by mouth every evening. prn 90 tablet 1 2025    valACYclovir (VALTREX) 1000 MG tablet TAKE (1/2) HALF BY MOUTH EVERY 12 HOURS PRN 30 tablet 1 Past Month    valsartan (DIOVAN) 320 MG tablet Take 1 tablet (320 mg total) by mouth once daily. 90 tablet 3 2025    acyclovir 5% (ZOVIRAX) 5 % Crea Apply topically Daily. 5 g 0 More than a month    diphth,pertus,acell,,tetanus (BOOSTRIX TDAP) 2.5-8-5 Lf-mcg-Lf/0.5mL Syrg injection Inject into the muscle. (Patient not taking: Reported on 2024) 0.5 mL 0     mupirocin (BACTROBAN) 2 % ointment APPLY TOPICALLY TWICE DAILY TO NARES. USE CLEAN SWAB EACH NARE, EACH TIME, FOR 7 DAYS 22 g 1     triamcinolone acetonide 0.1% (KENALOG) 0.1 % ointment SMARTSI Topical Daily PRN (Patient not  taking: Reported on 5/29/2024)   More than a month        Psychiatric History:   Hospitalization: denies  Medication Trials: Lexapro, Trazodone, Hydroxyzine  Suicide Attempts: denies  Violence: denies  Depression: yes  Jazzy: denies  AH's: denies  Delusions: denies    Past Medical History:   Past Medical History:   Diagnosis Date    Anxiety     Esophageal stricture     GERD (gastroesophageal reflux disease)     H/O cold sores     Hyperlipidemia     Insomnia     Irritable bowel syndrome     Postmenopausal bleeding      Allergies: Review of patient's allergies indicates:  No Known Allergies  nkda    Medications:  Scheduled Meds:    heparin (porcine)  5,000 Units Subcutaneous Q8H    valsartan  40 mg Oral Daily      PRN Meds:   Current Facility-Administered Medications:     dextrose 50%, 12.5 g, Intravenous, PRN    dextrose 50%, 25 g, Intravenous, PRN    glucagon (human recombinant), 1 mg, Intramuscular, PRN    glucose, 16 g, Oral, PRN    glucose, 24 g, Oral, PRN    naloxone, 0.02 mg, Intravenous, PRN    sodium chloride 0.9%, 10 mL, Intravenous, Q8H PRN   Psychotherapeutics (From admission, onward)      None          Family History:   Family History   Problem Relation Name Age of Onset    Heart disease Mother Chandana         cad s/p stent    Hypertension Mother Tonjaqueline     Cancer Mother Chandana         MDS    Mental illness Mother Chandana     Diabetes Father Hug     Cancer Father Hug         lung, + tobacco    Breast cancer Maternal Aunt      No Known Problems Sister      Cancer Brother Jose         lung, + tobacco    Thyroid disease Daughter      Bipolar disorder Son Matty and Herve     Mental illness Son Matty and Herve     No Known Problems Sister      No Known Problems Brother      No Known Problems Son      Mental illness Sister Betsey     Colon cancer Neg Hx      Ovarian cancer Neg Hx         Substance Abuse History:   Alchohol: 3-5 12 oz beers in the evening  Drug: denies  No h/o rehab    Legal History:   Past  "charges/incarcerations: denies arrest  Pending charges: denies    Family Psychiatric History:   2 sons, mother: psychiatric problems    Social History:   History of Physical/Sexual Abuse: denies  Education: CPA    Employment/Disability: receives social security   Relationship Status/Sexual Orientation:  40  years  Children: 3 adult children  Housing Status: lives with  and a son, has dog  Mosque: believes in God   History: denies   Recreational Activities: cooks, going to gym  Access to Gun: denies     Current Evaluation:     Constitutional  Vitals:  Vitals:    02/09/25 1913 02/09/25 2018 02/09/25 2052 02/09/25 2059   BP: (!) 149/71 124/67     Pulse: 85 81 79    Resp: 17  17 20   Temp: 97.6 °F (36.4 °C)      TempSrc: Oral      SpO2: 95% 95% 100%    Weight: 72.6 kg (160 lb)      Height: 5' 7" (1.702 m)       02/09/25 2102 02/09/25 2231 02/09/25 2235 02/09/25 2330   BP: (!) 157/70 (!) 83/50 (!) 90/51 129/60   Pulse: 79 84 96 93   Resp:  18  16   Temp:    97.4 °F (36.3 °C)   TempSrc:    Oral   SpO2: 97% 97% 95% 95%   Weight:    77.2 kg (170 lb 3.1 oz)   Height:    5' 7" (1.702 m)    02/10/25 0433 02/10/25 0827   BP:  (!) 176/74   Pulse: 96 95   Resp:  17   Temp:  97.9 °F (36.6 °C)   TempSrc:  Oral   SpO2:  96%   Weight:     Height:        General:  Appears stated age     Psychiatric  Level of Consciousness: alert  Orientation: grossly intact  Psychomotor Behavior: calm  Speech: normal r/r/v  Language: normal use of words  Mood: feels shame related to suicide attempt  Affect: full range, appropriate  Thought Process: logical, goal directed  Associations: intact  Thought Content: currently denies SI, denies HI  Memory: grossly intact  Attention: intact for interview  Insight: appears fair  Judgement: appears fair    Laboratory Data:   Recent Results (from the past 36 hours)   CBC auto differential    Collection Time: 02/09/25  7:26 PM   Result Value Ref Range    WBC 6.52 3.90 - 12.70 K/uL    RBC " 4.04 4.00 - 5.40 M/uL    Hemoglobin 12.9 12.0 - 16.0 g/dL    Hematocrit 38.3 37.0 - 48.5 %    MCV 95 82 - 98 fL    MCH 31.9 (H) 27.0 - 31.0 pg    MCHC 33.7 32.0 - 36.0 g/dL    RDW 12.4 11.5 - 14.5 %    Platelets 250 150 - 450 K/uL    MPV 8.4 (L) 9.2 - 12.9 fL    Immature Granulocytes 0.5 0.0 - 0.5 %    Gran # (ANC) 3.8 1.8 - 7.7 K/uL    Immature Grans (Abs) 0.03 0.00 - 0.04 K/uL    Lymph # 1.9 1.0 - 4.8 K/uL    Mono # 0.6 0.3 - 1.0 K/uL    Eos # 0.2 0.0 - 0.5 K/uL    Baso # 0.04 0.00 - 0.20 K/uL    nRBC 0 0 /100 WBC    Gran % 58.3 38.0 - 73.0 %    Lymph % 29.1 18.0 - 48.0 %    Mono % 8.7 4.0 - 15.0 %    Eosinophil % 2.8 0.0 - 8.0 %    Basophil % 0.6 0.0 - 1.9 %    Differential Method Automated    Comprehensive metabolic panel    Collection Time: 02/09/25  7:26 PM   Result Value Ref Range    Sodium 135 (L) 136 - 145 mmol/L    Potassium 3.4 (L) 3.5 - 5.1 mmol/L    Chloride 105 95 - 110 mmol/L    CO2 16 (L) 23 - 29 mmol/L    Glucose 100 70 - 110 mg/dL    BUN 9 8 - 23 mg/dL    Creatinine 0.7 0.5 - 1.4 mg/dL    Calcium 9.7 8.7 - 10.5 mg/dL    Total Protein 7.5 6.0 - 8.4 g/dL    Albumin 3.8 3.5 - 5.2 g/dL    Total Bilirubin 0.3 0.1 - 1.0 mg/dL    Alkaline Phosphatase 79 40 - 150 U/L    AST 46 (H) 10 - 40 U/L    ALT 26 10 - 44 U/L    eGFR >60 >60 mL/min/1.73 m^2    Anion Gap 14 8 - 16 mmol/L   Ethanol    Collection Time: 02/09/25  7:26 PM   Result Value Ref Range    Alcohol, Serum 144 (H) <10 mg/dL   Acetaminophen level    Collection Time: 02/09/25  7:26 PM   Result Value Ref Range    Acetaminophen (Tylenol), Serum <3.0 (L) 10.0 - 20.0 ug/mL   HIV 1/2 Ag/Ab (4th Gen)    Collection Time: 02/09/25  7:26 PM   Result Value Ref Range    HIV 1/2 Ag/Ab Negative Negative   Hepatitis C Antibody    Collection Time: 02/09/25  7:26 PM   Result Value Ref Range    Hepatitis C Ab Negative Negative   EKG 12-lead    Collection Time: 02/09/25  7:39 PM   Result Value Ref Range    QRS Duration 84 ms    OHS QTC Calculation 487 ms   Urinalysis,  Reflex to Urine Culture Urine, Clean Catch    Collection Time: 02/09/25  7:40 PM    Specimen: Urine   Result Value Ref Range    Specimen UA Urine, Clean Catch     Color, UA Colorless (A) Yellow, Straw, Ila    Appearance, UA Clear Clear    pH, UA 6.0 5.0 - 8.0    Specific Gravity, UA <1.005 (A) 1.005 - 1.030    Protein, UA Negative Negative    Glucose, UA Negative Negative    Ketones, UA Negative Negative    Bilirubin (UA) Negative Negative    Occult Blood UA Trace (A) Negative    Nitrite, UA Negative Negative    Urobilinogen, UA Negative <2.0 EU/dL    Leukocytes, UA Trace (A) Negative   Drug screen panel, emergency    Collection Time: 02/09/25  7:40 PM   Result Value Ref Range    Benzodiazepines Negative Negatiive    Methadone metabolites Negative Negative    Cocaine (Metab.) Negative Negative    Opiate Scrn, Ur Negative Negative    Amphetamine Screen, Ur Negative Negative    THC Negative Negative    Phencyclidine Negative Negative    Creatinine, Urine 12.2 (L) 15.0 - 325.0 mg/dL    Toxicology Information SEE COMMENT    Urinalysis Microscopic    Collection Time: 02/09/25  7:40 PM   Result Value Ref Range    RBC, UA 1 0 - 4 /hpf    WBC, UA 0 0 - 5 /hpf    Squam Epithel, UA 0 /hpf    Microscopic Comment SEE COMMENT    Comprehensive Metabolic Panel (CMP)    Collection Time: 02/10/25  4:32 AM   Result Value Ref Range    Sodium 142 136 - 145 mmol/L    Potassium 4.2 3.5 - 5.1 mmol/L    Chloride 110 95 - 110 mmol/L    CO2 22 (L) 23 - 29 mmol/L    Glucose 106 70 - 110 mg/dL    BUN 10 8 - 23 mg/dL    Creatinine 0.7 0.5 - 1.4 mg/dL    Calcium 9.3 8.7 - 10.5 mg/dL    Total Protein 6.3 6.0 - 8.4 g/dL    Albumin 3.3 (L) 3.5 - 5.2 g/dL    Total Bilirubin 0.4 0.1 - 1.0 mg/dL    Alkaline Phosphatase 63 40 - 150 U/L    AST 32 10 - 40 U/L    ALT 23 10 - 44 U/L    eGFR >60 >60 mL/min/1.73 m^2    Anion Gap 10 8 - 16 mmol/L   Magnesium    Collection Time: 02/10/25  4:32 AM   Result Value Ref Range    Magnesium 1.8 1.6 - 2.6 mg/dL    Phosphorus    Collection Time: 02/10/25  4:32 AM   Result Value Ref Range    Phosphorus 4.1 2.7 - 4.5 mg/dL   CBC with Automated Differential    Collection Time: 02/10/25  4:32 AM   Result Value Ref Range    WBC 5.16 3.90 - 12.70 K/uL    RBC 3.74 (L) 4.00 - 5.40 M/uL    Hemoglobin 12.0 12.0 - 16.0 g/dL    Hematocrit 35.9 (L) 37.0 - 48.5 %    MCV 96 82 - 98 fL    MCH 32.1 (H) 27.0 - 31.0 pg    MCHC 33.4 32.0 - 36.0 g/dL    RDW 12.7 11.5 - 14.5 %    Platelets 223 150 - 450 K/uL    MPV 8.8 (L) 9.2 - 12.9 fL    Immature Granulocytes 0.4 0.0 - 0.5 %    Gran # (ANC) 2.6 1.8 - 7.7 K/uL    Immature Grans (Abs) 0.02 0.00 - 0.04 K/uL    Lymph # 1.8 1.0 - 4.8 K/uL    Mono # 0.5 0.3 - 1.0 K/uL    Eos # 0.2 0.0 - 0.5 K/uL    Baso # 0.03 0.00 - 0.20 K/uL    nRBC 0 0 /100 WBC    Gran % 50.7 38.0 - 73.0 %    Lymph % 35.3 18.0 - 48.0 %    Mono % 9.5 4.0 - 15.0 %    Eosinophil % 3.5 0.0 - 8.0 %    Basophil % 0.6 0.0 - 1.9 %    Differential Method Automated         Assessment - Diagnosis - Goals:     Impression:   S/p suicide attempt yesterday  Depressive d/o, unspecified  Alcohol use  Serum alcohol on presentation 144  In 8/24 Vitamin B12 was low normal and Vitamin D was low.    Recommendations:   - continue PEC/CEC  - please have sitter monitor the patient at all times  - if/when medically cleared: psychiatric hospitalization  - no standing psychotropic medication for now  - monitor for signs of alcohol withdrawal  - Thiamine, Folate, MVI  - obtain serum levels of B12, Vit. D  - while medically hospitalized, please obtain daily telepsych f/u       Total time, including chart review, time with patient, obtaining collateral info[if possible]: 100 min

## 2025-02-10 NOTE — SUBJECTIVE & OBJECTIVE
Past Medical History:   Diagnosis Date    Anxiety     Esophageal stricture     GERD (gastroesophageal reflux disease)     H/O cold sores     Hyperlipidemia     Insomnia     Irritable bowel syndrome     Postmenopausal bleeding        Past Surgical History:   Procedure Laterality Date    DILATION AND CURETTAGE OF UTERUS  2007, 2013    ECTOPIC PREGNANCY SURGERY  1986    removal of portion of tube    gerd      KNEE SURGERY Right 2015    arthroscopy    TONSILLECTOMY         Review of patient's allergies indicates:  No Known Allergies    Current Facility-Administered Medications on File Prior to Encounter   Medication    triamcinolone acetonide injection 10 mg     Current Outpatient Medications on File Prior to Encounter   Medication Sig    acyclovir 5% (ZOVIRAX) 5 % Crea Apply topically Daily.    atorvastatin (LIPITOR) 80 MG tablet Take 1 tablet (80 mg total) by mouth every evening.    CALCIUM ORAL Take by mouth.    cholecalciferol, vitamin D3, (VITAMIN D3) 2,000 unit Cap Take 1 capsule (2,000 Units total) by mouth once daily.    clobetasol 0.05% (TEMOVATE) 0.05 % Oint AAA bid avoid face/groin (Patient not taking: Reported on 8/22/2024)    diphth,pertus,acell,,tetanus (BOOSTRIX TDAP) 2.5-8-5 Lf-mcg-Lf/0.5mL Syrg injection Inject into the muscle. (Patient not taking: Reported on 8/22/2024)    EScitalopram oxalate (LEXAPRO) 20 MG tablet TAKE 1.5 TABLETS (30 MG TOTAL) BY MOUTH ONCE DAILY.    hydroCHLOROthiazide (HYDRODIURIL) 12.5 MG Tab Take 1 tablet (12.5 mg total) by mouth once daily.    hydrOXYzine HCL (ATARAX) 10 MG Tab TAKE 1 TABLET (10 MG TOTAL) BY MOUTH 3 (THREE) TIMES DAILY AS NEEDED (ANXIETY).    mupirocin (BACTROBAN) 2 % ointment APPLY TOPICALLY TWICE DAILY TO NARES. USE CLEAN SWAB EACH NARE, EACH TIME, FOR 7 DAYS    progesterone (PROMETRIUM) 100 MG capsule TAKE ONE BY MOUTH NIGHTLY    traZODone (DESYREL) 50 MG tablet Take 1 tablet (50 mg total) by mouth every evening. prn    triamcinolone acetonide 0.1% (KENALOG)  0.1 % ointment SMARTSI Topical Daily PRN (Patient not taking: Reported on 2024)    valACYclovir (VALTREX) 1000 MG tablet TAKE (1/2) HALF BY MOUTH EVERY 12 HOURS PRN    valsartan (DIOVAN) 320 MG tablet Take 1 tablet (320 mg total) by mouth once daily.     Family History       Problem Relation (Age of Onset)    Bipolar disorder Son    Breast cancer Maternal Aunt    Cancer Mother, Father, Brother    Diabetes Father    Heart disease Mother    Hypertension Mother    Mental illness Mother, Son, Sister    No Known Problems Sister, Sister, Brother, Son    Thyroid disease Daughter          Tobacco Use    Smoking status: Never    Smokeless tobacco: Never   Substance and Sexual Activity    Alcohol use: Yes     Comment: occasional    Drug use: No    Sexual activity: Yes     Partners: Male     Birth control/protection: Post-menopausal     Comment:  to Dylan     Review of Systems   Constitutional:  Negative for activity change, appetite change and fever.   HENT:  Negative for congestion, ear pain, rhinorrhea and sinus pressure.    Eyes:  Negative for pain and discharge.   Respiratory:  Negative for cough, chest tightness, shortness of breath and wheezing.    Cardiovascular:  Negative for chest pain and leg swelling.   Gastrointestinal:  Negative for abdominal distention, abdominal pain, diarrhea, nausea and vomiting.   Endocrine: Negative for cold intolerance and heat intolerance.   Genitourinary:  Negative for difficulty urinating, flank pain, frequency, hematuria and urgency.   Musculoskeletal:  Negative for arthralgias, joint swelling and myalgias.   Allergic/Immunologic: Negative for environmental allergies and food allergies.   Neurological:  Negative for dizziness, weakness, light-headedness and headaches.   Hematological:  Does not bruise/bleed easily.   Psychiatric/Behavioral:  Negative for agitation, behavioral problems and decreased concentration.      Objective:     Vital Signs (Most Recent):  Temp: 97.6  °F (36.4 °C) (02/09/25 1913)  Pulse: 79 (02/09/25 2102)  Resp: 20 (02/09/25 2059)  BP: (!) 157/70 (02/09/25 2102)  SpO2: 97 % (02/09/25 2102) Vital Signs (24h Range):  Temp:  [97.6 °F (36.4 °C)] 97.6 °F (36.4 °C)  Pulse:  [79-85] 79  Resp:  [17-20] 20  SpO2:  [95 %-100 %] 97 %  BP: (124-157)/(67-71) 157/70     Weight: 72.6 kg (160 lb)  Body mass index is 25.06 kg/m².     Physical Exam  Constitutional:       Appearance: Normal appearance. She is well-developed.   HENT:      Head: Normocephalic.   Eyes:      General:         Right eye: No discharge.         Left eye: No discharge.      Conjunctiva/sclera: Conjunctivae normal.   Cardiovascular:      Rate and Rhythm: Normal rate and regular rhythm.      Pulses:           Radial pulses are 2+ on the right side and 2+ on the left side.      Heart sounds: Normal heart sounds.   Pulmonary:      Effort: Pulmonary effort is normal. No respiratory distress.      Breath sounds: Normal breath sounds.   Abdominal:      General: Bowel sounds are normal. There is no distension.      Palpations: Abdomen is soft.      Tenderness: There is no abdominal tenderness.   Musculoskeletal:         General: Normal range of motion.      Cervical back: Normal range of motion and neck supple.   Skin:     General: Skin is warm and dry.   Neurological:      Mental Status: She is alert and oriented to person, place, and time.      GCS: GCS eye subscore is 4. GCS verbal subscore is 5. GCS motor subscore is 6.      Motor: Motor function is intact.   Psychiatric:         Mood and Affect: Mood is depressed.         Speech: Speech normal.         Behavior: Behavior normal.         Thought Content: Thought content normal.                Significant Labs: All pertinent labs within the past 24 hours have been reviewed.  CBC:   Recent Labs   Lab 02/09/25 1926   WBC 6.52   HGB 12.9   HCT 38.3        CMP:   Recent Labs   Lab 02/09/25 1926   *   K 3.4*      CO2 16*      BUN 9    CREATININE 0.7   CALCIUM 9.7   PROT 7.5   ALBUMIN 3.8   BILITOT 0.3   ALKPHOS 79   AST 46*   ALT 26   ANIONGAP 14       Significant Imaging: I have reviewed all pertinent imaging results/findings within the past 24 hours.

## 2025-02-10 NOTE — PLAN OF CARE
MOON Message     Medicare Outpatient and Observation Notification regarding financial responsibility Explained to patient/caregiver; Signed/date by patient/caregiver   Date CONNER was signed 2/10/2025   Time CONNER was signed 102

## 2025-02-10 NOTE — PROGRESS NOTES
[address] Medicine  Progress Note    Patient Name: Odilia Winslow  MRN: [id number]  Patient Class: IP- Inpatient   Admission Date: 2/9/2025  Length of Stay: 0 days  Attending Physician: Idalmis Wilde MD  Primary Care Provider: Annamarie Edwards MD        Subjective     Principal Problem:Intentional drug overdose        HPI:  HPI by Ariel Ye NP:  The patient is a 63 y/o female with anxiety, HLD, IBS, and esophageal stricture who presents with depression and attempted suicide by taking 12 trazodone 50 mg pills PTA. Pt states she takes Lexapro for her depression. She has had a very hard month. She has 2 mentally disabled sons, her  has cancer, they don't have enough money to pay their bills, and her daughter just went through a depressive episode.  She will be admitted for further management of her drug overdose and Tele-Psych consult.    Overview/Hospital Course:  Patient admitted on telemetry to monitor the QTc interval.  She was seen by telepsychiatry and continued PEC was recommended.    Interval History:  Patient known to me as her  has been admitted here multiple times.  She is familiar with inpatient psych facilities in the area due to her son having been hospitalized.   at bedside in the afternoon, feels patient would be safe with him and her sons at home, they plan to be present 24 hours/day.  Patient says she wishes she had not taken the overdose and would not do it again.     Review of Systems   Constitutional:  Negative for chills and fever.   Respiratory:  Negative for cough and shortness of breath.    Cardiovascular:  Negative for chest pain and palpitations.   Psychiatric/Behavioral:  Positive for dysphoric mood.      Objective:     Vital Signs (Most Recent):  Temp: 98.4 °F (36.9 °C) (02/10/25 1421)  Pulse: 98 (02/10/25 1421)  Resp: 17 (02/10/25 1421)  BP: (!) 155/70 (02/10/25 1421)  SpO2: (!) 93 % (02/10/25 1421) Vital Signs (24h  Range):  Temp:  [97.4 °F (36.3 °C)-98.4 °F (36.9 °C)] 98.4 °F (36.9 °C)  Pulse:  [79-98] 98  Resp:  [16-20] 17  SpO2:  [93 %-100 %] 93 %  BP: ()/(50-79) 155/70     Weight: 77.2 kg (170 lb 3.1 oz)  Body mass index is 26.66 kg/m².    Intake/Output Summary (Last 24 hours) at 2/10/2025 1500  Last data filed at 2/10/2025 1020  Gross per 24 hour   Intake 520 ml   Output 2400 ml   Net -1880 ml         Physical Exam  Cardiovascular:      Rate and Rhythm: Normal rate and regular rhythm.      Heart sounds: Normal heart sounds. No murmur heard.     No gallop.   Pulmonary:      Effort: Pulmonary effort is normal.      Breath sounds: Normal breath sounds.   Abdominal:      General: Bowel sounds are normal.      Palpations: Abdomen is soft.   Skin:     General: Skin is warm and dry.   Neurological:      General: No focal deficit present.      Mental Status: She is alert.   Psychiatric:         Mood and Affect: Mood normal.         Behavior: Behavior normal.             Significant Labs: All pertinent labs within the past 24 hours have been reviewed.    Significant Imaging: I have reviewed all pertinent imaging results/findings within the past 24 hours.    Assessment and Plan     * Intentional drug overdose  Patient took trazodone overdose in suicide attempt.    Mildly prolonged QTc last night and this morning.  AAOx3    - Tele-Psych consult recommending transfer to inpatient psych facility when medically cleared  - OK to d/c IV fluids  - Encourage oral fluids  - Will watch QTc on telemetry with serial EKGs for another 24 hours, then repeat psych consult to evaluate for safety to lift PEC.  Patient and family seem reliable and she is unlikely to do this again.    Dyslipidemia  Resume statin on discharge  Has favorable lipid profile      HTN (hypertension), benign  Patient's blood pressure range in the last 24 hours was: BP  Min: 83/50  Max: 157/70.The patient's inpatient anti-hypertensive regimen is listed below:  Current  Antihypertensives  valsartan tablet 320 mg, Daily, Oral    Plan  -Patient hypotensive briefly overnight, normal this AM - will give valsartan 40 mg instead of 320 mg, reassess the bigger dose prior to transfer to inpatient psych  -Potassium 3.4 this AM, will give 40 mEq po times one.      VTE Risk Mitigation (From admission, onward)           Ordered     heparin (porcine) injection 5,000 Units  Every 8 hours         02/09/25 2234     IP VTE LOW RISK PATIENT  Once         02/09/25 2234     Place sequential compression device  Until discontinued         02/09/25 2234                    Discharge Planning   MICHELLE:      Code Status: Full Code   Medical Readiness for Discharge Date:                            Idalmis Don MD  Department of Hospital Medicine   [facility] - Med Surg ([address])

## 2025-02-10 NOTE — H&P
"  [facility] Medicine  History & Physical    Patient Name: Odilia Winslow  MRN: [id number]  Patient Class: OP- Observation  Admission Date: 2/9/2025  Attending Physician: Idalmis Wilde MD   Primary Care Provider: Annamarie Edwards MD         Patient information was obtained from patient, past medical records, and ER records.     Subjective:     Principal Problem:Intentional drug overdose    Chief Complaint:   Chief Complaint   Patient presents with    Suicidal     Feeling extremely depressed over the past week  Reports SI w/ attempt and took 10 trazodone approximately one hour ago  "I just been extremely depressed and overwhelmed."  "I took the medicine bc I wanted to go to sleep and not wake up."      Denies any HI at this time  Admits to drinking a few beers earlier today         HPI: The patient is a 65 y/o female with anxiety, HLD, IBS, and esophageal stricture who presents with depression and attempted suicide by taking 12 trazodone 50 mg pills PTA. Pt states she takes Lexapro for her depression. She has had a very hard month. She has 2 mentally disabled sons, her  has cancer, they don't have enough money to pay their bills, and her daughter just went through a depressive episode.  She will be admitted for further management of her drug overdose and Tele-Psych consult.    Past Medical History:   Diagnosis Date    Anxiety     Esophageal stricture     GERD (gastroesophageal reflux disease)     H/O cold sores     Hyperlipidemia     Insomnia     Irritable bowel syndrome     Postmenopausal bleeding        Past Surgical History:   Procedure Laterality Date    DILATION AND CURETTAGE OF UTERUS  2007, 2013    ECTOPIC PREGNANCY SURGERY  1986    removal of portion of tube    gerd      KNEE SURGERY Right 2015    arthroscopy    TONSILLECTOMY         Review of patient's allergies indicates:  No Known Allergies    Current Facility-Administered Medications on File Prior to Encounter "   Medication    triamcinolone acetonide injection 10 mg     Current Outpatient Medications on File Prior to Encounter   Medication Sig    acyclovir 5% (ZOVIRAX) 5 % Crea Apply topically Daily.    atorvastatin (LIPITOR) 80 MG tablet Take 1 tablet (80 mg total) by mouth every evening.    CALCIUM ORAL Take by mouth.    cholecalciferol, vitamin D3, (VITAMIN D3) 2,000 unit Cap Take 1 capsule (2,000 Units total) by mouth once daily.    clobetasol 0.05% (TEMOVATE) 0.05 % Oint AAA bid avoid face/groin (Patient not taking: Reported on 2024)    diphth,pertus,acell,,tetanus (BOOSTRIX TDAP) 2.5-8-5 Lf-mcg-Lf/0.5mL Syrg injection Inject into the muscle. (Patient not taking: Reported on 2024)    EScitalopram oxalate (LEXAPRO) 20 MG tablet TAKE 1.5 TABLETS (30 MG TOTAL) BY MOUTH ONCE DAILY.    hydroCHLOROthiazide (HYDRODIURIL) 12.5 MG Tab Take 1 tablet (12.5 mg total) by mouth once daily.    hydrOXYzine HCL (ATARAX) 10 MG Tab TAKE 1 TABLET (10 MG TOTAL) BY MOUTH 3 (THREE) TIMES DAILY AS NEEDED (ANXIETY).    mupirocin (BACTROBAN) 2 % ointment APPLY TOPICALLY TWICE DAILY TO NARES. USE CLEAN SWAB EACH NARE, EACH TIME, FOR 7 DAYS    progesterone (PROMETRIUM) 100 MG capsule TAKE ONE BY MOUTH NIGHTLY    traZODone (DESYREL) 50 MG tablet Take 1 tablet (50 mg total) by mouth every evening. prn    triamcinolone acetonide 0.1% (KENALOG) 0.1 % ointment SMARTSI Topical Daily PRN (Patient not taking: Reported on 2024)    valACYclovir (VALTREX) 1000 MG tablet TAKE (1/2) HALF BY MOUTH EVERY 12 HOURS PRN    valsartan (DIOVAN) 320 MG tablet Take 1 tablet (320 mg total) by mouth once daily.     Family History       Problem Relation (Age of Onset)    Bipolar disorder Son    Breast cancer Maternal Aunt    Cancer Mother, Father, Brother    Diabetes Father    Heart disease Mother    Hypertension Mother    Mental illness Mother, Son, Sister    No Known Problems Sister, Sister, Brother, Son    Thyroid disease Daughter          Tobacco  Use    Smoking status: Never    Smokeless tobacco: Never   Substance and Sexual Activity    Alcohol use: Yes     Comment: occasional    Drug use: No    Sexual activity: Yes     Partners: Male     Birth control/protection: Post-menopausal     Comment:  to Dylan     Review of Systems   Constitutional:  Negative for activity change, appetite change and fever.   HENT:  Negative for congestion, ear pain, rhinorrhea and sinus pressure.    Eyes:  Negative for pain and discharge.   Respiratory:  Negative for cough, chest tightness, shortness of breath and wheezing.    Cardiovascular:  Negative for chest pain and leg swelling.   Gastrointestinal:  Negative for abdominal distention, abdominal pain, diarrhea, nausea and vomiting.   Endocrine: Negative for cold intolerance and heat intolerance.   Genitourinary:  Negative for difficulty urinating, flank pain, frequency, hematuria and urgency.   Musculoskeletal:  Negative for arthralgias, joint swelling and myalgias.   Allergic/Immunologic: Negative for environmental allergies and food allergies.   Neurological:  Negative for dizziness, weakness, light-headedness and headaches.   Hematological:  Does not bruise/bleed easily.   Psychiatric/Behavioral:  Negative for agitation, behavioral problems and decreased concentration.      Objective:     Vital Signs (Most Recent):  Temp: 97.6 °F (36.4 °C) (02/09/25 1913)  Pulse: 79 (02/09/25 2102)  Resp: 20 (02/09/25 2059)  BP: (!) 157/70 (02/09/25 2102)  SpO2: 97 % (02/09/25 2102) Vital Signs (24h Range):  Temp:  [97.6 °F (36.4 °C)] 97.6 °F (36.4 °C)  Pulse:  [79-85] 79  Resp:  [17-20] 20  SpO2:  [95 %-100 %] 97 %  BP: (124-157)/(67-71) 157/70     Weight: 72.6 kg (160 lb)  Body mass index is 25.06 kg/m².     Physical Exam  Constitutional:       Appearance: Normal appearance. She is well-developed.   HENT:      Head: Normocephalic.   Eyes:      General:         Right eye: No discharge.         Left eye: No discharge.       Conjunctiva/sclera: Conjunctivae normal.   Cardiovascular:      Rate and Rhythm: Normal rate and regular rhythm.      Pulses:           Radial pulses are 2+ on the right side and 2+ on the left side.      Heart sounds: Normal heart sounds.   Pulmonary:      Effort: Pulmonary effort is normal. No respiratory distress.      Breath sounds: Normal breath sounds.   Abdominal:      General: Bowel sounds are normal. There is no distension.      Palpations: Abdomen is soft.      Tenderness: There is no abdominal tenderness.   Musculoskeletal:         General: Normal range of motion.      Cervical back: Normal range of motion and neck supple.   Skin:     General: Skin is warm and dry.   Neurological:      Mental Status: She is alert and oriented to person, place, and time.      GCS: GCS eye subscore is 4. GCS verbal subscore is 5. GCS motor subscore is 6.      Motor: Motor function is intact.   Psychiatric:         Mood and Affect: Mood is depressed.         Speech: Speech normal.         Behavior: Behavior normal.         Thought Content: Thought content normal.                Significant Labs: All pertinent labs within the past 24 hours have been reviewed.  CBC:   Recent Labs   Lab 02/09/25 1926   WBC 6.52   HGB 12.9   HCT 38.3        CMP:   Recent Labs   Lab 02/09/25 1926   *   K 3.4*      CO2 16*      BUN 9   CREATININE 0.7   CALCIUM 9.7   PROT 7.5   ALBUMIN 3.8   BILITOT 0.3   ALKPHOS 79   AST 46*   ALT 26   ANIONGAP 14       Significant Imaging: I have reviewed all pertinent imaging results/findings within the past 24 hours.  Assessment/Plan:     * Intentional drug overdose  Patient took trazodone.  Mildly prolonged QT interval.  AAOx3    Consult Tele-Psych  IVF  Encourage oral fluids  On PEC      Dyslipidemia  Hold statin for discharge      HTN (hypertension), benign  Patient's blood pressure range in the last 24 hours was: BP  Min: 124/67  Max: 157/70.The patient's inpatient  anti-hypertensive regimen is listed below:  Current Antihypertensives  valsartan tablet 320 mg, Daily, Oral    Plan  - BP is controlled, no changes needed to their regimen        VTE Risk Mitigation (From admission, onward)           Ordered     heparin (porcine) injection 5,000 Units  Every 8 hours         02/09/25 2234     IP VTE LOW RISK PATIENT  Once         02/09/25 2234     Place sequential compression device  Until discontinued         02/09/25 2234                         On 02/09/2025, patient should be placed in hospital observation services under my care in collaboration with Dr. Wilde.           Ariel Ye, NP  Department of Hospital Medicine  [facility] - Emergency Dept

## 2025-02-10 NOTE — ASSESSMENT & PLAN NOTE
Patient took trazodone overdose in suicide attempt.    Mildly prolonged QTc last night and this morning.  AAOx3    - Tele-Psych consult recommending transfer to inpatient psych facility when medically cleared  - OK to d/c IV fluids  - Encourage oral fluids  - Will watch QTc on telemetry with serial EKGs for another 24 hours, then repeat psych consult to evaluate for safety to lift PEC.  Patient and family seem reliable and she is unlikely to do this again.

## 2025-02-10 NOTE — NURSING
Pt care assumed. Pt AAO x 4 with RR easy, even,on room air comfortably seated on the bed  20g PIV to the LF  LR infusing @ 100 ml/hr, with no redness or swelling noted to sites. Pt verbalizes to be feeling better and regrets taking the pills. Plan of care explained will  continue to educate pt encouraged to ask any questions, safety measures in place.Sitter remains @ the bedside per PEC Instructed to call for any assistance., verbalized understanding.POC ongoing.    MD will assess/prescribe medications as needed and monitor patient progress during daily rounds;NSG will administer medications per MD order and provide patient education on wellness topics during medication administration times.

## 2025-02-10 NOTE — ASSESSMENT & PLAN NOTE
Patient's blood pressure range in the last 24 hours was: BP  Min: 83/50  Max: 157/70.The patient's inpatient anti-hypertensive regimen is listed below:  Current Antihypertensives  valsartan tablet 320 mg, Daily, Oral    Plan  -Patient hypotensive briefly overnight, normal this AM - will give valsartan 40 mg instead of 320 mg, reassess the bigger dose prior to transfer to inpatient psych  -Potassium 3.4 this AM, will give 40 mEq po times one.

## 2025-02-10 NOTE — HOSPITAL COURSE
Patient admitted on telemetry to monitor the QTc interval.  She was seen by telepsychiatry and continued PEC was recommended. Qtc remained stable. Pt very remorseful for her actions and not suicidal. Pt re-evaluated by Psych 2/11 and deemed stable for discontinuing PEC with close outpatient psych follow-up.

## 2025-02-10 NOTE — NURSING
"  Patient arrived to unit via wheelchair/stretcher, pushed by transportation personnel and accompanied by  on:   admit date: 02/9/25 at 2330 time:     accompanied by: Nurse    Name of Transferring Facility: [facility]    Legal Status of patient at time of admission: PEC                Date and time of legal status:2/9/25 @1925    Presenting problem:   Suicidal       Feeling extremely depressed over the past week  Reports SI w/ attempt and took 10 trazodone approximately one hour ago  "I just been extremely depressed and overwhelmed."  "I took the medicine bc I wanted to go to sleep and not wake up.         Mental status upon admit: A&Ox4  Suicidal? No    Any precautions? SVC, suicide    PRN given? No    Restrained upon arrival? No  Restrained prior to arrival?  No    ETOH level upon admit:  144  "

## 2025-02-11 VITALS
TEMPERATURE: 98 F | HEART RATE: 81 BPM | WEIGHT: 170.19 LBS | OXYGEN SATURATION: 97 % | HEIGHT: 67 IN | SYSTOLIC BLOOD PRESSURE: 167 MMHG | DIASTOLIC BLOOD PRESSURE: 76 MMHG | BODY MASS INDEX: 26.71 KG/M2 | RESPIRATION RATE: 17 BRPM

## 2025-02-11 PROBLEM — T14.91XA SUICIDE ATTEMPT: Status: ACTIVE | Noted: 2025-02-11

## 2025-02-11 LAB
ALBUMIN SERPL BCP-MCNC: 3.3 G/DL (ref 3.5–5.2)
ALP SERPL-CCNC: 63 U/L (ref 40–150)
ALT SERPL W/O P-5'-P-CCNC: 25 U/L (ref 10–44)
ANION GAP SERPL CALC-SCNC: 11 MMOL/L (ref 8–16)
AST SERPL-CCNC: 37 U/L (ref 10–40)
BASOPHILS # BLD AUTO: 0.01 K/UL (ref 0–0.2)
BASOPHILS NFR BLD: 0.2 % (ref 0–1.9)
BILIRUB SERPL-MCNC: 0.4 MG/DL (ref 0.1–1)
BUN SERPL-MCNC: 7 MG/DL (ref 8–23)
CALCIUM SERPL-MCNC: 9.3 MG/DL (ref 8.7–10.5)
CHLORIDE SERPL-SCNC: 110 MMOL/L (ref 95–110)
CO2 SERPL-SCNC: 20 MMOL/L (ref 23–29)
CREAT SERPL-MCNC: 0.6 MG/DL (ref 0.5–1.4)
DIFFERENTIAL METHOD BLD: ABNORMAL
EOSINOPHIL # BLD AUTO: 0.2 K/UL (ref 0–0.5)
EOSINOPHIL NFR BLD: 3.1 % (ref 0–8)
ERYTHROCYTE [DISTWIDTH] IN BLOOD BY AUTOMATED COUNT: 13 % (ref 11.5–14.5)
EST. GFR  (NO RACE VARIABLE): >60 ML/MIN/1.73 M^2
GLUCOSE SERPL-MCNC: 109 MG/DL (ref 70–110)
HCT VFR BLD AUTO: 36.8 % (ref 37–48.5)
HGB BLD-MCNC: 12.4 G/DL (ref 12–16)
IMM GRANULOCYTES # BLD AUTO: 0.01 K/UL (ref 0–0.04)
IMM GRANULOCYTES NFR BLD AUTO: 0.2 % (ref 0–0.5)
LYMPHOCYTES # BLD AUTO: 1 K/UL (ref 1–4.8)
LYMPHOCYTES NFR BLD: 18.6 % (ref 18–48)
MAGNESIUM SERPL-MCNC: 1.8 MG/DL (ref 1.6–2.6)
MCH RBC QN AUTO: 32.5 PG (ref 27–31)
MCHC RBC AUTO-ENTMCNC: 33.7 G/DL (ref 32–36)
MCV RBC AUTO: 97 FL (ref 82–98)
MONOCYTES # BLD AUTO: 0.5 K/UL (ref 0.3–1)
MONOCYTES NFR BLD: 9.6 % (ref 4–15)
NEUTROPHILS # BLD AUTO: 3.7 K/UL (ref 1.8–7.7)
NEUTROPHILS NFR BLD: 68.3 % (ref 38–73)
NRBC BLD-RTO: 0 /100 WBC
OHS QRS DURATION: 76 MS
OHS QRS DURATION: 80 MS
OHS QTC CALCULATION: 454 MS
OHS QTC CALCULATION: 467 MS
PHOSPHATE SERPL-MCNC: 4.1 MG/DL (ref 2.7–4.5)
PLATELET # BLD AUTO: 232 K/UL (ref 150–450)
PMV BLD AUTO: 9.5 FL (ref 9.2–12.9)
POTASSIUM SERPL-SCNC: 4.3 MMOL/L (ref 3.5–5.1)
PROT SERPL-MCNC: 6.6 G/DL (ref 6–8.4)
RBC # BLD AUTO: 3.81 M/UL (ref 4–5.4)
SODIUM SERPL-SCNC: 141 MMOL/L (ref 136–145)
WBC # BLD AUTO: 5.42 K/UL (ref 3.9–12.7)

## 2025-02-11 PROCEDURE — 84100 ASSAY OF PHOSPHORUS: CPT | Mod: HCNC | Performed by: NURSE PRACTITIONER

## 2025-02-11 PROCEDURE — 85025 COMPLETE CBC W/AUTO DIFF WBC: CPT | Mod: HCNC | Performed by: NURSE PRACTITIONER

## 2025-02-11 PROCEDURE — 80053 COMPREHEN METABOLIC PANEL: CPT | Mod: HCNC | Performed by: NURSE PRACTITIONER

## 2025-02-11 PROCEDURE — 36415 COLL VENOUS BLD VENIPUNCTURE: CPT | Mod: HCNC | Performed by: NURSE PRACTITIONER

## 2025-02-11 PROCEDURE — 83735 ASSAY OF MAGNESIUM: CPT | Mod: HCNC | Performed by: NURSE PRACTITIONER

## 2025-02-11 PROCEDURE — 93005 ELECTROCARDIOGRAM TRACING: CPT | Mod: HCNC

## 2025-02-11 PROCEDURE — 93010 ELECTROCARDIOGRAM REPORT: CPT | Mod: HCNC,,, | Performed by: INTERNAL MEDICINE

## 2025-02-11 PROCEDURE — 25000003 PHARM REV CODE 250: Mod: HCNC | Performed by: HOSPITALIST

## 2025-02-11 PROCEDURE — 25000003 PHARM REV CODE 250: Mod: HCNC | Performed by: STUDENT IN AN ORGANIZED HEALTH CARE EDUCATION/TRAINING PROGRAM

## 2025-02-11 PROCEDURE — 99900035 HC TECH TIME PER 15 MIN (STAT): Mod: HCNC

## 2025-02-11 RX ORDER — HYDROCHLOROTHIAZIDE 12.5 MG/1
12.5 TABLET ORAL DAILY
Status: DISCONTINUED | OUTPATIENT
Start: 2025-02-11 | End: 2025-02-11 | Stop reason: HOSPADM

## 2025-02-11 RX ORDER — FOLIC ACID 1 MG/1
1 TABLET ORAL DAILY
Status: DISCONTINUED | OUTPATIENT
Start: 2025-02-11 | End: 2025-02-11 | Stop reason: HOSPADM

## 2025-02-11 RX ORDER — LANOLIN ALCOHOL/MO/W.PET/CERES
100 CREAM (GRAM) TOPICAL DAILY
Status: DISCONTINUED | OUTPATIENT
Start: 2025-02-11 | End: 2025-02-11 | Stop reason: HOSPADM

## 2025-02-11 RX ADMIN — VALSARTAN 40 MG: 40 TABLET, FILM COATED ORAL at 08:02

## 2025-02-11 RX ADMIN — THERA TABS 1 TABLET: TAB at 08:02

## 2025-02-11 RX ADMIN — Medication 100 MG: at 08:02

## 2025-02-11 RX ADMIN — FOLIC ACID 1 MG: 1 TABLET ORAL at 08:02

## 2025-02-11 NOTE — CONSULTS
"  The patient location is  [facility]*     Consults  Consult Start Time: 02/11/2025 09:55 CST  Consult End Time: 02/11/2025 10:50 CST        2/11/2025 9:58 AM   Odilia Winslow   1960   [id number]     Consult requested by: Dr. Cece Fontento  Consult begun at: 9:55 am       Tele-Psychiatry Consult f/u     Please see telepsych consult from yesterday.    From hospital medicine progress note from yesterday:  "Interval History:  Patient known to me as her  has been admitted here multiple times.  She is familiar with inpatient psych facilities in the area due to her son having been hospitalized.   at bedside in the afternoon, feels patient would be safe with him and her sons at home, they plan to be present 24 hours/day.  Patient says she wishes she had not taken the overdose and would not do it again."    On interview by me today:  Feels guilty about suicide attempt 2 days ago. Various aspects of the suicide attempt were touched on. Patient was able to show emotions related to this.    Does not want rehab for alcohol use.    Current Medications:   Scheduled Meds:    folic acid  1 mg Oral Daily    heparin (porcine)  5,000 Units Subcutaneous Q8H    multivitamin  1 tablet Oral Daily    thiamine  100 mg Oral Daily    valsartan  40 mg Oral Daily      PRN Meds:   Current Facility-Administered Medications:     dextrose 50%, 12.5 g, Intravenous, PRN    dextrose 50%, 25 g, Intravenous, PRN    glucagon (human recombinant), 1 mg, Intramuscular, PRN    glucose, 16 g, Oral, PRN    glucose, 24 g, Oral, PRN    naloxone, 0.02 mg, Intravenous, PRN    sodium chloride 0.9%, 10 mL, Intravenous, Q8H PRN   Psychotherapeutics (From admission, onward)      None            Allergies:   Review of patient's allergies indicates:  No Known Allergies     OBJECTIVE:   Vitals   Vitals:    02/11/25 0817   BP: (!) 167/76   Pulse: 76   Resp: 17   Temp: 97.5 °F (36.4 °C)        Labs/Imaging/Studies:   Recent Results " (from the past 36 hours)   Comprehensive Metabolic Panel (CMP)    Collection Time: 02/10/25  4:32 AM   Result Value Ref Range    Sodium 142 136 - 145 mmol/L    Potassium 4.2 3.5 - 5.1 mmol/L    Chloride 110 95 - 110 mmol/L    CO2 22 (L) 23 - 29 mmol/L    Glucose 106 70 - 110 mg/dL    BUN 10 8 - 23 mg/dL    Creatinine 0.7 0.5 - 1.4 mg/dL    Calcium 9.3 8.7 - 10.5 mg/dL    Total Protein 6.3 6.0 - 8.4 g/dL    Albumin 3.3 (L) 3.5 - 5.2 g/dL    Total Bilirubin 0.4 0.1 - 1.0 mg/dL    Alkaline Phosphatase 63 40 - 150 U/L    AST 32 10 - 40 U/L    ALT 23 10 - 44 U/L    eGFR >60 >60 mL/min/1.73 m^2    Anion Gap 10 8 - 16 mmol/L   Magnesium    Collection Time: 02/10/25  4:32 AM   Result Value Ref Range    Magnesium 1.8 1.6 - 2.6 mg/dL   Phosphorus    Collection Time: 02/10/25  4:32 AM   Result Value Ref Range    Phosphorus 4.1 2.7 - 4.5 mg/dL   CBC with Automated Differential    Collection Time: 02/10/25  4:32 AM   Result Value Ref Range    WBC 5.16 3.90 - 12.70 K/uL    RBC 3.74 (L) 4.00 - 5.40 M/uL    Hemoglobin 12.0 12.0 - 16.0 g/dL    Hematocrit 35.9 (L) 37.0 - 48.5 %    MCV 96 82 - 98 fL    MCH 32.1 (H) 27.0 - 31.0 pg    MCHC 33.4 32.0 - 36.0 g/dL    RDW 12.7 11.5 - 14.5 %    Platelets 223 150 - 450 K/uL    MPV 8.8 (L) 9.2 - 12.9 fL    Immature Granulocytes 0.4 0.0 - 0.5 %    Gran # (ANC) 2.6 1.8 - 7.7 K/uL    Immature Grans (Abs) 0.02 0.00 - 0.04 K/uL    Lymph # 1.8 1.0 - 4.8 K/uL    Mono # 0.5 0.3 - 1.0 K/uL    Eos # 0.2 0.0 - 0.5 K/uL    Baso # 0.03 0.00 - 0.20 K/uL    nRBC 0 0 /100 WBC    Gran % 50.7 38.0 - 73.0 %    Lymph % 35.3 18.0 - 48.0 %    Mono % 9.5 4.0 - 15.0 %    Eosinophil % 3.5 0.0 - 8.0 %    Basophil % 0.6 0.0 - 1.9 %    Differential Method Automated    EKG 12-lead    Collection Time: 02/10/25  1:07 PM   Result Value Ref Range    QRS Duration 84 ms    [facility] QTC Calculation 457 ms   Comprehensive Metabolic Panel (CMP)    Collection Time: 02/11/25  4:48 AM   Result Value Ref Range    Sodium 141 136 - 145  mmol/L    Potassium 4.3 3.5 - 5.1 mmol/L    Chloride 110 95 - 110 mmol/L    CO2 20 (L) 23 - 29 mmol/L    Glucose 109 70 - 110 mg/dL    BUN 7 (L) 8 - 23 mg/dL    Creatinine 0.6 0.5 - 1.4 mg/dL    Calcium 9.3 8.7 - 10.5 mg/dL    Total Protein 6.6 6.0 - 8.4 g/dL    Albumin 3.3 (L) 3.5 - 5.2 g/dL    Total Bilirubin 0.4 0.1 - 1.0 mg/dL    Alkaline Phosphatase 63 40 - 150 U/L    AST 37 10 - 40 U/L    ALT 25 10 - 44 U/L    eGFR >60 >60 mL/min/1.73 m^2    Anion Gap 11 8 - 16 mmol/L   Magnesium    Collection Time: 02/11/25  4:48 AM   Result Value Ref Range    Magnesium 1.8 1.6 - 2.6 mg/dL   Phosphorus    Collection Time: 02/11/25  4:48 AM   Result Value Ref Range    Phosphorus 4.1 2.7 - 4.5 mg/dL   CBC with Automated Differential    Collection Time: 02/11/25  4:48 AM   Result Value Ref Range    WBC 5.42 3.90 - 12.70 K/uL    RBC 3.81 (L) 4.00 - 5.40 M/uL    Hemoglobin 12.4 12.0 - 16.0 g/dL    Hematocrit 36.8 (L) 37.0 - 48.5 %    MCV 97 82 - 98 fL    MCH 32.5 (H) 27.0 - 31.0 pg    MCHC 33.7 32.0 - 36.0 g/dL    RDW 13.0 11.5 - 14.5 %    Platelets 232 150 - 450 K/uL    MPV 9.5 9.2 - 12.9 fL    Immature Granulocytes 0.2 0.0 - 0.5 %    Gran # (ANC) 3.7 1.8 - 7.7 K/uL    Immature Grans (Abs) 0.01 0.00 - 0.04 K/uL    Lymph # 1.0 1.0 - 4.8 K/uL    Mono # 0.5 0.3 - 1.0 K/uL    Eos # 0.2 0.0 - 0.5 K/uL    Baso # 0.01 0.00 - 0.20 K/uL    nRBC 0 0 /100 WBC    Gran % 68.3 38.0 - 73.0 %    Lymph % 18.6 18.0 - 48.0 %    Mono % 9.6 4.0 - 15.0 %    Eosinophil % 3.1 0.0 - 8.0 %    Basophil % 0.2 0.0 - 1.9 %    Differential Method Automated       MSE  Level of Consciousness: alert  Orientation: grossly intact  Psychomotor Behavior: calm  Speech: normal r/r/v  Language: normal use of words  Mood: feels guilt related to suicide attempt  Affect: full range, appropriate  Thought Process: logical, goal directed  Associations: intact  Thought Content: currently denies SI, denies HI  Memory: grossly intact  Attention: intact for interview  Insight:  appears fair  Judgement: appears fair    ASSESSMENT/PLAN:     Impression:   S/p suicide attempt yesterday  Depressive d/o, unspecified  Alcohol use[patient currently does not want rehab]  Serum alcohol on presentation 144    Based on currently available information patient does not appear to meet criteria for PEC at this time.  Patient does not want psychiatric hospitalization.   Patient is agreeable to a psychiatric intensive outpatient program     Recommendations:   - discontinue PEC/CEC  - resume Lexapro 30 mg at bedtime  - please have  assist the patient in entering a psychiatric intensive outpatient program as soon as possible  - please have  assist the patient in obtaining an appointment to see a psychotherapist as soon as possible  - please have  assist the patient in obtaining an appointment to see a psychiatrist as soon as possible; if the patient would like to see me, I can see the patient for psychiatric f/u on 2/18 at 3 pm[60 min appointment] at [facility], patient would need to call [phone] to make the appointment[if the patient is in an intensive outpatient program, the appointment with me can be rescheduled, an intensive outpatient program takes priority at this time]    Total time including chart review, time with patient, obtaining collateral info[if necessary/possible]: 55 min  Vishal Frias M.D.  2/11/2025

## 2025-02-11 NOTE — PLAN OF CARE
Case Management Final Discharge Note      Discharge Disposition: home with family    New DME ordered / company name: none    Relevant SDOH / Transition of Care Barriers:  none    Person available to provide assistance at home when needed and their contact information: self    Scheduled followup appointment: PCP, Psychiatry    Referrals placed: SW sent referral packet to [facility].    Transportation: Patient spouse at bedside to transport patient home.         Patient and family educated on discharge services and updated on DC plan. Bedside RN notified, patient clear to discharge from Case Management Perspective.       [facility] - Med Surg ([address])  Discharge Final Note    Primary Care Provider: Annamarie Edwards MD    Expected Discharge Date: 2/11/2025    Final Discharge Note (most recent)       Final Note - 02/11/25 1256          Final Note    Assessment Type Final Discharge Note     Anticipated Discharge Disposition Home or Self Care     Hospital Resources/Appts/Education Provided Provided patient/caregiver with written discharge plan information;Appointments scheduled and added to AVS        Post-Acute Status    Discharge Delays None known at this time                     Important Message from Medicare

## 2025-02-11 NOTE — DISCHARGE INSTRUCTIONS
Please stop taking trazodone  You can continue taking Lexapro 30 mg at night  Please follow-up with outpatient psychiatric

## 2025-02-11 NOTE — DISCHARGE SUMMARY
[facility] Surg [address] Medicine  Discharge Summary      Patient Name: Odilia Winslow  MRN: [id number]  MONSE: [id number]  Patient Class: IP- Inpatient  Admission Date: 2/9/2025  Hospital Length of Stay: 1 days  Discharge Date and Time:  02/11/2025 12:33 PM  Attending Physician: Kenyatta Fontenot MD   Discharging Provider: Kenyatta Romero MD  Primary Care Provider: Annamarie Edwards MD    Primary Care Team: Networked reference to record PCT     HPI:   HPI by Ariel Ye NP:  The patient is a 63 y/o female with anxiety, HLD, IBS, and esophageal stricture who presents with depression and attempted suicide by taking 12 trazodone 50 mg pills PTA. Pt states she takes Lexapro for her depression. She has had a very hard month. She has 2 mentally disabled sons, her  has cancer, they don't have enough money to pay their bills, and her daughter just went through a depressive episode.  She will be admitted for further management of her drug overdose and Tele-Psych consult.        Hospital Course:   Patient admitted on telemetry to monitor the QTc interval.  She was seen by telepsychiatry and continued PEC was recommended. Qtc remained stable. Pt very remorseful for her actions and not suicidal. Pt re-evaluated by Psych 2/11 and deemed stable for discontinuing PEC with close outpatient psych follow-up.       Goals of Care Treatment Preferences:  Code Status: Full Code        Consults:   Consults (From admission, onward)          Status Ordering Provider     Inpatient consult to Telemedicine - Psych  Once        Provider:  Vishal Frias MD    Acknowledged KENYATTA FONTENOT              Final Active Diagnoses:    Diagnosis Date Noted POA    PRINCIPAL PROBLEM:  Intentional drug overdose [T50.902A] 02/09/2025 Yes    Suicide attempt [T14.91XA] 02/11/2025 Yes    HTN (hypertension), benign [I10] 05/15/2013 Yes    Dyslipidemia [E78.5] 05/15/2013 Yes      Problems Resolved During this Admission:        Discharged Condition: good    Disposition: Home or Self Care        Patient Instructions:   Please stop taking trazodone  You can continue taking Lexapro 30 mg at night  Please follow-up with outpatient psychiatric       Ambulatory referral/consult to Psychiatry   Standing Status: Future   Referral Priority: Routine Referral Type: Psychiatric   Referral Reason: Specialty Services Required   Requested Specialty: Psychiatry   Number of Visits Requested: 1         Medications:  Reconciled Home Medications:      Medication List        CONTINUE taking these medications      acyclovir 5% 5 % Crea  Commonly known as: ZOVIRAX  Apply topically Daily.     atorvastatin 80 MG tablet  Commonly known as: LIPITOR  Take 1 tablet (80 mg total) by mouth every evening.     CALCIUM ORAL  Take by mouth.     cholecalciferol (vitamin D3) 50 mcg (2,000 unit) Cap capsule  Commonly known as: VITAMIN D3  Take 1 capsule (2,000 Units total) by mouth once daily.     clobetasol 0.05% 0.05 % Oint  Commonly known as: TEMOVATE  AAA bid avoid face/groin     EScitalopram oxalate 20 MG tablet  Commonly known as: LEXAPRO  TAKE 1.5 TABLETS (30 MG TOTAL) BY MOUTH ONCE DAILY.     hydroCHLOROthiazide 12.5 MG Tab  Take 1 tablet (12.5 mg total) by mouth once daily.     hydrOXYzine HCL 10 MG Tab  Commonly known as: ATARAX  TAKE 1 TABLET (10 MG TOTAL) BY MOUTH 3 (THREE) TIMES DAILY AS NEEDED (ANXIETY).     mupirocin 2 % ointment  Commonly known as: BACTROBAN  APPLY TOPICALLY TWICE DAILY TO NARES. USE CLEAN SWAB EACH NARE, EACH TIME, FOR 7 DAYS     progesterone 100 MG capsule  Commonly known as: PROMETRIUM  TAKE ONE BY MOUTH NIGHTLY     valACYclovir 1000 MG tablet  Commonly known as: VALTREX  TAKE (1/2) HALF BY MOUTH EVERY 12 HOURS PRN     valsartan 320 MG tablet  Commonly known as: DIOVAN  Take 1 tablet (320 mg total) by mouth once daily.            STOP taking these medications      traZODone 50 MG tablet  Commonly known as: DESYREL            ASK your  doctor about these medications      BOOSTRIX TDAP 2.5-8-5 Lf-mcg-Lf/0.5mL Syrg injection  Generic drug: diphth,pertus(acell),tetanus  Inject into the muscle.     triamcinolone acetonide 0.1% 0.1 % ointment  Commonly known as: KENALOG  SMARTSI Topical Daily PRN                  Time spent on the discharge of patient: 35 minutes         Cece Romero MD  Department of Hospital Medicine  [facility] - Med Surg ([address])

## 2025-02-11 NOTE — PLAN OF CARE
Pt lying in bed. Assessment completed; no pain or distress reported. Call light within reach and bed lowered.

## 2025-02-12 ENCOUNTER — PATIENT OUTREACH (OUTPATIENT)
Dept: ADMINISTRATIVE | Facility: CLINIC | Age: 65
End: 2025-02-12
Payer: MEDICARE

## 2025-02-12 NOTE — PROGRESS NOTES
C3 nurse spoke with Odilia Winslow for a TCC post hospital discharge follow up call. The patient has a scheduled [facility] appointment with Annamarie Edwards MD on 02/17/25 @ 8609.

## 2025-02-17 ENCOUNTER — OFFICE VISIT (OUTPATIENT)
Dept: INTERNAL MEDICINE | Facility: CLINIC | Age: 65
End: 2025-02-17
Payer: MEDICARE

## 2025-02-17 ENCOUNTER — PATIENT MESSAGE (OUTPATIENT)
Dept: INTERNAL MEDICINE | Facility: CLINIC | Age: 65
End: 2025-02-17

## 2025-02-17 VITALS
HEIGHT: 67 IN | SYSTOLIC BLOOD PRESSURE: 142 MMHG | OXYGEN SATURATION: 98 % | WEIGHT: 167.56 LBS | BODY MASS INDEX: 26.3 KG/M2 | DIASTOLIC BLOOD PRESSURE: 90 MMHG | HEART RATE: 95 BPM

## 2025-02-17 DIAGNOSIS — I10 PRIMARY HYPERTENSION: ICD-10-CM

## 2025-02-17 DIAGNOSIS — F41.1 GAD (GENERALIZED ANXIETY DISORDER): ICD-10-CM

## 2025-02-17 DIAGNOSIS — Z12.12 SCREENING FOR COLORECTAL CANCER: ICD-10-CM

## 2025-02-17 DIAGNOSIS — F33.2 SEVERE EPISODE OF RECURRENT MAJOR DEPRESSIVE DISORDER, WITHOUT PSYCHOTIC FEATURES: ICD-10-CM

## 2025-02-17 DIAGNOSIS — Z12.11 SCREENING FOR COLORECTAL CANCER: ICD-10-CM

## 2025-02-17 DIAGNOSIS — Z09 HOSPITAL DISCHARGE FOLLOW-UP: Primary | ICD-10-CM

## 2025-02-17 RX ORDER — BUPROPION HYDROCHLORIDE 150 MG/1
150 TABLET ORAL DAILY
Qty: 90 TABLET | Refills: 0 | Status: SHIPPED | OUTPATIENT
Start: 2025-02-17

## 2025-02-17 RX ORDER — LANOLIN ALCOHOL/MO/W.PET/CERES
1000 CREAM (GRAM) TOPICAL DAILY
COMMUNITY

## 2025-02-17 RX ORDER — VALSARTAN AND HYDROCHLOROTHIAZIDE 320; 25 MG/1; MG/1
1 TABLET, FILM COATED ORAL DAILY
Qty: 90 TABLET | Refills: 3 | Status: SHIPPED | OUTPATIENT
Start: 2025-02-17 | End: 2026-02-17

## 2025-02-17 NOTE — PATIENT INSTRUCTIONS
If you're interested in the Intensive Outpatient Psychiatry Program at [facility], call this number [phone], and select option #3.     Therapy/Psychology:   You may also call your insurance to find providers who are covered in your area.    Call to schedule with [facility] psychology: [phone] or [phone]    [facility] Behavioral   Phone: [phone]    Cognitive Behavioral Therapy (CBT) Center [facility]   Address: [facility] [place] [address]   Phone: [phone]   [email or web address]     Integrated [facility] [address], [address]   Phone: [phone]   You can email for an appointment at: [email or web address]     [facility] Professional Counseling   [address] [place] [address], [facility], [address]   [email or web address]   Dr. Lainey Chaudhari, [phone] or [email or web address]   Dr. Ashli Jin, [phone] or [email or web address]     Cathy Abreu    LCSW (therapist) [phone]   [address] [place] [address]   Janessa Hall   LCSW (therapist) [phone]   [address] [facility]   Patricia Lomas  LCSW (therapist) [phone]   [address] [place] [address]   FRANSICOSandra Sapp          LCSW (therapist) [phone]   Hesham Roberson [phone] (therapist) [facility]   Oniel Hall (therapist) [phone]  [facility] Lissa Mora (therapist) [phone] [facility]     Behavior Health Counseling [phone]   [facility] [place] [address]     Online Therapist:     [email or web address]   [email or web address]     Free Guided Meditations   [email or web address]   [email or web address]   [email or web address]

## 2025-02-17 NOTE — PROGRESS NOTES
Subjective:       Patient ID: Odilia Winslow is a 64 y.o. female.    Chief Complaint: Hospital discharge follow-up [Z09]    Patient is established with me, here today for the following:    History of Present Illness      SUICIDE ATTEMPT:  She reports recent hospitalization for intentional Trazodone overdose where she ingested 12 pills and informed her  15 minutes after ingestion. During hospitalization, EKG monitoring was performed and she received psychiatric consultation via telemedicine. She has scheduled follow-up with psychiatrist on the 5th of next month.    DEPRESSION AND ANXIETY:  She reports worsening depression over the past year, with symptoms becoming more severe in recent months. She initially started Lexapro when experiencing family stressors causing anxiety symptoms. She denies history of seizures.    SUBSTANCE USE:  She reports daily alcohol consumption of a few beers in the evening, with one episode of excessive alcohol intake prior to suicide attempt.    CURRENT MEDICATIONS:  She is currently taking valsartan and hydrochlorothiazide for blood pressure management, and Lexapro for depression.      ROS:  Psychiatric: +anxiety, +depression        Taking Lexapro 30 mg for anxiety    Admitted 09FEB2025 after taking 12 trazodone tablets in attempted suicide  Discharged 11FEB2025  Has psychiatry appointment 05MAR2025    HTN -   Currently prescribed valsartan, HCTZ.  Lab Results   Component Value Date    MICALBCREAT 14.7 08/22/2024     BP Readings from Last 5 Encounters:   02/11/25 (!) 167/76   11/21/24 (!) 159/95   08/22/24 132/78   05/29/24 (!) 148/84   08/14/23 130/80      Transitional Care Note    Family and/or Caretaker present at visit?  No.  Diagnostic tests reviewed/disposition: I have reviewed all completed as well as pending diagnostic tests at the time of discharge.  Disease/illness education: yes  Home health/community services discussion/referrals: Patient does not have home health  "established from hospital visit.  They do not need home health.  If needed, we will set up home health for the patient.   Establishment or re-establishment of referral orders for community resources:  psychiatry, psychology, counseling .   Discussion with other health care providers: No discussion with other health care providers necessary.         Current Outpatient Medications   Medication Instructions    atorvastatin (LIPITOR) 80 mg, Oral, Nightly    buPROPion (WELLBUTRIN XL) 150 mg, Oral, Daily    CALCIUM ORAL Daily    cholecalciferol (vitamin D3) (VITAMIN D3) 2,000 Units, Oral, Daily    clobetasol 0.05% (TEMOVATE) 0.05 % Oint AAA bid avoid face/groin    cyanocobalamin (VITAMIN B-12) 1,000 mcg, Daily    EScitalopram oxalate (LEXAPRO) 30 mg, Oral, Daily    hydrOXYzine HCL (ATARAX) 10 mg, Oral, 3 times daily PRN    progesterone (PROMETRIUM) 100 MG capsule TAKE ONE BY MOUTH NIGHTLY    valACYclovir (VALTREX) 1000 MG tablet TAKE (1/2) HALF BY MOUTH EVERY 12 HOURS PRN    valsartan-hydrochlorothiazide (DIOVAN-HCT) 320-25 mg per tablet 1 tablet, Oral, Daily     Objective:      Vitals:    02/17/25 1136   BP: (!) 142/90   Pulse: 95   SpO2: 98%   Weight: 76 kg (167 lb 8.8 oz)   Height: 5' 7" (1.702 m)   PainSc: 0-No pain     Body mass index is 26.24 kg/m².    Physical Exam  Vitals reviewed.   Constitutional:       General: She is not in acute distress.     Appearance: She is not ill-appearing or diaphoretic.   Pulmonary:      Effort: Pulmonary effort is normal.   Skin:     General: Skin is warm and dry.   Neurological:      Mental Status: She is alert. Mental status is at baseline.   Psychiatric:         Mood and Affect: Mood normal.         Behavior: Behavior normal.         Assessment:       1. Hospital discharge follow-up    2. CLAY (generalized anxiety disorder)    3. Severe episode of recurrent major depressive disorder, without psychotic features    4. Screening for colorectal cancer    5. Primary hypertension      "   Plan:   Hospital discharge follow-up    CLAY (generalized anxiety disorder)  -     Ambulatory referral/consult to Psychology; Future; Expected date: 02/24/2025  -     Ambulatory referral/consult to Psychiatry; Future; Expected date: 02/24/2025  -     buPROPion (WELLBUTRIN XL) 150 MG TB24 tablet; Take 1 tablet (150 mg total) by mouth once daily.  Dispense: 90 tablet; Refill: 0    Severe episode of recurrent major depressive disorder, without psychotic features  -     Ambulatory referral/consult to Psychology; Future; Expected date: 02/24/2025  -     Ambulatory referral/consult to Psychiatry; Future; Expected date: 02/24/2025  -     buPROPion (WELLBUTRIN XL) 150 MG TB24 tablet; Take 1 tablet (150 mg total) by mouth once daily.  Dispense: 90 tablet; Refill: 0    Screening for colorectal cancer  -     Ambulatory referral/consult to Endo Procedure ; Future; Expected date: 02/18/2025    Primary hypertension  -     valsartan-hydrochlorothiazide (DIOVAN-HCT) 320-25 mg per tablet; Take 1 tablet by mouth once daily.  Dispense: 90 tablet; Refill: 3      Assessment & Plan    IMPRESSION:  - Assessed recent hospitalization for Trazodone overdose and suicidal ideation  - Evaluated current Lexapro regimen for depression/anxiety, determining it may be insufficient  - Considered adding Wellbutrin to augment antidepressant effect, given predominant depressive symptoms  - Planned to increase hydrochlorothiazide dose in combination tablet to better control hypertension    DEPRESSION:  - Discussed Wellbutrin's effects and potential to improve motivation.  - Initiated Wellbutrin for depression.  - Continued Lexapro 30 mg daily.  - Referred the patient to [facility] for psychiatric care and counseling.  - Provided referrals for outpatient therapists, including options within and outside [facility] system.  - Follow-up scheduled with psychiatrist on the 5th of next month.  - Scheduled follow-up visit in  April, after psychiatric appointment in March.  - Recommend intensive outpatient program for quicker medication adjustment and frequent check-ins.    SUICIDAL IDEATION:  - Noted recent hospitalization for intentional overdose of 12 Trazodone.  - Assessed that suicidal ideation passed rapidly.  - Acknowledged the incident as a result of overwhelming stress.  - Recommend intensive outpatient program for better support and monitoring.  - Denies SI currently.     ANXIETY:  - Continued Lexapro 30 mg daily for anxiety management.    HYPERTENSION:  - Discontinued separate valsartan and hydrochlorothiazide tablets.  - Initiated combination valsartan/hydrochlorothiazide tablet with increased hydrochlorothiazide dose.  - Noted blood pressure improvement from 142/90 to 124/60, but still not at target.  - Considered stress as a contributing factor to elevated blood pressure.    ALCOHOL USE:  - Explained risks of sudden alcohol cessation, including potential for withdrawal seizures.  - Advised on interplay between depression and alcohol use.  - Instructed the patient to gradually reduce alcohol intake: Decrease from 3 to 2 beers per night for 1 week, then 2 to 1 per night the following week, then to 5 nights a week, then 3 nights a week.  - Assessed alcohol use as potentially exacerbating depression.  - Discussed the relationship between depression and alcohol use.  - Mentioned naltrexone as a potential treatment for alcohol cravings if needed.    FAMILY STRESS:  - Noted family history of son with schizophrenia bipolar type.  - Acknowledged stress from caring for  with cancer and cognitive issues.  - Recognized the burden of caregiving on the patient.  - Encouraged the patient to accept help from her children.  - Recommend counseling and support services.    FINANCIAL STRESS:  - Noted financial stress due to 's illness and inability to work.  - Acknowledged financial stress as a contributing factor to patient's  overall condition.  - Provided information on sliding scale payment options for therapy.    OTHER INSTRUCTIONS:  - Odilia to continue regular exercise regimen.    FOLLOW UP:  - Option for virtual follow-up visit offered.         Annamarie Edwards MD  2/17/2025

## 2025-02-20 ENCOUNTER — PATIENT OUTREACH (OUTPATIENT)
Dept: ADMINISTRATIVE | Facility: OTHER | Age: 65
End: 2025-02-20
Payer: MEDICARE

## 2025-02-20 NOTE — PROGRESS NOTES
CHW - Initial Contact    This Community Health Worker completed OR updated the Social Determinant of Health questionnaire with patient via telephone today.    Pt identified barriers of most importance are: Pt is requesting assistance with medical bill. Sending pt [facility] Financial Assistance application.    Referrals to community agencies completed with patient/caregiver consent outside of [facility] include: yes  Referrals were put through [facility] - no:   Support and Services: Financial Aid/Education  Other information discussed the patient needs / wants help with: SDOH   Follow up required: yes  Follow-up Outreach - Due: 3/10/2025

## 2025-02-22 DIAGNOSIS — Z00.00 ENCOUNTER FOR MEDICARE ANNUAL WELLNESS EXAM: ICD-10-CM

## 2025-02-23 ENCOUNTER — PATIENT MESSAGE (OUTPATIENT)
Dept: INTERNAL MEDICINE | Facility: CLINIC | Age: 65
End: 2025-02-23
Payer: MEDICARE

## 2025-02-26 NOTE — PROGRESS NOTES
"Subjective:   Patient ID: Odilia Winslow is a 59 y.o. female  Chief complaint:   Chief Complaint   Patient presents with    Annual Exam       HPI  Here for annual exam   Had parathyroidectomy and calcium and pth wnl 4/2018 - followed by endocrine     Vit d def: taking vit d and level improved 55 <-16  Exercising reg      Gerd: given ppi by GI specialist for hx of gerd and esophageal stricture s/p dilatation - started taking otc prilosec which is effective   - has not tried zantac    - no dysphagia     HTN:   - took amlodipine and valsartan 160  - inc stress with health issues with children -s on recently dx with bipolar and has 2nd son with this dx also   Son's GF dx with pulm MAC  - not checking bp at home     HLD: taking lipitor daily - tolerating     Anxiety: controlled on ssri but is interested in counseling to help with coping strategies for stressors above   No depression, si/hi/ford    Review of Systems    Objective:  Vitals:    07/16/19 1555 07/16/19 1639   BP: (!) 146/80 (!) 140/80   Pulse: 85    SpO2: 98%    Weight: 79.3 kg (174 lb 13.2 oz)    Height: 5' 7" (1.702 m)      Body mass index is 27.38 kg/m².    Physical Exam   Constitutional: She is oriented to person, place, and time. She appears well-developed and well-nourished.   Tearful today    HENT:   Head: Normocephalic and atraumatic.   Right Ear: External ear normal.   Left Ear: External ear normal.   Nose: Nose normal.   Mouth/Throat: Oropharynx is clear and moist. No oropharyngeal exudate.   Eyes: Conjunctivae and EOM are normal.   Neck: Neck supple. No thyromegaly present.   Cardiovascular: Normal rate, regular rhythm, normal heart sounds and intact distal pulses.   Pulmonary/Chest: Effort normal and breath sounds normal.   Abdominal: Soft. Bowel sounds are normal.   Musculoskeletal: She exhibits no edema or tenderness.   Lymphadenopathy:     She has no cervical adenopathy.   Neurological: She is alert and oriented to person, place, and time. "   Skin: Skin is warm and dry.   Psychiatric: Her behavior is normal. Thought content normal.   Vitals reviewed.      Assessment:  1. Annual physical exam    2. History of parathyroid surgery    3. HTN (hypertension), benign    4. Hyperlipidemia, unspecified hyperlipidemia type    5. Gastroesophageal reflux disease, esophagitis presence not specified    6. Osteopenia of multiple sites    7. Vitamin D deficiency    8. CLAY (generalized anxiety disorder)    9. Carotid atherosclerosis, unspecified laterality        Plan:  Odilia was seen today for annual exam.    Diagnoses and all orders for this visit:    History of parathyroid surgery    HTN (hypertension), benign  Above goal today   rtc in 2 weeks for bp check   Stressed today   Inc CCB if still above goal at [place]    Hyperlipidemia, unspecified hyperlipidemia type  Stable, cont statin   Check labs     Gastroesophageal reflux disease, esophagitis presence not specified  Cont ppi - sx controlled   Cont diet restrictions    Osteopenia of multiple sites  -     DXA Bone Density Spine And Hip; Future  Cont vit d  Calcium rich foods     Vitamin D deficiency  Cont vit d  Check level     CLAY (generalized anxiety disorder)  -     Ambulatory Referral to Psychology  Cont ssri   Add counseling     Annual physical exam  -     Vitamin D; Future  -     Hemoglobin A1c; Future  -     TSH; Future  -     Lipid panel; Future  -     CBC auto differential; Future  -     Comprehensive metabolic panel; Future  Recommend daily sunscreen, cardiovascular exercise min 30 min 5 days per week. Seatbelts routinely.    Carotid atherosclerosis, unspecified laterality  -     US Carotid Bilateral; Future  Cont statin     Other orders  -     amLODIPine (NORVASC) 5 MG tablet; Take 1 tablet (5 mg total) by mouth once daily.  -     atorvastatin (LIPITOR) 20 MG tablet; Take 1 tablet (20 mg total) by mouth once daily.  -     valsartan (DIOVAN) 160 MG tablet; Take 1 tablet (160 mg total) by mouth every  morning.  -     escitalopram oxalate (LEXAPRO) 20 MG tablet; Take 1 tablet (20 mg total) by mouth once daily.        Health Maintenance   Topic Date Due    Aspirin/Antiplatelet Therapy  03/25/1976    Influenza Vaccine  08/01/2019    Mammogram  12/12/2020    Colonoscopy  09/20/2022    Pap Smear with HPV Cotest  10/25/2023    Lipid Panel  01/21/2024    TETANUS VACCINE  05/04/2026    Hepatitis C Screening  Completed           Labor

## 2025-03-10 ENCOUNTER — TELEPHONE (OUTPATIENT)
Dept: INTERNAL MEDICINE | Facility: CLINIC | Age: 65
End: 2025-03-10
Payer: MEDICARE

## 2025-03-10 ENCOUNTER — PATIENT OUTREACH (OUTPATIENT)
Dept: ADMINISTRATIVE | Facility: OTHER | Age: 65
End: 2025-03-10
Payer: MEDICARE

## 2025-03-10 NOTE — TELEPHONE ENCOUNTER
----- Message from Parveen sent at 3/10/2025  9:14 AM CDT -----  Who called:self  What is the request in detail:pt would like for you to give her a call Can the clinic reply by [facility]? Would the patient rather a call back or a response via [facility]?callback  Best call back number:[phone] Additional Information:

## 2025-03-10 NOTE — PROGRESS NOTES
CHW - Case Closure    This Community Health Worker spoke to patient via telephone today.   Pt/Caregiver reported: Pt stated she had not received the financial assistance application that I mailed to her. She didn't have [address] as part of her address at that time. Sent a link in my chart so pt can access financial assistance application.   Pt/Caregiver denied any additional needs at this time and agrees with episode closure at this time.  Provided patient with Community Health Worker's contact information and encouraged him/her to contact this Community Health Worker if additional needs arise.

## 2025-03-11 NOTE — TELEPHONE ENCOUNTER
Please assist visit asap to discuss medication side effects, ok for virtual as well with myself or VIRGIE Conner, thank you!

## 2025-03-11 NOTE — PROGRESS NOTES
The patient location is:  [place]  The chief complaint leading to consultation is:  Medication Problem (Pt would like to discuss BP meds )    Subjective:      Odilia Winslow is a 64 y.o.  female with history of CLAY, HTN, HLD, carotid & aortic atherosclerosis, fibroids, MRSA, vit D deficiency, hx parathyroid surgery, GERD, IBS, osteopenia who presents for Medication Problem (Pt would like to discuss BP meds )  .   History of Present Illness    CHIEF COMPLAINT:  Patient presents today for adverse effects from new blood pressure medication    SYNCOPAL EPISODE AND ASSOCIATED SYMPTOMS:  At last visit with PCP 2/17/25 switched from separate valsartan and HCTZ to combo pill with increased HCTZ dosage. First two days, she took the medication in the evening leading to frequent urination during the night and disrupted sleep with headaches. Switched to AM dosing and had improvement. Thursday, 3/6/25, she had constipation for two days leading to painful bowel movements. This was followed by diarrhea with bowel incontinence. She began experiencing weakness with increased urination.   She experienced a syncopal episode on Saturday morning, 3/8/25, preceded by weakness and lightheadedness, particularly upon standing. During the episode, she fell but landed in mud and denies head injury. She also experienced abdominal pain and headache but denies fever. All symptoms have since resolved. She denies any headaches, gait abnormalities, slurring speech, cognitive dysfunction, fevers.    Last reported blood pressures:  144/88  125/88  124/79  136/85 - yesterday    MEDICATIONS:  She currently takes Diovan 320-25 and Lexapro. A previous trial of amlodipine resulted in leg swelling.    ROS:  General: -fever, -chills, -fatigue, -weight gain, -weight loss  Eyes: -vision changes, -redness, -discharge  ENT: -ear pain, -nasal congestion, -sore throat  Cardiovascular: -chest pain, -palpitations, -lower extremity edema  Respiratory:  "-cough, -shortness of breath  Gastrointestinal: -abdominal pain, -nausea, -vomiting, +diarrhea, +constipation, -blood in stool  Genitourinary: -dysuria, -hematuria, -frequency  Musculoskeletal: -joint pain, -muscle pain  Skin: -rash, -lesion  Neurological: +headache, -dizziness, -numbness, -tingling, +weakness, +lightheadedness  Psychiatric: -anxiety, -depression, -sleep difficulty          No problems updated.    Past Medical History:   Diagnosis Date    Anxiety     Esophageal stricture     GERD (gastroesophageal reflux disease)     H/O cold sores     Hyperlipidemia     Insomnia     Irritable bowel syndrome     Postmenopausal bleeding        Past Surgical History:   Procedure Laterality Date    DILATION AND CURETTAGE OF UTERUS  2007, 2013    ECTOPIC PREGNANCY SURGERY  1986    removal of portion of tube    gerd      KNEE SURGERY Right 2015    arthroscopy    TONSILLECTOMY         Family History   Problem Relation Name Age of Onset    Heart disease Mother Chandana         cad s/p stent    Hypertension Mother Tonjaqueline     Cancer Mother Tonjaqueline         MDS    Mental illness Mother Chandana     Diabetes Father Hug     Cancer Father Hug         lung, + tobacco    Breast cancer Maternal Aunt      No Known Problems Sister      Cancer Brother Jose         lung, + tobacco    Thyroid disease Daughter      Bipolar disorder Son Matty and Herve     Mental illness Son Anabel     No Known Problems Sister      No Known Problems Brother      No Known Problems Son      Mental illness Sister Betsey     Colon cancer Neg Hx      Ovarian cancer Neg Hx         Social History[1]    Objective:   Height 5' 7" (1.702 m), weight 77.1 kg (170 lb), last menstrual period 07/26/2016.     Physical Exam  Constitutional:       General: She is not in acute distress.     Appearance: Normal appearance. She is not ill-appearing, toxic-appearing or diaphoretic.   HENT:      Head: Normocephalic and atraumatic.   Neurological:      Mental Status: She is " "alert. Mental status is at baseline.   Psychiatric:         Mood and Affect: Mood normal.         Behavior: Behavior normal.         Thought Content: Thought content normal.         Judgment: Judgment normal.           Prior labs reviewed  Assessment/Plan:        Odilia Tong" was seen today for medication problem.    IMPRESSION:  - Suspected diarrhea may be due to constipation, viral gastroenteritis, or medication-related effects  - Assessed blood pressure readings, noting fluctuation between target and above-target levels  - Evaluated potential interaction between hydrochlorothiazide and Lexapro affecting sodium levels  - Considered switching hydrochlorothiazide to a different medication in same class to reduce diuretic effects  - Plan to consult with patient's lead physician regarding medication changes    Diagnoses and all orders for this visit:    HTN (hypertension), benign  - Discussed blood pressure medication adjustments may take a few weeks to show full effect.  - Patient to check blood pressure with medication changes and with any symptoms with instructions to record this data.  - Continued current regimen given symptoms have completely resolved and unclear of their relationship to the medication. Patient expressed understanding and is agreeable to the plan.   - Checking blood work to determine any continued electrolyte imbalances.  -     Comprehensive Metabolic Panel; Future  -     CBC Auto Differential; Future  -     Magnesium; Future    Syncope, unspecified syncope type  No concerning features of CNS involvement to warrant immediate or emergent evaluation. Patient should observe and alert the clinic for any changes in symptoms.   -     Comprehensive Metabolic Panel; Future  -     CBC Auto Differential; Future    FOLLOW-UP CARE:  - Follow up in 4 weeks to reassess blood pressure and symptoms.  - Contact the office through the patient portal for final medication plan after consultation with lead " physician.          Visit type: Virtual visit with synchronous audio and video    Total time spent with patient: 22 minutes    Each patient to whom he or she provides medical services by telemedicine is:  (1) informed of the relationship between the physician and patient and the respective role of any other health care provider with respect to management of the patient; and (2) notified that he or she may decline to receive medical services by telemedicine and may withdraw from such care at any time.  Medication List with Changes/Refills   Current Medications    ATORVASTATIN (LIPITOR) 80 MG TABLET    Take 1 tablet (80 mg total) by mouth every evening.    BUPROPION (WELLBUTRIN XL) 150 MG TB24 TABLET    Take 1 tablet (150 mg total) by mouth once daily.    CALCIUM ORAL    Take by mouth Daily.    CHOLECALCIFEROL, VITAMIN D3, (VITAMIN D3) 2,000 UNIT CAP    Take 1 capsule (2,000 Units total) by mouth once daily.    CLOBETASOL 0.05% (TEMOVATE) 0.05 % OINT    AAA bid avoid face/groin    CYANOCOBALAMIN (VITAMIN B-12) 1000 MCG TABLET    Take 1,000 mcg by mouth once daily.    ESCITALOPRAM OXALATE (LEXAPRO) 20 MG TABLET    TAKE 1.5 TABLETS (30 MG TOTAL) BY MOUTH ONCE DAILY.    HYDROXYZINE HCL (ATARAX) 10 MG TAB    TAKE 1 TABLET (10 MG TOTAL) BY MOUTH 3 (THREE) TIMES DAILY AS NEEDED (ANXIETY).    PROGESTERONE (PROMETRIUM) 100 MG CAPSULE    TAKE ONE BY MOUTH NIGHTLY    VALACYCLOVIR (VALTREX) 1000 MG TABLET    TAKE (1/2) HALF BY MOUTH EVERY 12 HOURS PRN    VALSARTAN-HYDROCHLOROTHIAZIDE (DIOVAN-HCT) 320-25 MG PER TABLET    Take 1 tablet by mouth once daily.               [1]   Social History  Tobacco Use    Smoking status: Never    Smokeless tobacco: Never   Substance Use Topics    Alcohol use: Yes     Comment: occasional    Drug use: No

## 2025-03-12 ENCOUNTER — LAB VISIT (OUTPATIENT)
Dept: LAB | Facility: HOSPITAL | Age: 65
End: 2025-03-12
Payer: MEDICARE

## 2025-03-12 ENCOUNTER — OFFICE VISIT (OUTPATIENT)
Dept: INTERNAL MEDICINE | Facility: CLINIC | Age: 65
End: 2025-03-12
Payer: MEDICARE

## 2025-03-12 ENCOUNTER — OFFICE VISIT (OUTPATIENT)
Dept: PSYCHIATRY | Facility: CLINIC | Age: 65
End: 2025-03-12
Payer: MEDICARE

## 2025-03-12 ENCOUNTER — RESULTS FOLLOW-UP (OUTPATIENT)
Dept: INTERNAL MEDICINE | Facility: CLINIC | Age: 65
End: 2025-03-12

## 2025-03-12 VITALS
HEART RATE: 85 BPM | DIASTOLIC BLOOD PRESSURE: 81 MMHG | WEIGHT: 169.75 LBS | SYSTOLIC BLOOD PRESSURE: 139 MMHG | BODY MASS INDEX: 26.59 KG/M2

## 2025-03-12 VITALS — HEIGHT: 67 IN | WEIGHT: 170 LBS | BODY MASS INDEX: 26.68 KG/M2

## 2025-03-12 DIAGNOSIS — Z63.6 CAREGIVER BURDEN: Primary | ICD-10-CM

## 2025-03-12 DIAGNOSIS — T14.91XA SUICIDE ATTEMPT: ICD-10-CM

## 2025-03-12 DIAGNOSIS — I10 HTN (HYPERTENSION), BENIGN: Primary | ICD-10-CM

## 2025-03-12 DIAGNOSIS — E87.1 HYPONATREMIA: Primary | ICD-10-CM

## 2025-03-12 DIAGNOSIS — I10 PRIMARY HYPERTENSION: ICD-10-CM

## 2025-03-12 DIAGNOSIS — R19.7 DIARRHEA, UNSPECIFIED TYPE: ICD-10-CM

## 2025-03-12 DIAGNOSIS — R55 SYNCOPE, UNSPECIFIED SYNCOPE TYPE: ICD-10-CM

## 2025-03-12 DIAGNOSIS — F33.2 SEVERE EPISODE OF RECURRENT MAJOR DEPRESSIVE DISORDER, WITHOUT PSYCHOTIC FEATURES: ICD-10-CM

## 2025-03-12 DIAGNOSIS — F41.1 GAD (GENERALIZED ANXIETY DISORDER): ICD-10-CM

## 2025-03-12 DIAGNOSIS — I10 HTN (HYPERTENSION), BENIGN: ICD-10-CM

## 2025-03-12 LAB
ALBUMIN SERPL BCP-MCNC: 3.7 G/DL (ref 3.5–5.2)
ALP SERPL-CCNC: 80 U/L (ref 40–150)
ALT SERPL W/O P-5'-P-CCNC: 20 U/L (ref 10–44)
ANION GAP SERPL CALC-SCNC: 9 MMOL/L (ref 8–16)
AST SERPL-CCNC: 27 U/L (ref 10–40)
BASOPHILS # BLD AUTO: 0.04 K/UL (ref 0–0.2)
BASOPHILS NFR BLD: 0.6 % (ref 0–1.9)
BILIRUB SERPL-MCNC: 0.4 MG/DL (ref 0.1–1)
BUN SERPL-MCNC: 7 MG/DL (ref 8–23)
CALCIUM SERPL-MCNC: 9.6 MG/DL (ref 8.7–10.5)
CHLORIDE SERPL-SCNC: 93 MMOL/L (ref 95–110)
CO2 SERPL-SCNC: 26 MMOL/L (ref 23–29)
CREAT SERPL-MCNC: 0.6 MG/DL (ref 0.5–1.4)
DIFFERENTIAL METHOD BLD: ABNORMAL
EOSINOPHIL # BLD AUTO: 0.2 K/UL (ref 0–0.5)
EOSINOPHIL NFR BLD: 2.2 % (ref 0–8)
ERYTHROCYTE [DISTWIDTH] IN BLOOD BY AUTOMATED COUNT: 11.9 % (ref 11.5–14.5)
EST. GFR  (NO RACE VARIABLE): >60 ML/MIN/1.73 M^2
GLUCOSE SERPL-MCNC: 107 MG/DL (ref 70–110)
HCT VFR BLD AUTO: 37.2 % (ref 37–48.5)
HGB BLD-MCNC: 13 G/DL (ref 12–16)
IMM GRANULOCYTES # BLD AUTO: 0.07 K/UL (ref 0–0.04)
IMM GRANULOCYTES NFR BLD AUTO: 1 % (ref 0–0.5)
LYMPHOCYTES # BLD AUTO: 1.4 K/UL (ref 1–4.8)
LYMPHOCYTES NFR BLD: 20.7 % (ref 18–48)
MCH RBC QN AUTO: 32.5 PG (ref 27–31)
MCHC RBC AUTO-ENTMCNC: 34.9 G/DL (ref 32–36)
MCV RBC AUTO: 93 FL (ref 82–98)
MONOCYTES # BLD AUTO: 0.7 K/UL (ref 0.3–1)
MONOCYTES NFR BLD: 9.8 % (ref 4–15)
NEUTROPHILS # BLD AUTO: 4.5 K/UL (ref 1.8–7.7)
NEUTROPHILS NFR BLD: 65.7 % (ref 38–73)
NRBC BLD-RTO: 0 /100 WBC
PLATELET # BLD AUTO: 336 K/UL (ref 150–450)
PMV BLD AUTO: 8.6 FL (ref 9.2–12.9)
POTASSIUM SERPL-SCNC: 4 MMOL/L (ref 3.5–5.1)
PROT SERPL-MCNC: 7.2 G/DL (ref 6–8.4)
RBC # BLD AUTO: 4 M/UL (ref 4–5.4)
SODIUM SERPL-SCNC: 128 MMOL/L (ref 136–145)
WBC # BLD AUTO: 6.91 K/UL (ref 3.9–12.7)

## 2025-03-12 PROCEDURE — 85025 COMPLETE CBC W/AUTO DIFF WBC: CPT | Mod: HCNC | Performed by: COUNSELOR

## 2025-03-12 PROCEDURE — 80053 COMPREHEN METABOLIC PANEL: CPT | Mod: HCNC | Performed by: COUNSELOR

## 2025-03-12 PROCEDURE — 36415 COLL VENOUS BLD VENIPUNCTURE: CPT | Mod: HCNC | Performed by: COUNSELOR

## 2025-03-12 PROCEDURE — 99999 PR PBB SHADOW E&M-EST. PATIENT-LVL III: CPT | Mod: PBBFAC,HCNC,, | Performed by: STUDENT IN AN ORGANIZED HEALTH CARE EDUCATION/TRAINING PROGRAM

## 2025-03-12 RX ORDER — BUPROPION HYDROCHLORIDE 150 MG/1
300 TABLET ORAL DAILY
Qty: 90 TABLET | Refills: 0 | Status: SHIPPED | OUTPATIENT
Start: 2025-03-12

## 2025-03-12 RX ORDER — ESCITALOPRAM OXALATE 20 MG/1
30 TABLET ORAL DAILY
Qty: 135 TABLET | Refills: 3 | Status: SHIPPED | OUTPATIENT
Start: 2025-03-12

## 2025-03-12 SDOH — SOCIAL DETERMINANTS OF HEALTH (SDOH): DEPENDENT RELATIVE NEEDING CARE AT HOME: Z63.6

## 2025-03-12 NOTE — PROGRESS NOTES
[facility]  DEPARTMENT OF PSYCHIATRY    INITIAL PSYCHIATRIC EVALUATION  Outpatient Clinic    EXAMINING PRACTITIONER: Misa MCDONALD MD      KEY:     I[]I Y = II[x][]II = Yes / Present / Present Though Denies / Endorses  I[]I N = II[][x]II = No / Absent / Absent Though Endorses / Denies  I[]I U = II[][]II = Unknown / Unable to Assess/Enact / Unwilling to Participate/Provide  I[]I A = II[x][x]II = Ambiguity / Uncertainty of Accuracy Exists  I[]I D = Denial or Minimization is Suspected/Evident  I[]I N/A = Non-Applicable      CHIEF COMPLAINT:     Patient Name: Odilia Winslow  YOB: 1960  MRN: [id number]    Odilia Winslow is a 64 y.o. female who is being seen by me for an initial psychiatric evaluation.  Odilia Winslow presents with the chief complaint of: No chief complaint on file.    CHART REVIEW:     Available documentation has been reviewed, and pertinent elements of the chart have been incorporated into this evaluation where appropriate.    reviewed      PRESENTATION:     HISTORY OF PRESENT ILLNESS:     Ms. Winslow is a 63 y/o F who presents for initial evaluation. Her  was a  who had a stressful job, found out he had bladder cancer a few years ago. She was caring for her  during this period. Finances got tight and she had to sell the house. 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 Oldest son has bipolar d/o but is now well controlled. Middle son has issues with drug use and depression; he is sober now, lives with his parents. She has had to handle everything; it is an enormous amount of stress. She is under a lot of financial pressure.     Overdosed on trazodone 2/10 and reviewed those notes. She had a few drinks and then impulsively took the medication, she had felt like the prior few days had been relentless stress.     She felt like the overdose was an escape mechanism.   No thoughts of suicide before. She has never  attempted suicide before.     She just started with a therapist.     Can talk to her friend and her sisters.     Medicine:   Lexapro 30 mg  Welbutrin 150 mg XL  No longer takign trazodone     Drinks 2-3 beers a night  No nicotine  No drugs  No cocaine, acid, ecstacy     Used to exercise a lot, now is starting again                 .  REVIEW OF SYSTEMS:     A pertinent medical review of systems was performed.    Review of Systems    II[][x]II pain: **    II[][x]II cardiac symptoms: no   II[][x]II abnormal movements: *no     PSYCHIATRIC ROS:     I[]I Patient denies any pertinent Psychiatric ROS, and none is known.  I[]I Patient unable or unwilling to provide any Psychiatric ROS.    II[][x]II sleep disturbance: sleep   II[x][]II appetite/weight change: some low appetite   II[][]II fatigue/anergia: denies  II[][]II impairment in focus/concentration: **       II[][x]II depression: denies deperssion today  II[x][]II anxiety/worry: worries frequently  II[][x]II dysregulated mood/behavior: *  II[][x]II manic symptomatology: **  II[][x]II psychosis: *    HISTORY:     I[x]I Y  I[]I N  I[]I U  Psychiatric Diagnoses: unspecified depression, CLAY   I[]I Y  I[x]I N  I[]I U  Current Psychiatric Provider (if Applicable):   I[]I Y  I[x]I N  I[]I U  Hx of Psychiatric Hospitalization:   I[]I Y  I[]I N  I[]I U  Hx of Outpatient Psychiatric Treatment (psychiatry/psychotherapy):   I[x]I Y  II N  I[]I U  Psychotropic Trials: just lexapro, trazodone, wellbutrin 150   I[x]I Y  I[]I N  I[]I U  Prior Suicide Attempts: recent  I[x]I Y  I[]I N  I[]I U  Hx of Suicidal Ideation:   I[]I Y  I[x]I N  I[]I U  Hx of Homicidal Ideation:   I[]I Y  I[x]I N  I[]I U  Hx of Self-Injurious Behavior (Non-Suicidal):   I[]I Y  I[x]I N  I[]I U  Hx of Violence:   I[]I Y  I[x]I N  I[]I U  Documented Hx of Malingering:     Additional Relevant Past Psychiatric History, As Applicable:       SUBSTANCE USE:   2-3 beers a night   No nicotine  No CBD/THC    TRAUMA:   No  hx of abuse      FAMILY:   Son with schizoaffective disorder  Son with bipolar disorder    SOCIAL:     I[]I Patient unable or unwilling to provide any social history.    II[x][]II Grew Up Locally?:   II[][x]II Happy Childhood?:   II[][x]II Significant Developmental Delay/Disability?:   II[x][]II GED/High School Dipoloma?:   II[x][]II Post High School Education?:   II[][x]II Currently Employed?:   II[][x]II On or Applying for Disability?:   II[x][]II Functions Independently?:   II[x][]II Financially Stable?:   II[x][]II Domiciled?:   II[][x]II Lives Alone?:   II[x][]II Heterosexual/Cisgender?:   II[x][]II Currently in a Romantic Relationship?:   II[x][]II Ever ?:   II[x][]II Children/Dependents?:   II[x][]II Hindu/Spiritual?:   II[][x]II  History?:   II[x][]II Engaged in Hobbies/Recreational Activities?:   II[][x]II Access to a Gun?:     LEGAL:     I[x]I Patient denies any legal history, and none is known.  I[]I Patient unable or unwilling to provide any legal history.        MEDICAL:     The patient's past medical history has been reviewed and updated as appropriate within the electronic medical record system.    General Medical History:     No major issues    NEUROLOGIC:     I[]I Y  I[x]I N  I[]I U  Hx of Seizure:   I[]I Y  I[x]I N  I[]I U  Hx of Significant Head Trauma (e.g., Loss of Consciousness, Concussion, Coma):      Additional Relevant Neurologic History, As Applicable:       MEDICATIONS:     Current Psychotropic Medications:   Lexapro 30  Wellbutrin 150 mg   Trazodone PRN sleep    ALLERGIES:   Patient has no known allergies.    RISK:     RELIABILITY, ACCURACY & AGENCY:     The patient is deemed to be a reliable and factually accurate historian.    Inconsistencies between patient reporting, collateral information, and/or chart review are absent.    Legal Status: The patient is currently here on a voluntary legal status.    MITIGATION:     Risk Mitigation Strategies, Harm Reduction  Techniques, and Safety Netting are important interventions that can reduce acute and chronic risk, and as such opportunities were sought to incorporate them into the encounter, to the degree possible.  Written material has been provided to supplement, augment, and reinforce any discussions and interventions, via the AVS or other pre-printed handouts.    PRESCRIPTION DRUG MONITORING:     [place]  AWARE  Site reviewed - No recent discrepancies or irregularities are noted.      FIREARMS:     Access to Firearms:   See above for screening on access to firearms.  NOTE: patient counseled on gun safety, including safe storage.  NOTE: patient counseled on inherent risks associated with gun ownership.      III[x]III  RISK PARAMETERS:     The following risk parameters were assessed during this evaluation:    I[]I Y  I[x]I N  I[]I U  I[]I A  Suicidal Ideation/Behavior: **   Patient: Odilia Winslow  MRN: [id number]  Assessment Date: 03/31/2025    Suicide Risk Assessment    Description of any current self-harm thoughts: denies all    Specific Risk Factors:  Psychiatric Illness? Yes   Psychic distress? Yes   Global insomnia? No   Alcohol abuse/dependence? No   Psychiatric hospitalization within the past year? No, came close   Past suicide attempts? If yes, how many and consider lethality of method. Yes - one, by OD   Marked hopelessness? No   Recent loss? No   Acute stressor? Yes   Acute/chronic illness? No   Behavioral change indicating increased intent? No   Family history of suicide attempt? No   Supportive environment? Yes   Has current plan of self-harm? No   Returning to or impending serious half-way? No     Clinical Assessment/Comment regarding imminent suicide risk:  No imminent suicide threat today    Patient has been informed they can call or come in if concerns worsen, or present to the emergency room if in crisis: Yes   I[]I Y  I[x]I N  I[]I U  I[]I A  Homicidal Ideation/Behavior: **  I[]I Y  I[x]I N  I[]I U  I[]I A   "Violence: **  I[]I Y  I[x]I N  I[]I U  I[]I A  Self-Injurious Behavior: **    I[]I Y  I[x]I N  I[]I U  I[]I A  I[]I N/A  Minimization of Symptoms Suspected/Evident: **  I[]I Y  I[x]I N  I[]I U  I[]I A  I[]I N/A  Exaggeration of Symptoms Suspected/Evident: **      III[x]III  CLINICAL RISK ASSESSMENT:      - the patient was able to satisfactorily contract for safety  - the patient was noted to be future oriented     NH-[facility]-Scugxrrcxyyrrms: Currently does not meet or exceed the threshold for psychiatric hospitalization, as the patient can be managed safely and successfully in a less restrictive level of care or the community.    III[x]III  CLINICAL RISK DETERMINATION:     Current risk is judged to be:  I[x]I Low    I[]I Moderate   I[]I High    The following criteria were met for involuntary psychiatric admission:   I[x]I None    I[]I Dangerous to Self    I[]I Dangerous to Others    I[]I Gravely Disabled      EXAMINATION:     VITALS:     /81   Pulse 85   Wt 77 kg (169 lb 12.1 oz)   LMP 07/26/2016   BMI 26.59 kg/m²     MENTAL STATUS EXAMINATION:     Mental Status Exam:  Appearance: well groomed, appears stated age  Behavior/Cooperation: appropriate normal, cooperative  Speech: appropriate rate, volume and tone   Language: uses words appropriately; NO aphasia or dysarthria  Mood: "ok, a little better"  Affect:  congruent with mood and appropriate to situation/content   Thought Process: normal and logical  Thought Content: normal, no suicidality, no homicidality, delusions, or paranoia  Level of Consciousness: Alert and Oriented x3  Memory:  Intact  Attention/concentration: appropriate for age/education.   Fund of Knowledge: appears adequate  Insight:  Intact  Judgment:  Intact      ASSESSMENT:     A diagnostic psychiatric evaluation was performed and responsiveness to treatment was assessed.  The patient demonstrates adequate ability/capacity to respond to treatment.      III[x]III  INITIAL DIAGNOSES AND " PROBLEMS:             .  Major depressive disorder, severe, without psychotic features  Generalized anxiety disorder  Hx of suicide attempt  Caregiver burden    PLAN:     IMPRESSION AND RECOMMENDATIONS:     MANAGEMENT PLAN, TREATMENT GOALS, THERAPEUTIC TECHNIQUES/APPROACHES & CLINICAL REASONING:     - can continue lexapro 30 mg daily. Discussed that this is higher than FDA limits but pt tolerating without issue. Reviewed risk of serotonin syndrome.  - increase wellbutrin to 300 mg XL daily. Reviewed risk including risk of seizure  - advised therapy, placed referral, discussed IOP  - check labowrk  - reviewed 988/911 ER precautions  - rtc 6 weeks or sooner PRN     .  III[x]III  PRESCRIPTION DRUG MANAGEMENT:     Prescription Drug Management entails the review, recommendation, or consideration without recommendation of medications, and as such was employed during the encounter.    Discussed, to the extent possible, diagnosis, risks and benefits of proposed treatment vs alternative treatments vs no treatment, potential side effects of these treatments and the inherent unpredictability of treatment. The patient expresses understanding of the above and displays the capacity to agree with this treatment given said understanding. Patient also agrees that, currently, the benefits outweigh the risks and consents to treatment at this time.     Written material has additionally been provided, via the AVS or other pre-printed handouts.    -- Discussed, to the extent possible, diagnosis, risks and benefits of proposed treatment vs alternative treatments vs no treatment, potential side effects of these treatments and the inherent unpredictability of treatment. The patient's ability to understand, participate and engage in a conversation surrounding this was deemed to be: robust and fully intact. The patient expresses understanding of the above and displays the capacity to agree with this treatment given said understanding. Patient  also agrees that, currently, the benefits outweigh the risks and consents to treatment at this time.            MEDICAL DECISION MAKING:     Shared medical decision making and informed consent are the hallmark and bedrock of good clinical care, and as such have been employed and obtained, respectively, to the degree possible.  Written material has been provided to supplement, augment, and reinforce any discussions and interventions, via the AVS or other pre-printed handouts.    Additional psychoeducation has been provided, as warranted.

## 2025-03-13 ENCOUNTER — PATIENT MESSAGE (OUTPATIENT)
Dept: PSYCHIATRY | Facility: CLINIC | Age: 65
End: 2025-03-13
Payer: MEDICARE

## 2025-03-16 ENCOUNTER — PATIENT MESSAGE (OUTPATIENT)
Dept: PSYCHIATRY | Facility: CLINIC | Age: 65
End: 2025-03-16
Payer: MEDICARE

## 2025-03-20 ENCOUNTER — PATIENT MESSAGE (OUTPATIENT)
Dept: INTERNAL MEDICINE | Facility: CLINIC | Age: 65
End: 2025-03-20
Payer: MEDICARE

## 2025-03-21 ENCOUNTER — OFFICE VISIT (OUTPATIENT)
Dept: INTERNAL MEDICINE | Facility: CLINIC | Age: 65
End: 2025-03-21
Payer: MEDICARE

## 2025-03-21 ENCOUNTER — TELEPHONE (OUTPATIENT)
Dept: INTERNAL MEDICINE | Facility: CLINIC | Age: 65
End: 2025-03-21
Payer: MEDICARE

## 2025-03-21 ENCOUNTER — LAB VISIT (OUTPATIENT)
Dept: LAB | Facility: HOSPITAL | Age: 65
End: 2025-03-21
Payer: MEDICARE

## 2025-03-21 VITALS
BODY MASS INDEX: 26.64 KG/M2 | SYSTOLIC BLOOD PRESSURE: 132 MMHG | HEIGHT: 67 IN | HEART RATE: 85 BPM | WEIGHT: 169.75 LBS | DIASTOLIC BLOOD PRESSURE: 82 MMHG

## 2025-03-21 DIAGNOSIS — F41.1 GAD (GENERALIZED ANXIETY DISORDER): ICD-10-CM

## 2025-03-21 DIAGNOSIS — R51.9 INTRACTABLE EPISODIC HEADACHE, UNSPECIFIED HEADACHE TYPE: ICD-10-CM

## 2025-03-21 DIAGNOSIS — G47.00 INSOMNIA, UNSPECIFIED TYPE: Primary | ICD-10-CM

## 2025-03-21 DIAGNOSIS — R19.7 DIARRHEA, UNSPECIFIED TYPE: ICD-10-CM

## 2025-03-21 DIAGNOSIS — E87.1 HYPONATREMIA: ICD-10-CM

## 2025-03-21 DIAGNOSIS — R55 SYNCOPE, UNSPECIFIED SYNCOPE TYPE: ICD-10-CM

## 2025-03-21 DIAGNOSIS — I10 HTN (HYPERTENSION), BENIGN: ICD-10-CM

## 2025-03-21 DIAGNOSIS — F33.2 SEVERE EPISODE OF RECURRENT MAJOR DEPRESSIVE DISORDER, WITHOUT PSYCHOTIC FEATURES: ICD-10-CM

## 2025-03-21 LAB
ALBUMIN SERPL BCP-MCNC: 4 G/DL (ref 3.5–5.2)
ALP SERPL-CCNC: 96 U/L (ref 40–150)
ALT SERPL W/O P-5'-P-CCNC: 22 U/L (ref 10–44)
ANION GAP SERPL CALC-SCNC: 9 MMOL/L (ref 8–16)
AST SERPL-CCNC: 30 U/L (ref 10–40)
BILIRUB SERPL-MCNC: 0.5 MG/DL (ref 0.1–1)
BUN SERPL-MCNC: 7 MG/DL (ref 8–23)
CALCIUM SERPL-MCNC: 9.7 MG/DL (ref 8.7–10.5)
CHLORIDE SERPL-SCNC: 95 MMOL/L (ref 95–110)
CO2 SERPL-SCNC: 25 MMOL/L (ref 23–29)
CREAT SERPL-MCNC: 0.7 MG/DL (ref 0.5–1.4)
EST. GFR  (NO RACE VARIABLE): >60 ML/MIN/1.73 M^2
GLUCOSE SERPL-MCNC: 119 MG/DL (ref 70–110)
MAGNESIUM SERPL-MCNC: 1.8 MG/DL (ref 1.6–2.6)
POTASSIUM SERPL-SCNC: 3.8 MMOL/L (ref 3.5–5.1)
PROT SERPL-MCNC: 7.4 G/DL (ref 6–8.4)
SODIUM SERPL-SCNC: 129 MMOL/L (ref 136–145)
TSH SERPL DL<=0.005 MIU/L-ACNC: 0.74 UIU/ML (ref 0.4–4)

## 2025-03-21 PROCEDURE — 36415 COLL VENOUS BLD VENIPUNCTURE: CPT | Mod: HCNC,PO | Performed by: COUNSELOR

## 2025-03-21 PROCEDURE — 84443 ASSAY THYROID STIM HORMONE: CPT | Mod: HCNC | Performed by: STUDENT IN AN ORGANIZED HEALTH CARE EDUCATION/TRAINING PROGRAM

## 2025-03-21 PROCEDURE — 83735 ASSAY OF MAGNESIUM: CPT | Mod: HCNC | Performed by: COUNSELOR

## 2025-03-21 PROCEDURE — 80053 COMPREHEN METABOLIC PANEL: CPT | Mod: HCNC | Performed by: COUNSELOR

## 2025-03-21 RX ORDER — IBUPROFEN 800 MG/1
800 TABLET ORAL 3 TIMES DAILY
COMMUNITY
Start: 2024-11-05

## 2025-03-21 NOTE — TELEPHONE ENCOUNTER
Spoke with pt and scheduled her labs at met location      Asked pt would she still like to do her virtual for 11:30 am or come in person pt stated she rather do the virtual.

## 2025-03-21 NOTE — TELEPHONE ENCOUNTER
Wellbutrin and valsartan-HCTZ    Headaches, not sleeping, always tired.     Scheduled appointment to discuss medication

## 2025-03-21 NOTE — PATIENT INSTRUCTIONS
Here are some sleep hygiene tips:  Sleep Hygiene Tips - [place] link  Sleep Hygiene Video - Video with 10 tips you can try.    You can also take 1-2.5 mg of melatonin 2-3 hours before sleep to see if that helps.  You can also try magnesium supplements. Take no more than 350 mg per night. However, keep in mind you can find this naturally in nuts, whole grains, soy products, dairy, and leafy greens.

## 2025-03-21 NOTE — PROGRESS NOTES
The patient location is:  [place], [place]  The chief complaint leading to consultation is:  Medication Review    Subjective:         HPI   Odilia Winslow is a 64 y.o.  female who presents for Medication Review  .   History of Present Illness    CHIEF COMPLAINT:  Patient presents today for follow up regarding sleep issues    SLEEP:  Saw me 3/12/25 for HTN follow up and syncopal episode. She had increased HCTZ dosage 2/17/25 with PCP in combination pill with valsartan. Initially, she took the medication at night. She had frequent urination and disrupted sleep with headaches. Switched to AM dosing with improvement. She started experiencing constipation and painful bowel movements followed by diarrhea and bowel incontinence. 3/8/25 she experienced syncopal episode preceded by diarrhea, weakness, and lightheadedness. She denied head strike. Symptoms resolved spontaneously. Labs revealed hyponatremia of 128. It was unclear whether she had gastroenteritis versus medication adverse effect. Decided to observe for any continuation of symptoms. Patient advised to perform blood work earlier this week to follow hyponatremia.     Psychiatry increased wellbutrin 3/12/25 to 300 mg   Then decreased wellbutrin to 150 mg days ago due to insomnia. Since then, symptoms unchanged.  Characterized by difficulty falling asleep, twists and turns, usually can't get to sleep 4-5 AM. No racing thoughts, feels tired, can't fall asleep.   Associated symptoms include headaches and increased fatigue. Thinks headaches associated with lack of sleep. They will occur for a few hours at most during the evening and day. Has tried ibuprofen, usually helps.   Still having nocturia.  Still not having diarrhea, no dizziness.    Last 3 days BP:  132/82  139/87  132/82    NEW SYMPTOMS:  She reports experiencing intermittent headaches during both day and night, with episodes lasting a few hours. She notes this is unusual for her.    ROS:  General:  -fever, -chills, +fatigue, -weight gain, -weight loss  Eyes: -vision changes, -redness, -discharge  ENT: -ear pain, -nasal congestion, -sore throat  Cardiovascular: -chest pain, -palpitations, -lower extremity edema  Respiratory: -cough, -shortness of breath  Gastrointestinal: -abdominal pain, -nausea, -vomiting, -diarrhea, -constipation, -blood in stool  Genitourinary: -dysuria, -hematuria, -frequency, +nocturia  Musculoskeletal: -joint pain, -muscle pain, +neck pain  Skin: -rash, -lesion  Neurological: +headache, -dizziness, -numbness, -tingling, +difficulty falling asleep  Psychiatric: -anxiety, -depression, +sleep difficulty            Review of Systems   Constitutional:  Negative for activity change and unexpected weight change.   HENT:  Negative for hearing loss, rhinorrhea and trouble swallowing.    Eyes:  Negative for discharge and visual disturbance.   Respiratory:  Negative for chest tightness and wheezing.    Cardiovascular:  Negative for chest pain and palpitations.   Gastrointestinal:  Negative for blood in stool, constipation, diarrhea and vomiting.   Endocrine: Positive for polyuria. Negative for polydipsia.   Genitourinary:  Negative for difficulty urinating, dysuria, hematuria and menstrual problem.   Musculoskeletal:  Positive for neck pain. Negative for arthralgias and joint swelling.   Neurological:  Positive for headaches. Negative for weakness.   Psychiatric/Behavioral:  Negative for confusion and dysphoric mood.        No problems updated.    Past Medical History:   Diagnosis Date    Anxiety     Esophageal stricture     GERD (gastroesophageal reflux disease)     H/O cold sores     Hyperlipidemia     Insomnia     Irritable bowel syndrome     Postmenopausal bleeding        Past Surgical History:   Procedure Laterality Date    DILATION AND CURETTAGE OF UTERUS  2007, 2013    ECTOPIC PREGNANCY SURGERY  1986    removal of portion of tube    gerd      KNEE SURGERY Right 2015    arthroscopy     "TONSILLECTOMY         Family History   Problem Relation Name Age of Onset    Heart disease Mother Chanadna         cad s/p stent    Hypertension Mother Tonjaqueline     Cancer Mother Tonjaqueline         MDS    Mental illness Mother Chandana     Diabetes Father Hug     Cancer Father Hug         lung, + tobacco    Breast cancer Maternal Aunt      No Known Problems Sister      Cancer Brother Jose         lung, + tobacco    Thyroid disease Daughter      Bipolar disorder Son Matty and Herve     Mental illness Son Matty and Herve     No Known Problems Sister      No Known Problems Brother      No Known Problems Son      Mental illness Sister Betsey     Colon cancer Neg Hx      Ovarian cancer Neg Hx         Social History[1]    Objective:   Blood pressure 132/82, pulse 85, height 5' 7" (1.702 m), weight 77 kg (169 lb 12.1 oz), last menstrual period 07/26/2016.     Physical Exam  Constitutional:       General: She is not in acute distress.     Appearance: Normal appearance. She is not ill-appearing.   HENT:      Head: Normocephalic and atraumatic.      Nose: Nose normal.   Eyes:      Extraocular Movements: Extraocular movements intact.      Conjunctiva/sclera: Conjunctivae normal.   Neurological:      Mental Status: She is alert. Mental status is at baseline.   Psychiatric:         Mood and Affect: Mood normal.         Behavior: Behavior normal.         Thought Content: Thought content normal.         Judgment: Judgment normal.           Prior labs reviewed  Assessment/Plan:        Odilia Tong" was seen today for medication review.    Diagnoses and all orders for this visit:    PLAN SUMMARY:  - Prescribed OTC melatonin 1-2.5 mg, taken 1-2 hours before bedtime  - Prescribed OTC magnesium supplement to be taken at night  - Continue hydrochlorothiazide and telmisartan, pending lab results, particularly sodium and Mg. Consider discontinuing HCTZ if sodium persistently low.  - Implement sleep hygiene practices  - Consider morning exercise if " energy permits  - Use ibuprofen for headache relief as needed  - Follow up via patient portal on Wednesday of following week  - Contact office if symptoms do not improve with implemented changes    Insomnia, unspecified type  - Assessed the patient's sleep issues and considered potential causes of insomnia, including recent Wellbutrin dose increase.  - Assessed the patient's sleep habits and patterns, including bedtime and duration of sleeplessness.  - Prescribed OTC magnesium supplement to be taken at night.  - Prescribed OTC melatonin 2-5 mg to be taken 1-2 hours before bedtime.  - Discussed potential side effects of melatonin, including unusual dreams and morning grogginess.  - Informed about possible diarrhea as side effect of magnesium supplements.  - Provided sleep hygiene recommendations, including avoiding screen time, keeping the room cool, and using the bed only for sleeping. Avoiding screen time and blue light 2-3 hours before bed Maintaining a cool bedroom temperature (60-68°F) Using the bed only for sleeping Getting up to do low mentally stimulating activities if unable to fall asleep Patient to implement sleep hygiene practices.  - Recommend considering morning exercise if energy levels permit.    HTN (hypertension), benign  - Assessed blood pressure management.  - Evaluated that the blood pressure might still be a little high.  - Continued hydrochlorothiazide and telmisartan, pending lab results.  - Plan to reassess blood pressure treatment if sodium levels are consistently low.  - Could trial nifedipine given edema related to amlodipine.    Intractable episodic headache, unspecified headache type  - Assessed the patient's headaches.  - Evaluated that the headaches might be associated with lack of sleep or possibly low sodium levels.  - Patient reports experiencing headaches off and on at night and during the day, lasting for a few hours.  - Recommend using ibuprofen for headache relief, confirming it  does not interfere with other medications.    Hyponatremia  - Plan to review lab results to check sodium levels and their potential relation to headaches.  - Evaluated that the absence of dizziness is a good sign regarding sodium levels.  - Awaiting lab results, particularly sodium and magnesium levels, to guide further treatment decisions and potential medication adjustments.    KNEE PAIN:  - Confirmed that ibuprofen use for knee pain is acceptable.  - Patient mentions taking ibuprofen for knee pain.    FOLLOW-UP:  - Evaluated urination frequency, ruling out UTI as potential cause.  - Contact the office if symptoms do not improve with implemented changes.  - Follow up via patient portal on Wednesday of following week.        Visit type: Virtual visit with synchronous audio and video    Total time spent with patient: 17 minutes    Each patient to whom he or she provides medical services by telemedicine is:  (1) informed of the relationship between the physician and patient and the respective role of any other health care provider with respect to management of the patient; and (2) notified that he or she may decline to receive medical services by telemedicine and may withdraw from such care at any time.  Medication List with Changes/Refills   Current Medications    ATORVASTATIN (LIPITOR) 80 MG TABLET    Take 1 tablet (80 mg total) by mouth every evening.    BUPROPION (WELLBUTRIN XL) 150 MG TB24 TABLET    Take 2 tablets (300 mg total) by mouth once daily.    CALCIUM ORAL    Take by mouth Daily.    CHOLECALCIFEROL, VITAMIN D3, (VITAMIN D3) 2,000 UNIT CAP    Take 1 capsule (2,000 Units total) by mouth once daily.    CLOBETASOL 0.05% (TEMOVATE) 0.05 % OINT    AAA bid avoid face/groin    CYANOCOBALAMIN (VITAMIN B-12) 1000 MCG TABLET    Take 1,000 mcg by mouth once daily.    ESCITALOPRAM OXALATE (LEXAPRO) 20 MG TABLET    Take 1.5 tablets (30 mg total) by mouth once daily.    HYDROXYZINE HCL (ATARAX) 10 MG TAB    TAKE 1  TABLET (10 MG TOTAL) BY MOUTH 3 (THREE) TIMES DAILY AS NEEDED (ANXIETY).    IBUPROFEN (ADVIL,MOTRIN) 800 MG TABLET    Take 800 mg by mouth 3 (three) times daily.    PROGESTERONE (PROMETRIUM) 100 MG CAPSULE    TAKE ONE BY MOUTH NIGHTLY    VALACYCLOVIR (VALTREX) 1000 MG TABLET    TAKE (1/2) HALF BY MOUTH EVERY 12 HOURS PRN    VALSARTAN-HYDROCHLOROTHIAZIDE (DIOVAN-HCT) 320-25 MG PER TABLET    Take 1 tablet by mouth once daily.              [1]   Social History  Tobacco Use    Smoking status: Never    Smokeless tobacco: Never   Substance Use Topics    Alcohol use: Yes     Comment: occasional    Drug use: No

## 2025-03-22 ENCOUNTER — RESULTS FOLLOW-UP (OUTPATIENT)
Dept: PSYCHIATRY | Facility: CLINIC | Age: 65
End: 2025-03-22

## 2025-03-25 ENCOUNTER — PATIENT MESSAGE (OUTPATIENT)
Dept: PSYCHIATRY | Facility: CLINIC | Age: 65
End: 2025-03-25
Payer: MEDICARE

## 2025-03-27 ENCOUNTER — PATIENT MESSAGE (OUTPATIENT)
Dept: INTERNAL MEDICINE | Facility: CLINIC | Age: 65
End: 2025-03-27
Payer: MEDICARE

## 2025-03-27 DIAGNOSIS — E87.1 HYPONATREMIA: Primary | ICD-10-CM

## 2025-03-31 ENCOUNTER — TELEPHONE (OUTPATIENT)
Dept: INTERNAL MEDICINE | Facility: CLINIC | Age: 65
End: 2025-03-31
Payer: MEDICARE

## 2025-03-31 NOTE — TELEPHONE ENCOUNTER
Spoke with pt  to f/u on BP ranges.    Pt was last seen on 03/17/2025 with VIRGIE Conner.    03/24/25   -125/80  03/25/25   -  120/73  03/26/25   - 115/79  03/27/25   -134/82  03/28/25   - 120/73

## 2025-03-31 NOTE — TELEPHONE ENCOUNTER
Sent a portal message notifying the pt,that we will be back in contact with her in one month to recheck her blood pressure ranges due to her switching her medications.

## 2025-04-16 ENCOUNTER — TELEPHONE (OUTPATIENT)
Dept: INTERNAL MEDICINE | Facility: CLINIC | Age: 65
End: 2025-04-16
Payer: MEDICARE

## 2025-04-16 VITALS — SYSTOLIC BLOOD PRESSURE: 120 MMHG | DIASTOLIC BLOOD PRESSURE: 78 MMHG

## 2025-05-06 DIAGNOSIS — Z78.0 MENOPAUSE: ICD-10-CM

## 2025-05-06 RX ORDER — PROGESTERONE 100 MG/1
100 CAPSULE ORAL NIGHTLY
Qty: 12 CAPSULE | Refills: 11 | OUTPATIENT
Start: 2025-05-06 | End: 2026-05-06

## 2025-05-19 DIAGNOSIS — B00.1 COLD SORE: ICD-10-CM

## 2025-05-19 RX ORDER — VALACYCLOVIR HYDROCHLORIDE 1 G/1
TABLET, FILM COATED ORAL
Qty: 30 TABLET | Refills: 2 | Status: SHIPPED | OUTPATIENT
Start: 2025-05-19

## 2025-05-19 NOTE — TELEPHONE ENCOUNTER
Refill Routing Note   Medication(s) are not appropriate for processing by [facility] for the following reason(s):        Patient not seen by provider within 15 months    [facility] action(s):  Defer             Appointments  past 12m or future 3m with PCP    Date Provider   Last Visit   2/1/2023 Amelia Diana MD   Next Visit   Visit date not found Amelia Diana MD   ED visits in past 90 days: 0        Note composed:1:40 PM 05/19/2025

## 2025-06-18 ENCOUNTER — TELEPHONE (OUTPATIENT)
Dept: ENDOSCOPY | Facility: HOSPITAL | Age: 65
End: 2025-06-18
Payer: MEDICARE

## 2025-06-26 ENCOUNTER — CLINICAL SUPPORT (OUTPATIENT)
Dept: ENDOSCOPY | Facility: HOSPITAL | Age: 65
End: 2025-06-26
Attending: STUDENT IN AN ORGANIZED HEALTH CARE EDUCATION/TRAINING PROGRAM
Payer: MEDICARE

## 2025-06-26 ENCOUNTER — TELEPHONE (OUTPATIENT)
Dept: ENDOSCOPY | Facility: HOSPITAL | Age: 65
End: 2025-06-26

## 2025-06-26 DIAGNOSIS — Z12.11 SCREENING FOR COLORECTAL CANCER: ICD-10-CM

## 2025-06-26 DIAGNOSIS — Z12.12 SCREENING FOR COLORECTAL CANCER: ICD-10-CM

## 2025-06-26 NOTE — PLAN OF CARE
Contacted the patient to schedule an endoscopy procedure(s). The patient did not answer the call, voice message left requesting a call back to [phone]. Follow up PAT appointment scheduled on [facility] in one week.

## 2025-06-26 NOTE — TELEPHONE ENCOUNTER
Contacted the patient to schedule an endoscopy procedure(s). The patient did not answer the call, voice message left requesting a call back to [phone]. Follow up PAT appointment scheduled on [facility] in one week.

## 2025-07-10 ENCOUNTER — TELEPHONE (OUTPATIENT)
Dept: INTERNAL MEDICINE | Facility: CLINIC | Age: 65
End: 2025-07-10
Payer: MEDICARE

## 2025-07-10 NOTE — TELEPHONE ENCOUNTER
Copied from CRM #[id number]. Topic: General Inquiry - Patient Advice  >> Jul 10, 2025 10:42 AM Summer wrote:  Type: Patient Call Back    Who called:pt    What is the request in detail:pt is requesting a call back in regards to clearance, pt wants to know if dr can fill out form for her eye surgery please assist.     Can the clinic reply by [facility]?no    Would the patient rather a call back or a response via [facility]? call    Best call back number:[phone] (    Additional Information:

## 2025-07-14 NOTE — PROGRESS NOTES
Subjective:       Patient ID: Odilia Winslow is a 65 y.o. female with history of CLAY, HTN, HLD, carotid & aortic atherosclerosis, fibroids, MRSA, vit D deficiency, hx parathyroid surgery, GERD, IBS, osteopenia.    Chief Complaint: Preop examination [Z01.818]    Patient is known to me, PCP is Dr. Annamarie Edwards. Here today for the following:    History of Present Illness    CHIEF COMPLAINT:  Patient presents today for preoperative evaluation for cataract surgery.    HTN:  Taking valsartan 320mg and nifedipine 30 mg.   Home /70's  Denies CP, SOB, dizziness, peripheral edema.   Previously had urinary frequency and hyponatremia with HCTZ    Procedure: Cataract surgery  Surgeon: Dr. Nam  Date: 7/23/25 & 8/6/25    Indications: Cataracts    DASI scale: 58.2 points  METs: 9.89    Alcohol use: 4x/week with 4 drinks/sitting  Tobacco use: No  Chronic narcotic use: No    Anti-coagulant therapy: No  Anti-platelet therapy: No    Pregnant: No  JOSE A: STOP-BANG score 3 points, high risk for JOSE A.   Prior anesthesia: Yes, no complications.    Follow up labs/imaging/EKG: CMP  Clearance from another provider: N/A    Discussed with patient that they have a Revised Cardiac Risk Index for Pre-Operative Risk of 0.5% 30-day risk of death, MI, or cardiac arrest. Patient verbalized understanding.     ROS:  Cardiovascular: -chest pain, -palpitations, -lower extremity edema  Respiratory: -cough, -shortness of breath, +snoring  Musculoskeletal: -joint pain, -muscle pain  Skin: -rash, -lesion  Neurological: -headache, -dizziness, -numbness, -tingling  Psychiatric: -anxiety, -depression, -sleep difficulty         Current Outpatient Medications   Medication Instructions    atorvastatin (LIPITOR) 80 mg, Oral, Nightly    CALCIUM ORAL Daily    cholecalciferol (vitamin D3) (VITAMIN D3) 2,000 Units, Oral, Daily    clobetasol 0.05% (TEMOVATE) 0.05 % Oint AAA bid avoid face/groin    cyanocobalamin (VITAMIN B-12) 1,000 mcg, Daily     "EScitalopram oxalate (LEXAPRO) 30 mg, Oral, Daily    ibuprofen (ADVIL,MOTRIN) 800 mg, 3 times daily    NIFEdipine (PROCARDIA-XL) 30 mg, Oral, Daily    valACYclovir (VALTREX) 1000 MG tablet TAKE (1/2) HALF BY MOUTH EVERY 12 HOURS AS NEEDED    valsartan (DIOVAN) 320 mg, Oral, Daily     Objective:      Vitals:    07/15/25 1337   BP: 132/68   Pulse: 88   SpO2: 98%   Weight: 76.1 kg (167 lb 12.3 oz)   Height: 5' 7" (1.702 m)   PainSc: 0-No pain     Body mass index is 26.28 kg/m².    Physical Exam  Constitutional:       General: She is not in acute distress.     Appearance: Normal appearance. She is well-developed. She is not diaphoretic.   HENT:      Head: Normocephalic and atraumatic.   Eyes:      General: No scleral icterus.        Right eye: No discharge.         Left eye: No discharge.   Neck:      Thyroid: No thyromegaly.      Trachea: No tracheal deviation.   Cardiovascular:      Rate and Rhythm: Normal rate and regular rhythm.      Pulses: Normal pulses.      Heart sounds: Normal heart sounds. No murmur heard.     No friction rub. No gallop.      Comments: Radial pulses 2+ bilaterally.   Pulmonary:      Effort: Pulmonary effort is normal. No respiratory distress.      Breath sounds: Normal breath sounds. No stridor. No wheezing, rhonchi or rales.   Musculoskeletal:         General: No deformity.      Cervical back: Neck supple.      Right lower leg: No edema.      Left lower leg: No edema.   Lymphadenopathy:      Cervical: No cervical adenopathy.   Skin:     General: Skin is warm and dry.      Findings: No erythema.   Neurological:      Mental Status: She is alert and oriented to person, place, and time. Mental status is at baseline.      Gait: Gait normal.   Psychiatric:         Mood and Affect: Mood normal.         Behavior: Behavior normal.         Thought Content: Thought content normal.         Judgment: Judgment normal.         Assessment:       1. Preop examination    2. HTN (hypertension), benign    3. " Hyponatremia        IMPRESSION:  - Assessed for preoperative clearance for low-risk cataract surgery.  - Low risk for significant cardiac-related events during surgery based on functional capacity and absence of major cardiovascular risk factors.  - History of carotid aortic atherosclerosis, managed with atorvastatin 80 mg.  - High risk for sleep apnea based on screening questions. Not currently treated. Has had prior anesthesia with no complications per patient report.   - BP control improved with current regimen of Valsartan and Nifedipine.  - Discontinued Wellbutrin and hydroxyzine due to sleep issues.  - Repeat sodium lab test due to previous low level while on HCTZ, which has since been discontinued.  - Considered potential need for further workup (e.g., urine studies) if sodium remains low, but does not anticipate impact on surgery clearance.    Plan:     PLAN SUMMARY:  - Discontinued Wellbutrin and hydroxyzine due to sleep issues  - Ordered labs to check sodium levels  - Continued atorvastatin 80 mg daily for management  - Continued Valsartan and Nifedipine  - Patient to provide or have pre-op form faxed to the office  - Patient cleared for cataract surgery given current status. If new symptoms develop they should be reevaluated.     Preop examination  - Evaluated patient's medical history for low risk cataract surgery.  - Good functional capacity and physical exam with no indications for preoperative testing, EKG, imaging.   - If new symptoms develop patient should return to clinic.     HTN (hypertension), benign  BP seems well controlled. Continue current regimen.    Hyponatremia  - Discussed potential symptoms of low sodium levels, including confusion, fatigue, and headaches.  - Ordered labs to check sodium levels.      This note was generated with the assistance of ambient listening technology. Verbal consent was obtained by the patient and accompanying visitor(s) for the recording of patient appointment  to facilitate this note. I attest to having reviewed and edited the generated note for accuracy, though some syntax or spelling errors may persist. Please contact the author of this note for any clarification.    Ramiro Conner PA-C    7/15/2025

## 2025-07-15 ENCOUNTER — RESULTS FOLLOW-UP (OUTPATIENT)
Dept: INTERNAL MEDICINE | Facility: CLINIC | Age: 65
End: 2025-07-15

## 2025-07-15 ENCOUNTER — LAB VISIT (OUTPATIENT)
Dept: LAB | Facility: OTHER | Age: 65
End: 2025-07-15
Attending: COUNSELOR
Payer: MEDICARE

## 2025-07-15 ENCOUNTER — OFFICE VISIT (OUTPATIENT)
Dept: INTERNAL MEDICINE | Facility: CLINIC | Age: 65
End: 2025-07-15
Payer: MEDICARE

## 2025-07-15 VITALS
DIASTOLIC BLOOD PRESSURE: 68 MMHG | HEIGHT: 67 IN | HEART RATE: 88 BPM | OXYGEN SATURATION: 98 % | WEIGHT: 167.75 LBS | BODY MASS INDEX: 26.33 KG/M2 | SYSTOLIC BLOOD PRESSURE: 132 MMHG

## 2025-07-15 DIAGNOSIS — E87.1 HYPONATREMIA: ICD-10-CM

## 2025-07-15 DIAGNOSIS — I10 HTN (HYPERTENSION), BENIGN: ICD-10-CM

## 2025-07-15 DIAGNOSIS — Z01.818 PREOP EXAMINATION: Primary | ICD-10-CM

## 2025-07-15 LAB
ANION GAP (OHS): 11 MMOL/L (ref 8–16)
BUN SERPL-MCNC: 10 MG/DL (ref 8–23)
CALCIUM SERPL-MCNC: 9.6 MG/DL (ref 8.7–10.5)
CHLORIDE SERPL-SCNC: 100 MMOL/L (ref 95–110)
CO2 SERPL-SCNC: 24 MMOL/L (ref 23–29)
CREAT SERPL-MCNC: 0.8 MG/DL (ref 0.5–1.4)
GFR SERPLBLD CREATININE-BSD FMLA CKD-EPI: >60 ML/MIN/1.73/M2
GLUCOSE SERPL-MCNC: 100 MG/DL (ref 70–110)
POTASSIUM SERPL-SCNC: 4.5 MMOL/L (ref 3.5–5.1)
SODIUM SERPL-SCNC: 135 MMOL/L (ref 136–145)

## 2025-07-15 PROCEDURE — 36415 COLL VENOUS BLD VENIPUNCTURE: CPT | Mod: HCNC

## 2025-07-15 PROCEDURE — 99999 PR PBB SHADOW E&M-EST. PATIENT-LVL III: CPT | Mod: PBBFAC,HCNC,, | Performed by: COUNSELOR

## 2025-07-15 PROCEDURE — 82435 ASSAY OF BLOOD CHLORIDE: CPT | Mod: HCNC

## 2025-07-21 ENCOUNTER — TELEPHONE (OUTPATIENT)
Dept: INTERNAL MEDICINE | Facility: CLINIC | Age: 65
End: 2025-07-21
Payer: MEDICARE

## 2025-07-21 NOTE — TELEPHONE ENCOUNTER
Staff printed latest progress note for pt's pre-op form so it can be faxed over to the operating facility to be cleared for surgery.

## 2025-08-18 ENCOUNTER — TELEPHONE (OUTPATIENT)
Dept: PHARMACY | Facility: CLINIC | Age: 65
End: 2025-08-18
Payer: MEDICARE

## (undated) DEVICE — SUT CHROMIC 2-0 SH 27IN BRN

## (undated) DEVICE — SYR 10CC LUER LOCK

## (undated) DEVICE — GLOVE BIOGEL SKINSENSE PI 6.5

## (undated) DEVICE — DRAPE THYROID WITH ARMBOARD

## (undated) DEVICE — SOL 9P NACL IRR PIC IL

## (undated) DEVICE — SET BASIN 48X48IN 6000ML RING

## (undated) DEVICE — DRESSING TEGADERM IV 3.5 X 4.5

## (undated) DEVICE — SEE MEDLINE ITEM 157194

## (undated) DEVICE — CONTAINER SPECIMEN STRL 4OZ

## (undated) DEVICE — SUT VICRYL 3-0 27 SH

## (undated) DEVICE — NDL HYPO A BEVEL 22X1 1/2

## (undated) DEVICE — APPLIER LIGACLIP SM 9.38IN

## (undated) DEVICE — SUT MCRYL PLUS 4-0 PS2 27IN

## (undated) DEVICE — SUT 3-0 12-18IN SILK

## (undated) DEVICE — CHLORAPREP 10.5 ML APPLICATOR

## (undated) DEVICE — CLOSURE SKIN STERI STRIP 1/2X4

## (undated) DEVICE — HEMOSTAT SURGICEL 4X8IN

## (undated) DEVICE — SEE MEDLINE ITEM 146313

## (undated) DEVICE — SEE MEDLINE ITEM 157117

## (undated) DEVICE — SOL PVP-I SCRUB 7.5% 4OZ

## (undated) DEVICE — SOL BETADINE 5%

## (undated) DEVICE — SUT 4-0 12-18IN SILK BLACK

## (undated) DEVICE — Device

## (undated) DEVICE — SPONGE GAUZE 16PLY 4X4

## (undated) DEVICE — DRESSING TELFA PAD N ADH 2X3

## (undated) DEVICE — TRAY MINOR GEN SURG

## (undated) DEVICE — ELECTRODE REM PLYHSV RETURN 9

## (undated) DEVICE — TRAY DRY SKIN SCRUB PREP

## (undated) DEVICE — SUT 2-0 12-18IN SILK

## (undated) DEVICE — NDL N SERIES MICRO-DISSECTION

## (undated) DEVICE — ELECTRODE BLADE INSULATED 1 IN

## (undated) DEVICE — SOL IRR NACL .9% 3000ML